# Patient Record
Sex: FEMALE | Race: WHITE | NOT HISPANIC OR LATINO
[De-identification: names, ages, dates, MRNs, and addresses within clinical notes are randomized per-mention and may not be internally consistent; named-entity substitution may affect disease eponyms.]

---

## 2017-01-05 ENCOUNTER — APPOINTMENT (OUTPATIENT)
Dept: CARDIOLOGY | Facility: CLINIC | Age: 72
End: 2017-01-05

## 2017-01-05 ENCOUNTER — NON-APPOINTMENT (OUTPATIENT)
Age: 72
End: 2017-01-05

## 2017-01-05 VITALS
BODY MASS INDEX: 30.12 KG/M2 | HEART RATE: 76 BPM | OXYGEN SATURATION: 97 % | DIASTOLIC BLOOD PRESSURE: 85 MMHG | HEIGHT: 63 IN | SYSTOLIC BLOOD PRESSURE: 153 MMHG | WEIGHT: 170 LBS

## 2017-01-05 VITALS — SYSTOLIC BLOOD PRESSURE: 142 MMHG | DIASTOLIC BLOOD PRESSURE: 80 MMHG

## 2017-01-11 LAB
ALBUMIN SERPL ELPH-MCNC: 4.5 G/DL
ALP BLD-CCNC: 75 U/L
ALT SERPL-CCNC: 27 U/L
AST SERPL-CCNC: 27 U/L
BILIRUB DIRECT SERPL-MCNC: 0.2 MG/DL
BILIRUB INDIRECT SERPL-MCNC: 0.8 MG/DL
BILIRUB SERPL-MCNC: 1 MG/DL
CHOLEST SERPL-MCNC: 187 MG/DL
CHOLEST/HDLC SERPL: 1.9 RATIO
HDLC SERPL-MCNC: 97 MG/DL
LDLC SERPL CALC-MCNC: 70 MG/DL
PROT SERPL-MCNC: 7.3 G/DL
TRIGL SERPL-MCNC: 99 MG/DL

## 2017-01-18 ENCOUNTER — MESSAGE (OUTPATIENT)
Age: 72
End: 2017-01-18

## 2017-01-20 ENCOUNTER — TRANSCRIPTION ENCOUNTER (OUTPATIENT)
Age: 72
End: 2017-01-20

## 2017-01-20 ENCOUNTER — APPOINTMENT (OUTPATIENT)
Dept: INTERNAL MEDICINE | Facility: CLINIC | Age: 72
End: 2017-01-20

## 2017-01-20 VITALS
SYSTOLIC BLOOD PRESSURE: 125 MMHG | BODY MASS INDEX: 30.12 KG/M2 | HEIGHT: 63 IN | TEMPERATURE: 98.5 F | WEIGHT: 170 LBS | DIASTOLIC BLOOD PRESSURE: 80 MMHG | HEART RATE: 70 BPM

## 2017-01-20 DIAGNOSIS — Z23 ENCOUNTER FOR IMMUNIZATION: ICD-10-CM

## 2017-02-09 ENCOUNTER — APPOINTMENT (OUTPATIENT)
Dept: VASCULAR SURGERY | Facility: CLINIC | Age: 72
End: 2017-02-09

## 2017-02-16 ENCOUNTER — APPOINTMENT (OUTPATIENT)
Dept: VASCULAR SURGERY | Facility: CLINIC | Age: 72
End: 2017-02-16

## 2017-02-16 VITALS
WEIGHT: 175 LBS | HEIGHT: 63 IN | RESPIRATION RATE: 16 BRPM | OXYGEN SATURATION: 98 % | BODY MASS INDEX: 31.01 KG/M2 | SYSTOLIC BLOOD PRESSURE: 126 MMHG | TEMPERATURE: 98.2 F | HEART RATE: 82 BPM | DIASTOLIC BLOOD PRESSURE: 76 MMHG

## 2017-03-29 ENCOUNTER — APPOINTMENT (OUTPATIENT)
Dept: INTERNAL MEDICINE | Facility: CLINIC | Age: 72
End: 2017-03-29

## 2017-03-29 VITALS
WEIGHT: 176 LBS | BODY MASS INDEX: 31.18 KG/M2 | SYSTOLIC BLOOD PRESSURE: 120 MMHG | HEART RATE: 79 BPM | HEIGHT: 63 IN | DIASTOLIC BLOOD PRESSURE: 80 MMHG | TEMPERATURE: 98.2 F

## 2017-03-29 DIAGNOSIS — J06.9 ACUTE UPPER RESPIRATORY INFECTION, UNSPECIFIED: ICD-10-CM

## 2017-03-29 RX ORDER — CEFDINIR 300 MG/1
300 CAPSULE ORAL TWICE DAILY
Qty: 20 | Refills: 0 | Status: DISCONTINUED | COMMUNITY
Start: 2017-01-20 | End: 2017-03-29

## 2017-06-30 ENCOUNTER — RX RENEWAL (OUTPATIENT)
Age: 72
End: 2017-06-30

## 2017-07-05 ENCOUNTER — RX RENEWAL (OUTPATIENT)
Age: 72
End: 2017-07-05

## 2017-07-13 ENCOUNTER — APPOINTMENT (OUTPATIENT)
Dept: CARDIOLOGY | Facility: CLINIC | Age: 72
End: 2017-07-13

## 2017-07-13 ENCOUNTER — NON-APPOINTMENT (OUTPATIENT)
Age: 72
End: 2017-07-13

## 2017-07-13 VITALS
WEIGHT: 173 LBS | OXYGEN SATURATION: 98 % | BODY MASS INDEX: 30.65 KG/M2 | HEART RATE: 74 BPM | DIASTOLIC BLOOD PRESSURE: 82 MMHG | SYSTOLIC BLOOD PRESSURE: 126 MMHG

## 2017-07-13 RX ORDER — AMOXICILLIN AND CLAVULANATE POTASSIUM 875; 125 MG/1; MG/1
875-125 TABLET, COATED ORAL
Qty: 20 | Refills: 0 | Status: DISCONTINUED | COMMUNITY
Start: 2017-03-29 | End: 2017-07-13

## 2017-07-14 ENCOUNTER — APPOINTMENT (OUTPATIENT)
Dept: INTERNAL MEDICINE | Facility: CLINIC | Age: 72
End: 2017-07-14

## 2017-07-14 VITALS
WEIGHT: 171 LBS | HEART RATE: 72 BPM | BODY MASS INDEX: 30.3 KG/M2 | HEIGHT: 63 IN | RESPIRATION RATE: 16 BRPM | DIASTOLIC BLOOD PRESSURE: 70 MMHG | SYSTOLIC BLOOD PRESSURE: 116 MMHG

## 2017-07-14 DIAGNOSIS — S93.412A SPRAIN OF CALCANEOFIBULAR LIGAMENT OF LEFT ANKLE, INITIAL ENCOUNTER: ICD-10-CM

## 2017-07-14 DIAGNOSIS — K59.09 OTHER CONSTIPATION: ICD-10-CM

## 2017-07-14 DIAGNOSIS — Z96.651 PRESENCE OF RIGHT ARTIFICIAL KNEE JOINT: ICD-10-CM

## 2017-07-15 LAB
ALBUMIN SERPL ELPH-MCNC: 4.4 G/DL
ALP BLD-CCNC: 67 U/L
ALT SERPL-CCNC: 21 U/L
ANION GAP SERPL CALC-SCNC: 17 MMOL/L
APPEARANCE: CLEAR
AST SERPL-CCNC: 23 U/L
BACTERIA: NEGATIVE
BASOPHILS # BLD AUTO: 0.04 K/UL
BASOPHILS NFR BLD AUTO: 0.8 %
BILIRUB SERPL-MCNC: 0.5 MG/DL
BILIRUBIN URINE: NEGATIVE
BLOOD URINE: ABNORMAL
BUN SERPL-MCNC: 15 MG/DL
CALCIUM SERPL-MCNC: 9 MG/DL
CHLORIDE SERPL-SCNC: 105 MMOL/L
CHOLEST SERPL-MCNC: 194 MG/DL
CHOLEST/HDLC SERPL: 2.3 RATIO
CO2 SERPL-SCNC: 20 MMOL/L
COLOR: YELLOW
CREAT SERPL-MCNC: 0.7 MG/DL
EOSINOPHIL # BLD AUTO: 0.21 K/UL
EOSINOPHIL NFR BLD AUTO: 3.9 %
GLUCOSE QUALITATIVE U: NORMAL MG/DL
GLUCOSE SERPL-MCNC: 103 MG/DL
HCT VFR BLD CALC: 40.7 %
HDLC SERPL-MCNC: 84 MG/DL
HGB BLD-MCNC: 13.1 G/DL
HYALINE CASTS: 5 /LPF
IMM GRANULOCYTES NFR BLD AUTO: 0.2 %
KETONES URINE: NEGATIVE
LDLC SERPL CALC-MCNC: 95 MG/DL
LEUKOCYTE ESTERASE URINE: ABNORMAL
LYMPHOCYTES # BLD AUTO: 1.56 K/UL
LYMPHOCYTES NFR BLD AUTO: 29.3 %
MAN DIFF?: NORMAL
MCHC RBC-ENTMCNC: 30.5 PG
MCHC RBC-ENTMCNC: 32.2 GM/DL
MCV RBC AUTO: 94.9 FL
MICROSCOPIC-UA: NORMAL
MONOCYTES # BLD AUTO: 0.8 K/UL
MONOCYTES NFR BLD AUTO: 15 %
NEUTROPHILS # BLD AUTO: 2.7 K/UL
NEUTROPHILS NFR BLD AUTO: 50.8 %
NITRITE URINE: NEGATIVE
PH URINE: 5
PLATELET # BLD AUTO: 350 K/UL
POTASSIUM SERPL-SCNC: 4.6 MMOL/L
PROT SERPL-MCNC: 7.1 G/DL
PROTEIN URINE: NEGATIVE MG/DL
RBC # BLD: 4.29 M/UL
RBC # FLD: 18.1 %
RED BLOOD CELLS URINE: 1 /HPF
SODIUM SERPL-SCNC: 142 MMOL/L
SPECIFIC GRAVITY URINE: 1.02
SQUAMOUS EPITHELIAL CELLS: 5 /HPF
TRIGL SERPL-MCNC: 73 MG/DL
UROBILINOGEN URINE: NORMAL MG/DL
WBC # FLD AUTO: 5.32 K/UL
WHITE BLOOD CELLS URINE: 11 /HPF

## 2017-09-21 ENCOUNTER — RX RENEWAL (OUTPATIENT)
Age: 72
End: 2017-09-21

## 2017-12-02 ENCOUNTER — INPATIENT (INPATIENT)
Facility: HOSPITAL | Age: 72
LOS: 1 days | Discharge: HOME | DRG: 432 | End: 2017-12-04
Attending: HOSPITALIST | Admitting: HOSPITALIST
Payer: COMMERCIAL

## 2017-12-02 VITALS
OXYGEN SATURATION: 99 % | WEIGHT: 175.05 LBS | HEART RATE: 83 BPM | RESPIRATION RATE: 18 BRPM | SYSTOLIC BLOOD PRESSURE: 144 MMHG | HEIGHT: 62 IN | TEMPERATURE: 98 F | DIASTOLIC BLOOD PRESSURE: 80 MMHG

## 2017-12-02 DIAGNOSIS — Z98.89 OTHER SPECIFIED POSTPROCEDURAL STATES: Chronic | ICD-10-CM

## 2017-12-02 LAB
ALBUMIN SERPL ELPH-MCNC: 3.8 G/DL — SIGNIFICANT CHANGE UP (ref 3.3–5.2)
ALP SERPL-CCNC: 241 U/L — HIGH (ref 40–120)
ALT FLD-CCNC: 770 U/L — HIGH
ANION GAP SERPL CALC-SCNC: 14 MMOL/L — SIGNIFICANT CHANGE UP (ref 5–17)
ANISOCYTOSIS BLD QL: SLIGHT — SIGNIFICANT CHANGE UP
APPEARANCE UR: CLEAR — SIGNIFICANT CHANGE UP
AST SERPL-CCNC: 773 U/L — HIGH
BASOPHILS # BLD AUTO: 0 K/UL — SIGNIFICANT CHANGE UP (ref 0–0.2)
BASOPHILS NFR BLD AUTO: 0.3 % — SIGNIFICANT CHANGE UP (ref 0–2)
BILIRUB SERPL-MCNC: 3.4 MG/DL — HIGH (ref 0.4–2)
BILIRUB UR-MCNC: NEGATIVE — SIGNIFICANT CHANGE UP
BUN SERPL-MCNC: 12 MG/DL — SIGNIFICANT CHANGE UP (ref 8–20)
CALCIUM SERPL-MCNC: 9.1 MG/DL — SIGNIFICANT CHANGE UP (ref 8.6–10.2)
CHLORIDE SERPL-SCNC: 102 MMOL/L — SIGNIFICANT CHANGE UP (ref 98–107)
CO2 SERPL-SCNC: 24 MMOL/L — SIGNIFICANT CHANGE UP (ref 22–29)
COLOR SPEC: YELLOW — SIGNIFICANT CHANGE UP
CREAT SERPL-MCNC: 0.45 MG/DL — LOW (ref 0.5–1.3)
DIFF PNL FLD: ABNORMAL
EOSINOPHIL # BLD AUTO: 0.2 K/UL — SIGNIFICANT CHANGE UP (ref 0–0.5)
EOSINOPHIL NFR BLD AUTO: 3.3 % — SIGNIFICANT CHANGE UP (ref 0–6)
EPI CELLS # UR: SIGNIFICANT CHANGE UP
GLUCOSE SERPL-MCNC: 105 MG/DL — SIGNIFICANT CHANGE UP (ref 70–115)
GLUCOSE UR QL: 100 MG/DL
HCT VFR BLD CALC: 39.8 % — SIGNIFICANT CHANGE UP (ref 37–47)
HGB BLD-MCNC: 13.4 G/DL — SIGNIFICANT CHANGE UP (ref 12–16)
INR BLD: 1.05 RATIO — SIGNIFICANT CHANGE UP (ref 0.88–1.16)
KETONES UR-MCNC: NEGATIVE — SIGNIFICANT CHANGE UP
LEUKOCYTE ESTERASE UR-ACNC: ABNORMAL
LIDOCAIN IGE QN: 50 U/L — SIGNIFICANT CHANGE UP (ref 22–51)
LYMPHOCYTES # BLD AUTO: 0.4 K/UL — LOW (ref 1–4.8)
LYMPHOCYTES # BLD AUTO: 5.7 % — LOW (ref 20–55)
MACROCYTES BLD QL: SLIGHT — SIGNIFICANT CHANGE UP
MCHC RBC-ENTMCNC: 33 PG — HIGH (ref 27–31)
MCHC RBC-ENTMCNC: 33.7 G/DL — SIGNIFICANT CHANGE UP (ref 32–36)
MCV RBC AUTO: 98 FL — SIGNIFICANT CHANGE UP (ref 81–99)
MONOCYTES # BLD AUTO: 0.7 K/UL — SIGNIFICANT CHANGE UP (ref 0–0.8)
MONOCYTES NFR BLD AUTO: 9.5 % — SIGNIFICANT CHANGE UP (ref 3–10)
NEUTROPHILS # BLD AUTO: 6 K/UL — SIGNIFICANT CHANGE UP (ref 1.8–8)
NEUTROPHILS NFR BLD AUTO: 80.9 % — HIGH (ref 37–73)
NITRITE UR-MCNC: NEGATIVE — SIGNIFICANT CHANGE UP
NT-PROBNP SERPL-SCNC: 266 PG/ML — SIGNIFICANT CHANGE UP (ref 0–300)
PH UR: 7 — SIGNIFICANT CHANGE UP (ref 5–8)
PLAT MORPH BLD: NORMAL — SIGNIFICANT CHANGE UP
PLATELET # BLD AUTO: 264 K/UL — SIGNIFICANT CHANGE UP (ref 150–400)
POTASSIUM SERPL-MCNC: 3.9 MMOL/L — SIGNIFICANT CHANGE UP (ref 3.5–5.3)
POTASSIUM SERPL-SCNC: 3.9 MMOL/L — SIGNIFICANT CHANGE UP (ref 3.5–5.3)
PROT SERPL-MCNC: 7 G/DL — SIGNIFICANT CHANGE UP (ref 6.6–8.7)
PROT UR-MCNC: 30 MG/DL
PROTHROM AB SERPL-ACNC: 11.6 SEC — SIGNIFICANT CHANGE UP (ref 9.8–12.7)
RBC # BLD: 4.06 M/UL — LOW (ref 4.4–5.2)
RBC # FLD: 15.3 % — SIGNIFICANT CHANGE UP (ref 11–15.6)
RBC BLD AUTO: ABNORMAL
SODIUM SERPL-SCNC: 140 MMOL/L — SIGNIFICANT CHANGE UP (ref 135–145)
SP GR SPEC: 1 — LOW (ref 1.01–1.02)
TROPONIN T SERPL-MCNC: <0.01 NG/ML — SIGNIFICANT CHANGE UP (ref 0–0.06)
UROBILINOGEN FLD QL: NEGATIVE MG/DL — SIGNIFICANT CHANGE UP
WBC # BLD: 7.4 K/UL — SIGNIFICANT CHANGE UP (ref 4.8–10.8)
WBC # FLD AUTO: 7.4 K/UL — SIGNIFICANT CHANGE UP (ref 4.8–10.8)
WBC UR QL: SIGNIFICANT CHANGE UP

## 2017-12-02 PROCEDURE — 74177 CT ABD & PELVIS W/CONTRAST: CPT | Mod: 26

## 2017-12-02 PROCEDURE — 76705 ECHO EXAM OF ABDOMEN: CPT | Mod: 26

## 2017-12-02 PROCEDURE — 99285 EMERGENCY DEPT VISIT HI MDM: CPT

## 2017-12-02 PROCEDURE — 71010: CPT | Mod: 26

## 2017-12-02 PROCEDURE — 93010 ELECTROCARDIOGRAM REPORT: CPT

## 2017-12-02 RX ORDER — MORPHINE SULFATE 50 MG/1
4 CAPSULE, EXTENDED RELEASE ORAL ONCE
Qty: 0 | Refills: 0 | Status: DISCONTINUED | OUTPATIENT
Start: 2017-12-02 | End: 2017-12-02

## 2017-12-02 RX ORDER — SODIUM CHLORIDE 9 MG/ML
3 INJECTION INTRAMUSCULAR; INTRAVENOUS; SUBCUTANEOUS ONCE
Qty: 0 | Refills: 0 | Status: COMPLETED | OUTPATIENT
Start: 2017-12-02 | End: 2017-12-02

## 2017-12-02 RX ADMIN — MORPHINE SULFATE 4 MILLIGRAM(S): 50 CAPSULE, EXTENDED RELEASE ORAL at 17:37

## 2017-12-02 RX ADMIN — MORPHINE SULFATE 4 MILLIGRAM(S): 50 CAPSULE, EXTENDED RELEASE ORAL at 17:50

## 2017-12-02 RX ADMIN — SODIUM CHLORIDE 3 MILLILITER(S): 9 INJECTION INTRAMUSCULAR; INTRAVENOUS; SUBCUTANEOUS at 15:19

## 2017-12-02 NOTE — ED PROVIDER NOTE - CARE PLAN
Principal Discharge DX:	Chest pain  Secondary Diagnosis:	Aches Principal Discharge DX:	Acute pancreatitis, unspecified complication status, unspecified pancreatitis type  Secondary Diagnosis:	Aches  Secondary Diagnosis:	Transaminitis

## 2017-12-02 NOTE — ED ADULT NURSE REASSESSMENT NOTE - NS ED NURSE REASSESS COMMENT FT1
report rec'd at this time patient presently at [facility] and waiting on her return

## 2017-12-02 NOTE — ED ADULT NURSE REASSESSMENT NOTE - NS ED NURSE REASSESS COMMENT FT1
patient returned from sono patient resting omforably  v/s taken and chart awating on results of sono

## 2017-12-02 NOTE — ED PROVIDER NOTE - PMH
Atrial fibrillation    ADARSH (obstructive sleep apnea)  does not use CPAP  Osteoarthritis  ,A- fib in Jan 2014 and converted back to normal sinus by self and had a cardiac ablation done in april 2014.

## 2017-12-02 NOTE — ED PROVIDER NOTE - OBJECTIVE STATEMENT
73 y/o female with a h/o afib s/p ablation and osteoporosis and she drinks a bottle of wine every night her daughter says and she was well until about 2 weeks ago she developed chest pain and then over the past 24 hours she has felt total body pain except her legs

## 2017-12-02 NOTE — ED PROVIDER NOTE - PSH
S/P ablation of atrial fibrillation    S/P right knee arthroscopy  (2014),right  foot surgery (1999), B/L cataract surgey (2008),

## 2017-12-03 DIAGNOSIS — F32.9 MAJOR DEPRESSIVE DISORDER, SINGLE EPISODE, UNSPECIFIED: ICD-10-CM

## 2017-12-03 DIAGNOSIS — K85.90 ACUTE PANCREATITIS WITHOUT NECROSIS OR INFECTION, UNSPECIFIED: ICD-10-CM

## 2017-12-03 DIAGNOSIS — R10.84 GENERALIZED ABDOMINAL PAIN: ICD-10-CM

## 2017-12-03 DIAGNOSIS — H26.40 UNSPECIFIED SECONDARY CATARACT: Chronic | ICD-10-CM

## 2017-12-03 DIAGNOSIS — Z29.9 ENCOUNTER FOR PROPHYLACTIC MEASURES, UNSPECIFIED: ICD-10-CM

## 2017-12-03 DIAGNOSIS — K75.9 INFLAMMATORY LIVER DISEASE, UNSPECIFIED: ICD-10-CM

## 2017-12-03 DIAGNOSIS — I48.91 UNSPECIFIED ATRIAL FIBRILLATION: ICD-10-CM

## 2017-12-03 DIAGNOSIS — R74.0 NONSPECIFIC ELEVATION OF LEVELS OF TRANSAMINASE AND LACTIC ACID DEHYDROGENASE [LDH]: ICD-10-CM

## 2017-12-03 LAB
AFP-TM SERPL-MCNC: 7.3 NG/ML — SIGNIFICANT CHANGE UP
ALBUMIN SERPL ELPH-MCNC: 3.6 G/DL — SIGNIFICANT CHANGE UP (ref 3.3–5.2)
ALP SERPL-CCNC: 249 U/L — HIGH (ref 40–120)
ALT FLD-CCNC: 706 U/L — HIGH
AMYLASE P1 CFR SERPL: 29 U/L — LOW (ref 36–128)
ANION GAP SERPL CALC-SCNC: 12 MMOL/L — SIGNIFICANT CHANGE UP (ref 5–17)
AST SERPL-CCNC: 474 U/L — HIGH
BILIRUB DIRECT SERPL-MCNC: 0.9 MG/DL — HIGH (ref 0–0.3)
BILIRUB INDIRECT FLD-MCNC: 1.3 MG/DL — HIGH (ref 0.2–1)
BILIRUB SERPL-MCNC: 2.2 MG/DL — HIGH (ref 0.4–2)
BILIRUB SERPL-MCNC: 2.2 MG/DL — HIGH (ref 0.4–2)
BUN SERPL-MCNC: 7 MG/DL — LOW (ref 8–20)
CALCIUM SERPL-MCNC: 8.9 MG/DL — SIGNIFICANT CHANGE UP (ref 8.6–10.2)
CHLORIDE SERPL-SCNC: 103 MMOL/L — SIGNIFICANT CHANGE UP (ref 98–107)
CHOLEST SERPL-MCNC: 153 MG/DL — SIGNIFICANT CHANGE UP (ref 110–199)
CO2 SERPL-SCNC: 25 MMOL/L — SIGNIFICANT CHANGE UP (ref 22–29)
CREAT SERPL-MCNC: 0.42 MG/DL — LOW (ref 0.5–1.3)
GGT SERPL-CCNC: 312 U/L — HIGH (ref 5–36)
GLUCOSE SERPL-MCNC: 121 MG/DL — HIGH (ref 70–115)
HAV IGG+IGM SER QL: SIGNIFICANT CHANGE UP
HAV IGM SER-ACNC: SIGNIFICANT CHANGE UP
HBV CORE IGM SER-ACNC: SIGNIFICANT CHANGE UP
HBV SURFACE AG SER-ACNC: SIGNIFICANT CHANGE UP
HCV AB S/CO SERPL IA: 0.06 S/CO — SIGNIFICANT CHANGE UP
HCV AB SERPL-IMP: SIGNIFICANT CHANGE UP
HDLC SERPL-MCNC: 105 MG/DL — SIGNIFICANT CHANGE UP
HETEROPH AB TITR SER AGGL: NEGATIVE — SIGNIFICANT CHANGE UP
LIDOCAIN IGE QN: 29 U/L — SIGNIFICANT CHANGE UP (ref 22–51)
LIPID PNL WITH DIRECT LDL SERPL: 39 MG/DL — SIGNIFICANT CHANGE UP
POTASSIUM SERPL-MCNC: 3.5 MMOL/L — SIGNIFICANT CHANGE UP (ref 3.5–5.3)
POTASSIUM SERPL-SCNC: 3.5 MMOL/L — SIGNIFICANT CHANGE UP (ref 3.5–5.3)
PROT SERPL-MCNC: 6.4 G/DL — LOW (ref 6.6–8.7)
SODIUM SERPL-SCNC: 140 MMOL/L — SIGNIFICANT CHANGE UP (ref 135–145)
TOTAL CHOLESTEROL/HDL RATIO MEASUREMENT: 1 RATIO — LOW (ref 3.3–7.1)
TRIGL SERPL-MCNC: 46 MG/DL — SIGNIFICANT CHANGE UP (ref 10–200)

## 2017-12-03 PROCEDURE — 12345: CPT | Mod: NC

## 2017-12-03 RX ORDER — SODIUM CHLORIDE 9 MG/ML
3 INJECTION INTRAMUSCULAR; INTRAVENOUS; SUBCUTANEOUS ONCE
Qty: 0 | Refills: 0 | Status: COMPLETED | OUTPATIENT
Start: 2017-12-03 | End: 2017-12-03

## 2017-12-03 RX ORDER — ZOLPIDEM TARTRATE 10 MG/1
1 TABLET ORAL
Qty: 0 | Refills: 0 | COMMUNITY

## 2017-12-03 RX ORDER — ASPIRIN/CALCIUM CARB/MAGNESIUM 324 MG
325 TABLET ORAL DAILY
Qty: 0 | Refills: 0 | Status: DISCONTINUED | OUTPATIENT
Start: 2017-12-03 | End: 2017-12-04

## 2017-12-03 RX ORDER — ONDANSETRON 8 MG/1
4 TABLET, FILM COATED ORAL EVERY 6 HOURS
Qty: 0 | Refills: 0 | Status: DISCONTINUED | OUTPATIENT
Start: 2017-12-03 | End: 2017-12-04

## 2017-12-03 RX ORDER — ENOXAPARIN SODIUM 100 MG/ML
40 INJECTION SUBCUTANEOUS EVERY 24 HOURS
Qty: 0 | Refills: 0 | Status: DISCONTINUED | OUTPATIENT
Start: 2017-12-03 | End: 2017-12-04

## 2017-12-03 RX ORDER — MULTIVIT-MIN/FERROUS GLUCONATE 9 MG/15 ML
1 LIQUID (ML) ORAL
Qty: 0 | Refills: 0 | COMMUNITY

## 2017-12-03 RX ORDER — SODIUM CHLORIDE 9 MG/ML
1000 INJECTION, SOLUTION INTRAVENOUS
Qty: 0 | Refills: 0 | Status: DISCONTINUED | OUTPATIENT
Start: 2017-12-03 | End: 2017-12-04

## 2017-12-03 RX ORDER — SERTRALINE 25 MG/1
50 TABLET, FILM COATED ORAL DAILY
Qty: 0 | Refills: 0 | Status: DISCONTINUED | OUTPATIENT
Start: 2017-12-03 | End: 2017-12-04

## 2017-12-03 RX ORDER — MORPHINE SULFATE 50 MG/1
2 CAPSULE, EXTENDED RELEASE ORAL EVERY 6 HOURS
Qty: 0 | Refills: 0 | Status: DISCONTINUED | OUTPATIENT
Start: 2017-12-03 | End: 2017-12-04

## 2017-12-03 RX ADMIN — Medication 325 MILLIGRAM(S): at 15:11

## 2017-12-03 RX ADMIN — SERTRALINE 50 MILLIGRAM(S): 25 TABLET, FILM COATED ORAL at 15:10

## 2017-12-03 RX ADMIN — SODIUM CHLORIDE 3 MILLILITER(S): 9 INJECTION INTRAMUSCULAR; INTRAVENOUS; SUBCUTANEOUS at 03:25

## 2017-12-03 RX ADMIN — SODIUM CHLORIDE 100 MILLILITER(S): 9 INJECTION, SOLUTION INTRAVENOUS at 03:23

## 2017-12-03 NOTE — H&P ADULT - NSHPLABSRESULTS_GEN_ALL_CORE
13.4   7.4   )-----------( 264      ( 02 Dec 2017 15:10 )             39.8   12-02    140  |  102  |  12.0  ----------------------------<  105  3.9   |  24.0  |  0.45<L>    Ca    9.1      02 Dec 2017 15:10    TPro  7.0  /  Alb  3.8  /  TBili  3.4<H>  /  DBili  x   /  AST  773<H>  /  ALT  770<H>  /  AlkPhos  241<H>  12-02    Lipase, Serum (12.02.17 @ 15:10)    Lipase, Serum: 50 U/L    US Abdomen Limited (12.02.17 @ 19:56) >  IMPRESSION:     No sonographic evidence of acute cholecystitis. No biliary ductal   dilatation.    CT Abdomen and Pelvis w/ Oral Cont and w/ IV Cont (12.02.17 @ 21:42) >  IMPRESSION:    Question mild stranding of the peripancreatic fat about the pancreatic   head which raises question of pancreatitis. Correlate with amylase/lipase.    Pancolonic diverticulosis without evidence of diverticulitis.

## 2017-12-03 NOTE — H&P ADULT - NSHPPHYSICALEXAM_GEN_ALL_CORE
ICU Vital Signs Last 24 Hrs  T(C): 37.4 (02 Dec 2017 20:00), Max: 37.4 (02 Dec 2017 20:00)  T(F): 99.4 (02 Dec 2017 20:00), Max: 99.4 (02 Dec 2017 20:00)  HR: 74 (02 Dec 2017 20:00) (74 - 83)  BP: 128/70 (02 Dec 2017 20:00) (128/70 - 146/82)  RR: 18 (02 Dec 2017 20:00) (17 - 18)  SpO2: 97% (02 Dec 2017 20:00) (97% - 99%)    GENERAL: NAD, well-groomed, well-developed  HEAD:  Atraumatic, Normocephalic  EYES: EOMI, PERRLA, conjunctiva and sclera clear, lens present bl due to cataract surgery   ENMT: No tonsillar erythema, exudates, or enlargement; Moist mucous membranes, Good dentition, No lesions  NECK: Supple, No JVD, Normal thyroid, no carotid bruit   LUNG: Clear to auscultation bilaterally; No rales, rhonchi, wheezing, or rubs  HEART: Regular rate and rhythm; No murmurs, rubs, or gallops  ABDOMEN: Soft, slight tender to deep palpation over the hypogastric region, no guarding, no rebound tenderness , Nondistended; Bowel sounds present  EXTREMITIES:  2+ Peripheral Pulses, No clubbing, cyanosis, or edema  LYMPH: No lymphadenopathy noted supraclavicular or cervical   NERVOUS SYSTEM:  Alert & Oriented X3, Good concentration; Motor Strength 5/5 B/L upper and lower extremities; CN II- XII grossly intact   SKIN: No rashes or lesions

## 2017-12-03 NOTE — CHART NOTE - NSCHARTNOTEFT_GEN_A_CORE
Post midnight admission brief note. Patient was seen and examined by overnight hospitalist this am. HPI reviewed. Labs, vitals noted. I have personally seen and examined this patient today     VS noted   Labs reviewed   Physical Exam:   Gen: Elderly female in nad  HEENT: eomi, perrla   CVS: S1S2nl, no m/r/g RRR   Lungs: clear   Abd: soft,NT,ND BS +  Ext: no c/c/e     Pt is a 73 yo F with PMH of paroxymal  Afib treated with ablation in 2014, ADARSH, osteoarthritis, HLD and anxiety. Pt came to the ED with CC of lower abdomen pain ,her pain started on Friday night , intensity was 10/10 , sharp pain , with no alleviating or exacerbating factors , no radiation , no previous hx of abdominal pain , pain was constant at that time but now she denies any pain. Pt denies fever, chills, back pain , diarrhea, recent travel, sick contacts, constipation, pain or burning sensation with urination, chest pain , sob, cough. Recent blood work in the ED shows Abnormal LFT. Pt states that she has been  taken Rosuvastatin 20mg daily for 2 yrs but over the past 2 weeks she did not have her medication and she took her  atorvastatin 80 mg twice, she also drink 2-3 glasses of wine daily for many yrs.    Patient admitted for pancreatitis with elevated transaminases. Seen by GI. Advance diet to full liquid. Pending MRCP   LIkely due to etoh/statin (took her husbands 80mg dose of lipitor for 2 days b/c her rx was not in), dehydration and fatty food intake   Will follow

## 2017-12-03 NOTE — CONSULT NOTE ADULT - SUBJECTIVE AND OBJECTIVE BOX
Patient is a 72y old  Female who presents with a chief complaint of Abdominal pain (03 Dec 2017 02:00)      HPI:  Pt is a 71 yo F with PMH of paroxymal  Afib treated with ablation in 2014, ADARSH, osteoarthritis, HLD and anxiety. Patient with lower abdominal  cramping, constant  pain, sharp starting 12/1. 10/10 pain . No pain now. Pain radiated to the chest, , back, arms, and back. No constipation, no diarrhea, no rectal bleeding, no fevers, + nausea, no sick contacts, No travel hx, no family hx of liver disease, no tatoos or blood transfusions.  Drinks 2-3 glases of wine a day.  Pt is normally on rosuvastatin 20 mg for years. She ran out 2 weeks ago . She took her husbands Lipitor 80 mg on 11/28 and 11/30.  had diarrhea after Ct contrast. She did not note jaundice at home    REVIEW OF SYSTEMS:  Constitutional: No fever, weight loss or fatigue  ENMT:  No difficulty hearing, tinnitus, vertigo; No sinus or throat pain  Respiratory: No cough, wheezing, chills or hemoptysis  Cardiovascular: No chest pain, palpitations, dizziness or leg swelling  Gastrointestinal: + abdominal pain. + nausea, no  vomiting or hematemesis;+ diarrhea ( after ct contrast ) or constipation. No melena or hematochezia.  Skin: No itching, burning, rashes or lesions    Musculoskeletal: No joint pain or swelling; No muscle, back or extremity pain    PAST MEDICAL & SURGICAL HISTORY:  Depression  Atrial fibrillation  ADARSH (obstructive sleep apnea): does not use CPAP  Osteoarthritis: ,A- fib in Jan 2014 and converted back to normal sinus by self and had a cardiac ablation done in april 2014.  After cataract, bilateral  S/P ablation of atrial fibrillation  S/P right knee arthroscopy: (2014),right  foot surgery (1999), B/L cataract surgey (2008),      FAMILY HISTORY:  Family history of heart attack (Father)      SOCIAL HISTORY:  Smoking Status: [ ] Current, [ ] Former, [ ] Never  Pack Years:  [  ] EtOH 2-3 glasses of wine a day  [  ] IVDA    MEDICATIONS:  MEDICATIONS  (STANDING):  aspirin 325 milliGRAM(s) Oral daily  dextrose 5% + sodium chloride 0.45%. 1000 milliLiter(s) (100 mL/Hr) IV Continuous <Continuous>  enoxaparin Injectable 40 milliGRAM(s) SubCutaneous every 24 hours  sertraline 50 milliGRAM(s) Oral daily    MEDICATIONS  (PRN):  morphine  - Injectable 2 milliGRAM(s) IV Push every 6 hours PRN Moderate Pain (4 - 6)  ondansetron Injectable 4 milliGRAM(s) IV Push every 6 hours PRN Nausea      Allergies    Percocet 10/325 (Vomiting)    Intolerances        Vital Signs Last 24 Hrs  T(C): 36.4 (03 Dec 2017 02:22), Max: 37.4 (02 Dec 2017 20:00)  T(F): 97.5 (03 Dec 2017 02:22), Max: 99.4 (02 Dec 2017 20:00)  HR: 74 (03 Dec 2017 02:22) (74 - 83)  BP: 163/84 (03 Dec 2017 02:22) (128/70 - 163/84)  BP(mean): --  RR: 18 (03 Dec 2017 02:22) (17 - 18)  SpO2: 99% (03 Dec 2017 02:22) (97% - 99%)        PHYSICAL EXAM:    General: Well developed; well nourished; in no acute distress  HEENT: MMM, conjunctiva and mild icterus  H- RRR  L- CTA  Gastrointestinal: Soft, non-tender non-distended; Normal bowel sounds; No rebound or guarding  Extremities: Normal range of motion, No clubbing, cyanosis or edema  Neurological: Alert and oriented x3  Skin: Warm and dry. No obvious rash- no juandice noted      LABS:                        13.4   7.4   )-----------( 264      ( 02 Dec 2017 15:10 )             39.8     03 Dec 2017 06:14    140    |  103    |  7.0    ----------------------------<  121    3.5     |  25.0   |  0.42     Ca    8.9        03 Dec 2017 06:14    TPro  6.4    /  Alb  3.6    /  TBili  2.2    /  DBili  0.9    /  AST  474    /  ALT  706    /  AlkPhos  249    / Amylase 29     /Lipase 29     03 Dec 2017 06:14              RADIOLOGY & ADDITIONAL STUDIES:     < from: CT Abdomen and Pelvis w/ Oral Cont and w/ IV Cont (12.02.17 @ 21:42) >    Question mild stranding of the peripancreatic fat about the pancreatic   head which raises question of pancreatitis. Correlate with amylase/lipase.    Pancolonic diverticulosis without evidence of diverticulitis.      < end of copied text >  < from: US Abdomen Limited (12.02.17 @ 19:56) >  MPRESSION:     No sonographic evidence of acute cholecystitis. No biliary ductal   dilatation.      < end of copied text >

## 2017-12-03 NOTE — H&P ADULT - PROBLEM SELECTOR PLAN 2
-Pt with hx of paroxymal Afib tx with ablation in 2014  -currently sinus rhythm on ECG  -continue tx with ASA dialy

## 2017-12-03 NOTE — H&P ADULT - PROBLEM SELECTOR PLAN 1
-Abnormal LFT most likely  2/2 to alcohol intake, statin tx or infection.    -Admit to medicine service under Dr. Lopez  -AST= 770   AEE=259  Total bili= 3.4  US: No sonographic evidence of acute cholecystitis. No biliary ductal dilatation.  CT Abdomen and Pelvis: mild stranding of the peripancreatic fat. No clinical findings of pancreatitis on exam, nl lipase level   -activity: ambulate as tolerate  -Vitals sings : per routine      -Diet: NPO for now   -hydration D5 + 1/2  cc/hr  -morphine PRN for pain  -repeat LFT in the AM  -Lipase , amylase  , hepatitis panel, GGT and acetaminophen level in the AM  -GI consul  -Avoid hepatotoxic medication -Abnormal LFT most likely  2/2 to alcohol intake, statin tx or infection.    -Admit to medicine service under Dr. Lopez  -AST= 770   EDH=754  Total bili= 3.4  US: No sonographic evidence of acute cholecystitis. No biliary ductal dilatation.  CT Abdomen and Pelvis: mild stranding of the peripancreatic fat. No clinical findings of pancreatitis on exam, nl lipase level   -activity: ambulate as tolerate  -Vitals sings : per routine      -Diet: NPO for now   -hydration D5 + 1/2  cc/hr  -morphine PRN for pain  -repeat LFT in the AM  -Lipase , amylase  , hepatitis panel, GGT and acetaminophen level in the AM  -GI consult  -Avoid hepatotoxic medication -Abnormal LFT most likely  2/2 to alcohol intake, statin tx or infection.    -Admit to medicine service under Dr. Lopez  -AST= 770   RWU=760  Total bili= 3.4  US: No sonographic evidence of acute cholecystitis. No biliary ductal dilatation.  CT Abdomen and Pelvis: mild stranding of the peripancreatic fat. No clinical findings of pancreatitis on exam, nl lipase level   -activity: ambulate as tolerate  -Vitals sings : per routine      -Diet: NPO for now   -hydration D5 + 1/2  cc/hr  -morphine PRN for pain  -Zofran PRN for n/v   -repeat LFT in the AM  -Lipase , amylase  ,lipid panel,  hepatitis panel, GGT and acetaminophen level in the AM  -GI consult  -Avoid hepatotoxic medication -Abnormal LFT most likely  2/2 to alcohol intake, statin tx or infection.    -Admit to medicine service under Dr. Lopez  -AST= 770   XXN=[id number]  Total bili= 3.4  US: No sonographic evidence of acute cholecystitis. No biliary ductal dilatation.  CT Abdomen and Pelvis: mild stranding of the peripancreatic fat. No clinical findings of pancreatitis on exam, nl lipase level   -activity: ambulate as tolerate  -Vitals sings : per routine      -Diet: NPO for now   -hydration D5 + 1/2  cc/hr  -morphine PRN for pain  -Zofran PRN for n/v   -repeat LFT in the AM  -Lipase , amylase  ,lipid panel,  hepatitis panel, bilirubin total and indirect,  GGT and acetaminophen level in the AM  -GI consult  -Avoid hepatotoxic medication -Abnormal LFT most likely  2/2 to alcohol intake, statin tx or infection.    -Admit to medicine service under Dr. Lopez  -AST= 770   GKC=413  Total bili= 3.4  US: No sonographic evidence of acute cholecystitis. No biliary ductal dilatation.  CT Abdomen and Pelvis: mild stranding of the peripancreatic fat. No clinical findings of pancreatitis on exam, nl lipase level   -activity: ambulate as tolerate  -Vitals sings : per routine      -Diet: NPO for now   -hydration D5 + 1/2  cc/hr  -morphine PRN for pain  -Zofran PRN for n/v   -repeat LFT in the AM  -Lipase , amylase  ,lipid panel, hepatitis panel, bilirubin total and indirect, GGT in the AM  -GI consult  -Avoid hepatotoxic medication -Abnormal LFT most likely  2/2 to alcohol intake, statin tx or infection.    -Admit to medicine service under Dr. Lopez  -AST= 770   QNV=708  Total bili= 3.4  US: No sonographic evidence of acute cholecystitis. No biliary ductal dilatation.  CT Abdomen and Pelvis: mild stranding of the peripancreatic fat. No clinical findings of pancreatitis on exam, nl lipase level   -activity: ambulate as tolerate  -Vitals sings : per routine      -Diet: NPO for now   -hydration D5 + 1/2  cc/hr  -morphine PRN for pain  -Zofran PRN for n/v   -repeat LFT in the AM  -Lipase , amylase  ,lipid panel, hepatitis A-B-C, bilirubin total and indirect, GGT in the AM  -GI consult  -Avoid hepatotoxic medication

## 2017-12-03 NOTE — H&P ADULT - PSH
After cataract, bilateral    S/P ablation of atrial fibrillation    S/P right knee arthroscopy  (2014),right  foot surgery (1999), B/L cataract surgey (2008),

## 2017-12-03 NOTE — H&P ADULT - PMH
Atrial fibrillation    Depression    ADARSH (obstructive sleep apnea)  does not use CPAP  Osteoarthritis  ,A- fib in Jan 2014 and converted back to normal sinus by self and had a cardiac ablation done in april 2014.

## 2017-12-03 NOTE — H&P ADULT - NSHPSOCIALHISTORY_GEN_ALL_CORE
pt states that she has been drinking 2-3 glasses of wine at night for many yrs, no smoking , no drugs

## 2017-12-03 NOTE — H&P ADULT - HISTORY OF PRESENT ILLNESS
Pt is a 71 yo F with PMH of paroxymal  Afib treated with ablation in 2014, ADARSH, osteoarthritis, HLD and anxiety. Pt came to the ED with CC of lower abdomen pain ,her pain started on Friday night , intensity was 10/10 , sharp pain , with no alleviating or exacerbating factors , no radiation , no previous hx of abdominal pain , pt was constant at that time but now she denies any pain. Pt denies fever, chills, back pain , diarrhea, recent travel, sick contacts, constipation, pain or burning sensation with urination, chest pain , sob, cough. Recent blood work in the ED shows Abnormal LFT. Pt states that she has been  taken Rosuvastatin 20mg daily for 2 yrs but over the past 2 weeks she did not have her medication and she took her  atorvastatin 80 mg twice, she also drink 2-3 glasses of wine daily for many yrs. Pt is a 71 yo F with PMH of paroxymal  Afib treated with ablation in 2014, ADARSH, osteoarthritis, HLD and anxiety. Pt came to the ED with CC of lower abdomen pain ,her pain started on Friday night , intensity was 10/10 , sharp pain , with no alleviating or exacerbating factors , no radiation , no previous hx of abdominal pain , pain was constant at that time but now she denies any pain. Pt denies fever, chills, back pain , diarrhea, recent travel, sick contacts, constipation, pain or burning sensation with urination, chest pain , sob, cough. Recent blood work in the ED shows Abnormal LFT. Pt states that she has been  taken Rosuvastatin 20mg daily for 2 yrs but over the past 2 weeks she did not have her medication and she took her  atorvastatin 80 mg twice, she also drink 2-3 glasses of wine daily for many yrs.

## 2017-12-03 NOTE — CONSULT NOTE ADULT - PROBLEM SELECTOR RECOMMENDATION 9
Hold cholesterol meds.   - will order liver serologies for chronic liver disease as well and CMV, EBV  - possible ETOH liver disease  -Will get MRCP - maybe passed small stone causing a mild pancreatitis. Pain now resolved.

## 2017-12-03 NOTE — H&P ADULT - ASSESSMENT
Pt is a 73 yo F with PMH of paroxymal  Afib treated with ablation in 2014, ADARSH, osteoarthritis, HLD and anxiety came to the ED w/ cc of lower abdomen pain , found to have abnormal LFT most likely  2/2 to alcohol intake, statin tx or infection.

## 2017-12-04 ENCOUNTER — TRANSCRIPTION ENCOUNTER (OUTPATIENT)
Age: 72
End: 2017-12-04

## 2017-12-04 VITALS
DIASTOLIC BLOOD PRESSURE: 79 MMHG | SYSTOLIC BLOOD PRESSURE: 144 MMHG | TEMPERATURE: 98 F | OXYGEN SATURATION: 98 % | RESPIRATION RATE: 18 BRPM | HEART RATE: 59 BPM

## 2017-12-04 LAB
ALBUMIN SERPL ELPH-MCNC: 3.4 G/DL — SIGNIFICANT CHANGE UP (ref 3.3–5.2)
ALP SERPL-CCNC: 231 U/L — HIGH (ref 40–120)
ALT FLD-CCNC: 457 U/L — HIGH
ANA TITR SER: NEGATIVE — SIGNIFICANT CHANGE UP
ANION GAP SERPL CALC-SCNC: 15 MMOL/L — SIGNIFICANT CHANGE UP (ref 5–17)
AST SERPL-CCNC: 229 U/L — HIGH
BILIRUB SERPL-MCNC: 0.8 MG/DL — SIGNIFICANT CHANGE UP (ref 0.4–2)
BUN SERPL-MCNC: 8 MG/DL — SIGNIFICANT CHANGE UP (ref 8–20)
CALCIUM SERPL-MCNC: 8.9 MG/DL — SIGNIFICANT CHANGE UP (ref 8.6–10.2)
CERULOPLASMIN SERPL-MCNC: 38 MG/DL — SIGNIFICANT CHANGE UP (ref 20–60)
CHLORIDE SERPL-SCNC: 104 MMOL/L — SIGNIFICANT CHANGE UP (ref 98–107)
CMV DNA CSF QL NAA+PROBE: SIGNIFICANT CHANGE UP
CO2 SERPL-SCNC: 23 MMOL/L — SIGNIFICANT CHANGE UP (ref 22–29)
CREAT SERPL-MCNC: 0.44 MG/DL — LOW (ref 0.5–1.3)
EBV EA AB SER IA-ACNC: <5 U/ML — SIGNIFICANT CHANGE UP
EBV EA AB TITR SER IF: POSITIVE
EBV EA IGG SER-ACNC: NEGATIVE — SIGNIFICANT CHANGE UP
EBV NA IGG SER IA-ACNC: 484 U/ML — HIGH
EBV PATRN SPEC IB-IMP: SIGNIFICANT CHANGE UP
EBV VCA IGG AVIDITY SER QL IA: POSITIVE
EBV VCA IGM SER IA-ACNC: 369 U/ML — HIGH
EBV VCA IGM SER IA-ACNC: <10 U/ML — SIGNIFICANT CHANGE UP
EBV VCA IGM TITR FLD: NEGATIVE — SIGNIFICANT CHANGE UP
FERRITIN SERPL-MCNC: 77.7 NG/ML — SIGNIFICANT CHANGE UP (ref 11–306)
GLUCOSE SERPL-MCNC: 104 MG/DL — SIGNIFICANT CHANGE UP (ref 70–115)
HAV IGM SER-ACNC: SIGNIFICANT CHANGE UP
HBV CORE AB SER-ACNC: SIGNIFICANT CHANGE UP
HBV CORE IGM SER-ACNC: SIGNIFICANT CHANGE UP
HBV E AB SER-ACNC: NEGATIVE — SIGNIFICANT CHANGE UP
HBV E AG SER-ACNC: NEGATIVE — SIGNIFICANT CHANGE UP
HBV SURFACE AB SER-ACNC: SIGNIFICANT CHANGE UP
HBV SURFACE AG SER-ACNC: SIGNIFICANT CHANGE UP
HCT VFR BLD CALC: 40.9 % — SIGNIFICANT CHANGE UP (ref 37–47)
HCV AB S/CO SERPL IA: 0.06 S/CO — SIGNIFICANT CHANGE UP
HCV AB SERPL-IMP: SIGNIFICANT CHANGE UP
HGB BLD-MCNC: 13.5 G/DL — SIGNIFICANT CHANGE UP (ref 12–16)
IRON SATN MFR SERPL: 32 UG/DL — LOW (ref 37–145)
IRON SATN MFR SERPL: 9 % — LOW (ref 14–50)
MAGNESIUM SERPL-MCNC: 2 MG/DL — SIGNIFICANT CHANGE UP (ref 1.6–2.6)
MCHC RBC-ENTMCNC: 32.5 PG — HIGH (ref 27–31)
MCHC RBC-ENTMCNC: 33 G/DL — SIGNIFICANT CHANGE UP (ref 32–36)
MCV RBC AUTO: 98.3 FL — SIGNIFICANT CHANGE UP (ref 81–99)
PHOSPHATE SERPL-MCNC: 3.5 MG/DL — SIGNIFICANT CHANGE UP (ref 2.4–4.7)
PLATELET # BLD AUTO: 258 K/UL — SIGNIFICANT CHANGE UP (ref 150–400)
POTASSIUM SERPL-MCNC: 3.9 MMOL/L — SIGNIFICANT CHANGE UP (ref 3.5–5.3)
POTASSIUM SERPL-SCNC: 3.9 MMOL/L — SIGNIFICANT CHANGE UP (ref 3.5–5.3)
PROT SERPL-MCNC: 6.6 G/DL — SIGNIFICANT CHANGE UP (ref 6.6–8.7)
RBC # BLD: 4.16 M/UL — LOW (ref 4.4–5.2)
RBC # FLD: 15.4 % — SIGNIFICANT CHANGE UP (ref 11–15.6)
SODIUM SERPL-SCNC: 142 MMOL/L — SIGNIFICANT CHANGE UP (ref 135–145)
TIBC SERPL-MCNC: 347 UG/DL — SIGNIFICANT CHANGE UP (ref 220–430)
TRANSFERRIN SERPL-MCNC: 243 MG/DL — SIGNIFICANT CHANGE UP (ref 192–382)
WBC # BLD: 4.6 K/UL — LOW (ref 4.8–10.8)
WBC # FLD AUTO: 4.6 K/UL — LOW (ref 4.8–10.8)

## 2017-12-04 PROCEDURE — 99239 HOSP IP/OBS DSCHRG MGMT >30: CPT

## 2017-12-04 PROCEDURE — 74183 MRI ABD W/O CNTR FLWD CNTR: CPT | Mod: 26

## 2017-12-04 RX ORDER — SERTRALINE 25 MG/1
1 TABLET, FILM COATED ORAL
Qty: 0 | Refills: 0 | COMMUNITY
Start: 2017-12-04

## 2017-12-04 RX ORDER — FERROUS SULFATE 325(65) MG
1 TABLET ORAL
Qty: 30 | Refills: 0 | OUTPATIENT
Start: 2017-12-04 | End: 2018-01-03

## 2017-12-04 RX ORDER — ASCORBIC ACID 60 MG
1 TABLET,CHEWABLE ORAL
Qty: 30 | Refills: 0 | OUTPATIENT
Start: 2017-12-04 | End: 2018-01-03

## 2017-12-04 RX ORDER — SERTRALINE 25 MG/1
1 TABLET, FILM COATED ORAL
Qty: 0 | Refills: 0 | COMMUNITY

## 2017-12-04 RX ORDER — ROSUVASTATIN CALCIUM 5 MG/1
1 TABLET ORAL
Qty: 0 | Refills: 0 | COMMUNITY

## 2017-12-04 RX ADMIN — Medication 325 MILLIGRAM(S): at 13:25

## 2017-12-04 RX ADMIN — Medication 1 TABLET(S): at 13:25

## 2017-12-04 RX ADMIN — Medication 1 TABLET(S): at 18:15

## 2017-12-04 NOTE — DISCHARGE NOTE ADULT - PATIENT PORTAL LINK FT
“You can access the Follow[facility] Patient Portal, offered by [facility], by registering with the following website: [email or web address]”

## 2017-12-04 NOTE — DISCHARGE NOTE ADULT - CARE PROVIDERS DIRECT ADDRESSES
[email or web address]

## 2017-12-04 NOTE — DISCHARGE NOTE ADULT - PLAN OF CARE
follow up with Gastroenterology -Your liver enzymes have come down   -Please refrain from drinking alcohol completely   -please see the labs below   -I have spoken to Dr Nunes who is a hepatologist and he would like you to call and make an appointment with him - his number is [phone] Your lipase was normal and you have tolerated your diet, please refrain from alcohol and fatty foods c/w management prior to admission to hospital c/w aspirin at this time and f/up with your cardiologist please go on the american heart association website - go red for women for tips on age appropriate diet and lifestyle changes   Discuss any new changes with your primary care MD

## 2017-12-04 NOTE — DISCHARGE NOTE ADULT - SECONDARY DIAGNOSIS.
Acute pancreatitis, unspecified complication status, unspecified pancreatitis type ADARSH (obstructive sleep apnea) Atrial fibrillation Preventive measure

## 2017-12-04 NOTE — DISCHARGE NOTE ADULT - OTHER SIGNIFICANT FINDINGS
13.5   4.6   )-----------( 258      ( 04 Dec 2017 08:12 )             40.9   12-04    142  |  104  |  8.0  ----------------------------<  104  3.9   |  23.0  |  0.44<L>    Ca    8.9      04 Dec 2017 08:12  Phos  3.5     12-04  Mg     2.0     12-04    TPro  6.6  /  Alb  3.4  /  TBili  0.8  /  DBili  x   /  AST  229<H>  /  ALT  457<H>  /  AlkPhos  231<H>  12-04    < from: MR MRCP w/wo IV Cont (12.04.17 @ 09:09) >  Findings:    Liver is enlarged with caudate lobe hypertrophy and atrophy of the medial   segment left hepatic lobe, morphologic changes consistent with cirrhosis.   Early surface contour nodularity. Several tiny cysts scattered throughout   the liver. No definite suspicious hepatic lesions or abnormal hepatic   enhancement. Subcentimeter kelly hepatis and portacaval lymph nodes   likely reactive in nature.    There is no ascites.    Aorta demonstrates minimal atherosclerotic changes without aneurysmal   dilatation or significant stenosis. Portal veins, superior mesenteric   vein and splenic vein are widely patent. Celiac axis, superior mesenteric   artery and inferior mesenteric artery are widely patent.    Gallbladder is unremarkable. No intrahepatic or extrahepatic blurred   dilatation. Common bile duct is of normal caliber.    Pancreas has normal signal intensity and normal enhancement. There is no   evidence of pancreatic mass. The pancreatic duct is of normal caliber.    Spleen and bilateral adrenal glands are within normal limits. Kidneys   enhance bilaterally and symmetrically without hydronephrosis, renal mass   or perinephric collections. No significant retroperitoneal abnormality.    Degenerative changes of the visualized osseous structures.     IMPRESSION:    Findings within the liver compatible with cirrhosis. No definite   suspicious hepatic masses.     No biliary dilatation. No common bile duct calculus.    < end of copied text >

## 2017-12-04 NOTE — DISCHARGE NOTE ADULT - CARE PROVIDER_API CALL
Uche Jefferson), Internal Medicine  [address]  [place] [address]  Phone: [phone]  Fax: [phone]    Marta Lang (), Gastroenterology  [address]  [address]  [place] [address]  Phone: [phone]  Fax: [phone]    Micah Nunes), Gastroenterology; Internal Medicine  [address] [place] [address]  Phone: [phone]  Fax: [phone]

## 2017-12-04 NOTE — DISCHARGE NOTE ADULT - MEDICATION SUMMARY - MEDICATIONS TO STOP TAKING
I will STOP taking the medications listed below when I get home from the hospital:    rosuvastatin 20 mg oral tablet  -- 1 tab(s) by mouth once a day (at bedtime)

## 2017-12-04 NOTE — DISCHARGE NOTE ADULT - CARE PLAN
Principal Discharge DX:	Hepatitis  Goal:	follow up with Gastroenterology  Instructions for follow-up, activity and diet:	-Your liver enzymes have come down   -Please refrain from drinking alcohol completely   -please see the labs below   -I have spoken to Dr Nunes who is a hepatologist and he would like you to call and make an appointment with him - his number is [phone]  Secondary Diagnosis:	Acute pancreatitis, unspecified complication status, unspecified pancreatitis type  Instructions for follow-up, activity and diet:	Your lipase was normal and you have tolerated your diet, please refrain from alcohol and fatty foods  Secondary Diagnosis:	ADARSH (obstructive sleep apnea)  Instructions for follow-up, activity and diet:	c/w management prior to admission to hospital  Secondary Diagnosis:	Atrial fibrillation  Instructions for follow-up, activity and diet:	c/w aspirin at this time and f/up with your cardiologist  Secondary Diagnosis:	Preventive measure  Instructions for follow-up, activity and diet:	please go on the american heart association website - go red for women for tips on age appropriate diet and lifestyle changes   Discuss any new changes with your primary care MD

## 2017-12-04 NOTE — DISCHARGE NOTE ADULT - MEDICATION SUMMARY - MEDICATIONS TO TAKE
I will START or STAY ON the medications listed below when I get home from the hospital:    aspirin 325 mg oral tablet  -- 1 tab(s) by mouth once a day  -- Indication: For Atrial fibrillation    sertraline 50 mg oral tablet  -- 1 tab(s) by mouth once a day  -- Indication: For Depression    Ambien 5 mg oral tablet  -- 1 tab(s) by mouth once a day (at bedtime) as needed  -- Indication: For insomnia    Centrum oral tablet  -- 1 tab(s) by mouth once a day  -- Indication: For nutritional

## 2017-12-04 NOTE — PROGRESS NOTE ADULT - PROBLEM SELECTOR PLAN 1
liver serologies ordered.   - MRCP pending   - check LFT today  - if diarrhea continues . then will order stool studies

## 2017-12-04 NOTE — CHART NOTE - NSCHARTNOTEFT_GEN_A_CORE
Patient seen and examined at bedside. No acute distress and no acute overnight events. MRCP results discussed. Clinically improved, labs improved     VS noted   Labs reviewed   Physical Exam:   Gen: Elderly female in nad  HEENT: eomi, perrla   CVS: S1S2nl, no m/r/g RRR   Lungs: clear   Abd: soft,NT,ND BS +  Ext: no c/c/e     Pt is a 73 yo F with PMH of paroxymal  Afib treated with ablation in 2014, ADARSH, osteoarthritis, HLD and anxiety. Pt came to the ED with CC of lower abdomen pain ,her pain started on Friday night , intensity was 10/10 , sharp pain , with no alleviating or exacerbating factors , no radiation , no previous hx of abdominal pain , pain was constant at that time but now she denies any pain. Pt denies fever, chills, back pain , diarrhea, recent travel, sick contacts, constipation, pain or burning sensation with urination, chest pain , sob, cough. Recent blood work in the ED shows Abnormal LFT. Pt states that she has been  taken Rosuvastatin 20mg daily for 2 yrs but over the past 2 weeks she did not have her medication and she took her  atorvastatin 80 mg twice, she also drink 2-3 glasses of wine daily for many yrs.    Patient to d/c home. Please see discharge papers for details.

## 2017-12-04 NOTE — DISCHARGE NOTE ADULT - ADDITIONAL INSTRUCTIONS
f/up with PCP Dr Jefferson in 2-3 days after d/c   f/up with Dr Rickey CORTEZ in 1-2 weeks   F/up with Dr Nava Hepatology as soon as you can

## 2017-12-04 NOTE — PROGRESS NOTE ADULT - SUBJECTIVE AND OBJECTIVE BOX
Patient is a 72y old  Female who presents with a chief complaint of Abdominal pain (03 Dec 2017 02:00)      HPI:  Pt is a 73 yo F with PMH of paroxymal  Afib treated with ablation in 2014, ADARSH, osteoarthritis, HLD and anxiety. Pt came to the ED with CC of lower abdomen pain . Found to have increased LFT. CT with mild edema around pancreas. This am no pain, + loose stool since Ct. NO fevers    REVIEW OF SYSTEMS:  Constitutional: No fever, weight loss or fatigue  ENMT:  No difficulty hearing, tinnitus, vertigo; No sinus or throat pain  Respiratory: No cough, wheezing, chills or hemoptysis  Cardiovascular: No chest pain, palpitations, dizziness or leg swelling  Gastrointestinal: No abdominal or epigastric pain. No nausea, vomiting or hematemesis;+ diarrhea. No melena or hematochezia.  Skin: No itching, burning, rashes or lesions   Musculoskeletal: No joint pain or swelling; No muscle, back or extremity pain    PAST MEDICAL & SURGICAL HISTORY:  Depression  Atrial fibrillation  ADARSH (obstructive sleep apnea): does not use CPAP  Osteoarthritis: ,A- fib in Jan 2014 and converted back to normal sinus by self and had a cardiac ablation done in april 2014.  After cataract, bilateral  S/P ablation of atrial fibrillation  S/P right knee arthroscopy: (2014),right  foot surgery (1999), B/L cataract surgey (2008),      FAMILY HISTORY:  Family history of heart attack (Father)      SOCIAL HISTORY:  Smoking Status: [ ] Current, [ ] Former, [ ] Never  Pack Years:  [  ] EtOH-2-3 glasses of wine a day  [  ] IVDA    MEDICATIONS:  MEDICATIONS  (STANDING):  aspirin 325 milliGRAM(s) Oral daily  dextrose 5% + sodium chloride 0.45%. 1000 milliLiter(s) (100 mL/Hr) IV Continuous <Continuous>  enoxaparin Injectable 40 milliGRAM(s) SubCutaneous every 24 hours  sertraline 50 milliGRAM(s) Oral daily    MEDICATIONS  (PRN):  morphine  - Injectable 2 milliGRAM(s) IV Push every 6 hours PRN Moderate Pain (4 - 6)  ondansetron Injectable 4 milliGRAM(s) IV Push every 6 hours PRN Nausea      Allergies    Percocet 10/325 (Vomiting)    Intolerances        Vital Signs Last 24 Hrs  T(C): 36.9 (04 Dec 2017 01:31), Max: 36.9 (04 Dec 2017 01:31)  T(F): 98.4 (04 Dec 2017 01:31), Max: 98.4 (04 Dec 2017 01:31)  HR: 62 (04 Dec 2017 01:31) (62 - 64)  BP: 122/68 (04 Dec 2017 01:31) (122/68 - 122/68)  BP(mean): --  RR: 18 (04 Dec 2017 01:31) (18 - 18)  SpO2: --    12-03 @ 07:01  -  12-04 @ 07:00  --------------------------------------------------------  IN: 1180 mL / OUT: 0 mL / NET: 1180 mL          PHYSICAL EXAM:    General: Well developed; well nourished; in no acute distress  HEENT: MMM, conjunctiva and sclera clear  H- RRR  l -CTA  Gastrointestinal: Soft, non-tender non-distended; Normal bowel sounds; No rebound or guarding  Extremities: Normal range of motion, No clubbing, cyanosis or edema  Neurological: Alert and oriented x3  Skin: Warm and dry. No obvious rash      LABS:                        13.4   7.4   )-----------( 264      ( 02 Dec 2017 15:10 )             39.8     03 Dec 2017 06:14    140    |  103    |  7.0    ----------------------------<  121    3.5     |  25.0   |  0.42     Ca    8.9        03 Dec 2017 06:14    TPro  6.4    /  Alb  3.6    /  TBili  2.2    /  DBili  0.9    /  AST  474    /  ALT  706    /  AlkPhos  249    / Amylase 29     /Lipase 29     03 Dec 2017 06:14        Alpha Fetoprotein - Tumor Marker: 7.3 ng/mL (12-03-17 @ 23:19)  Hepatitis A Total, IgM + IgG: Nonreact (12-03-17 @ 23:19)  Hepatitis B Core IgM Antibody: Nonreact (12-03-17 @ 15:26)  Hepatitis C Virus Interpretation: Nonreact (12-03-17 @ 15:26)  Hepatitis B Surface Antigen: Nonreact (12-03-17 @ 15:26)  Hepatitis C Virus S/CO Ratio: 0.06 S/CO (12-03-17 @ 15:26)        RADIOLOGY & ADDITIONAL STUDIES:     < from: CT Abdomen and Pelvis w/ Oral Cont and w/ IV Cont (12.02.17 @ 21:42) >  IMPRESSION:    Question mild stranding of the peripancreatic fat about the pancreatic   head which raises question of pancreatitis. Correlate with amylase/lipase.    Pancolonic diverticulosis without evidence of diverticulitis.        < end of copied text >

## 2017-12-04 NOTE — DISCHARGE NOTE ADULT - HOSPITAL COURSE
Pt is a 71 yo F with PMH of paroxymal  Afib treated with ablation in 2014, ADARSH, osteoarthritis, HLD and anxiety. Pt came to the ED with CC of lower abdomen pain ,her pain started on Friday night , intensity was 10/10 , sharp pain , with no alleviating or exacerbating factors , no radiation , no previous hx of abdominal pain , pain was constant at that time but now she denies any pain. Pt denies fever, chills, back pain , diarrhea, recent travel, sick contacts, constipation, pain or burning sensation with urination, chest pain , sob, cough. Recent blood work in the ED shows Abnormal LFT. Pt states that she has been  taken Rosuvastatin 20mg daily for 2 yrs but over the past 2 weeks she did not have her medication and she took her  atorvastatin 80 mg twice, she also drink 2-3 glasses of wine daily for many yrs.    Patient had bowel rest, advanced diet and then tolerated it within 24 hours. Abdominal pain was gone on day of admission. She had a few episodes of diarrhea which also stopped on its own. No fevers in house. Patient was seen by GI, transaminases trended down and she had an MRCP indicative of cirrhosis in medial lobe and hypertrophy in caudate lobe. EBV + for chronic infection. Likely etoh contributed to liver findings. We had an extensive discussion regarding findings and f/up. Dr Jefferson was given a summary to     Plan d/w patient, daughter and nephew who works here with us (Neil Adame)     Total time coordinating this discharge was 40 minutes.

## 2017-12-06 LAB
MITOCHONDRIA AB SER-ACNC: SIGNIFICANT CHANGE UP
SMOOTH MUSCLE AB SER-ACNC: SIGNIFICANT CHANGE UP

## 2017-12-15 ENCOUNTER — APPOINTMENT (OUTPATIENT)
Dept: NEUROLOGY | Facility: CLINIC | Age: 72
End: 2017-12-15
Payer: MEDICARE

## 2017-12-15 VITALS
BODY MASS INDEX: 30.12 KG/M2 | SYSTOLIC BLOOD PRESSURE: 140 MMHG | WEIGHT: 170 LBS | HEIGHT: 63 IN | DIASTOLIC BLOOD PRESSURE: 82 MMHG

## 2017-12-15 PROCEDURE — 99213 OFFICE O/P EST LOW 20 MIN: CPT

## 2017-12-18 ENCOUNTER — APPOINTMENT (OUTPATIENT)
Dept: GASTROENTEROLOGY | Facility: CLINIC | Age: 72
End: 2017-12-18
Payer: MEDICARE

## 2017-12-18 VITALS
SYSTOLIC BLOOD PRESSURE: 163 MMHG | RESPIRATION RATE: 16 BRPM | WEIGHT: 174 LBS | HEART RATE: 67 BPM | DIASTOLIC BLOOD PRESSURE: 91 MMHG | BODY MASS INDEX: 30.83 KG/M2 | HEIGHT: 63 IN

## 2017-12-18 DIAGNOSIS — K59.01 SLOW TRANSIT CONSTIPATION: ICD-10-CM

## 2017-12-18 PROCEDURE — 99214 OFFICE O/P EST MOD 30 MIN: CPT

## 2017-12-19 LAB
CRP SERPL-MCNC: 0.2 MG/DL
ERYTHROCYTE [SEDIMENTATION RATE] IN BLOOD BY WESTERGREN METHOD: 31 MM/HR

## 2017-12-19 PROCEDURE — 81001 URINALYSIS AUTO W/SCOPE: CPT

## 2017-12-19 PROCEDURE — 86664 EPSTEIN-BARR NUCLEAR ANTIGEN: CPT

## 2017-12-19 PROCEDURE — 86038 ANTINUCLEAR ANTIBODIES: CPT

## 2017-12-19 PROCEDURE — 36415 COLL VENOUS BLD VENIPUNCTURE: CPT

## 2017-12-19 PROCEDURE — 84484 ASSAY OF TROPONIN QUANT: CPT

## 2017-12-19 PROCEDURE — 86663 EPSTEIN-BARR ANTIBODY: CPT

## 2017-12-19 PROCEDURE — 83690 ASSAY OF LIPASE: CPT

## 2017-12-19 PROCEDURE — 80061 LIPID PANEL: CPT

## 2017-12-19 PROCEDURE — 86707 HEPATITIS BE ANTIBODY: CPT

## 2017-12-19 PROCEDURE — 80074 ACUTE HEPATITIS PANEL: CPT

## 2017-12-19 PROCEDURE — 86665 EPSTEIN-BARR CAPSID VCA: CPT

## 2017-12-19 PROCEDURE — 86709 HEPATITIS A IGM ANTIBODY: CPT

## 2017-12-19 PROCEDURE — 82150 ASSAY OF AMYLASE: CPT

## 2017-12-19 PROCEDURE — 85027 COMPLETE CBC AUTOMATED: CPT

## 2017-12-19 PROCEDURE — 86255 FLUORESCENT ANTIBODY SCREEN: CPT

## 2017-12-19 PROCEDURE — 83880 ASSAY OF NATRIURETIC PEPTIDE: CPT

## 2017-12-19 PROCEDURE — 82248 BILIRUBIN DIRECT: CPT

## 2017-12-19 PROCEDURE — 82105 ALPHA-FETOPROTEIN SERUM: CPT

## 2017-12-19 PROCEDURE — 84100 ASSAY OF PHOSPHORUS: CPT

## 2017-12-19 PROCEDURE — 82550 ASSAY OF CK (CPK): CPT

## 2017-12-19 PROCEDURE — 87340 HEPATITIS B SURFACE AG IA: CPT

## 2017-12-19 PROCEDURE — 96374 THER/PROPH/DIAG INJ IV PUSH: CPT | Mod: XU

## 2017-12-19 PROCEDURE — 87350 HEPATITIS BE AG IA: CPT

## 2017-12-19 PROCEDURE — 86705 HEP B CORE ANTIBODY IGM: CPT

## 2017-12-19 PROCEDURE — 85610 PROTHROMBIN TIME: CPT

## 2017-12-19 PROCEDURE — 86381 MITOCHONDRIAL ANTIBODY EACH: CPT

## 2017-12-19 PROCEDURE — 76705 ECHO EXAM OF ABDOMEN: CPT

## 2017-12-19 PROCEDURE — 82390 ASSAY OF CERULOPLASMIN: CPT

## 2017-12-19 PROCEDURE — 86803 HEPATITIS C AB TEST: CPT

## 2017-12-19 PROCEDURE — 82728 ASSAY OF FERRITIN: CPT

## 2017-12-19 PROCEDURE — 83550 IRON BINDING TEST: CPT

## 2017-12-19 PROCEDURE — 74177 CT ABD & PELVIS W/CONTRAST: CPT

## 2017-12-19 PROCEDURE — 86704 HEP B CORE ANTIBODY TOTAL: CPT

## 2017-12-19 PROCEDURE — 84466 ASSAY OF TRANSFERRIN: CPT

## 2017-12-19 PROCEDURE — 86706 HEP B SURFACE ANTIBODY: CPT

## 2017-12-19 PROCEDURE — 80053 COMPREHEN METABOLIC PANEL: CPT

## 2017-12-19 PROCEDURE — 99285 EMERGENCY DEPT VISIT HI MDM: CPT | Mod: 25

## 2017-12-19 PROCEDURE — 86308 HETEROPHILE ANTIBODY SCREEN: CPT

## 2017-12-19 PROCEDURE — 71045 X-RAY EXAM CHEST 1 VIEW: CPT

## 2017-12-19 PROCEDURE — 86708 HEPATITIS A ANTIBODY: CPT

## 2017-12-19 PROCEDURE — 93005 ELECTROCARDIOGRAM TRACING: CPT

## 2017-12-19 PROCEDURE — 74183 MRI ABD W/O CNTR FLWD CNTR: CPT

## 2017-12-19 PROCEDURE — 83735 ASSAY OF MAGNESIUM: CPT

## 2017-12-19 PROCEDURE — 82977 ASSAY OF GGT: CPT

## 2017-12-20 LAB
ALBUMIN SERPL ELPH-MCNC: 3.9 G/DL
ALP BLD-CCNC: 92 U/L
ALT SERPL-CCNC: 23 U/L
AMYLASE/CREAT SERPL: 51 U/L
ANION GAP SERPL CALC-SCNC: 11 MMOL/L
AST SERPL-CCNC: 25 U/L
BILIRUB SERPL-MCNC: 0.5 MG/DL
BUN SERPL-MCNC: 16 MG/DL
CALCIUM SERPL-MCNC: 9.7 MG/DL
CHLORIDE SERPL-SCNC: 107 MMOL/L
CO2 SERPL-SCNC: 27 MMOL/L
CREAT SERPL-MCNC: 0.68 MG/DL
GLUCOSE SERPL-MCNC: 94 MG/DL
LPL SERPL-CCNC: 54 U/L
POTASSIUM SERPL-SCNC: 4.6 MMOL/L
PROT SERPL-MCNC: 7 G/DL
SODIUM SERPL-SCNC: 145 MMOL/L

## 2017-12-22 ENCOUNTER — APPOINTMENT (OUTPATIENT)
Dept: INTERNAL MEDICINE | Facility: CLINIC | Age: 72
End: 2017-12-22
Payer: MEDICARE

## 2017-12-22 VITALS
OXYGEN SATURATION: 98 % | SYSTOLIC BLOOD PRESSURE: 130 MMHG | HEIGHT: 63 IN | WEIGHT: 168 LBS | HEART RATE: 68 BPM | BODY MASS INDEX: 29.77 KG/M2 | DIASTOLIC BLOOD PRESSURE: 80 MMHG

## 2017-12-22 DIAGNOSIS — Z87.09 PERSONAL HISTORY OF OTHER DISEASES OF THE RESPIRATORY SYSTEM: ICD-10-CM

## 2017-12-22 DIAGNOSIS — K76.89 OTHER SPECIFIED DISEASES OF LIVER: ICD-10-CM

## 2017-12-22 PROCEDURE — 90686 IIV4 VACC NO PRSV 0.5 ML IM: CPT

## 2017-12-22 PROCEDURE — 99214 OFFICE O/P EST MOD 30 MIN: CPT | Mod: 25

## 2017-12-22 PROCEDURE — G0008: CPT

## 2017-12-27 ENCOUNTER — RESULT REVIEW (OUTPATIENT)
Age: 72
End: 2017-12-27

## 2017-12-27 ENCOUNTER — APPOINTMENT (OUTPATIENT)
Age: 72
End: 2017-12-27
Payer: MEDICARE

## 2017-12-27 VITALS
TEMPERATURE: 97.9 F | DIASTOLIC BLOOD PRESSURE: 80 MMHG | HEART RATE: 72 BPM | BODY MASS INDEX: 30.48 KG/M2 | WEIGHT: 172 LBS | HEIGHT: 63 IN | RESPIRATION RATE: 14 BRPM | SYSTOLIC BLOOD PRESSURE: 130 MMHG

## 2017-12-27 LAB
APPEARANCE: ABNORMAL
BACTERIA: NEGATIVE
BASOPHILS # BLD AUTO: 0.04 K/UL
BASOPHILS NFR BLD AUTO: 0.8 %
BILIRUBIN URINE: NEGATIVE
BLOOD URINE: NEGATIVE
COLOR: YELLOW
EOSINOPHIL # BLD AUTO: 0.17 K/UL
EOSINOPHIL NFR BLD AUTO: 3.4
GLUCOSE QUALITATIVE U: NEGATIVE MG/DL
HCT VFR BLD CALC: 44.5 %
HGB BLD-MCNC: 14.2 G/DL
HYALINE CASTS: 3 /LPF
IMM GRANULOCYTES NFR BLD AUTO: 0 %
INR PPP: 0.89 RATIO
KETONES URINE: NEGATIVE
LEUKOCYTE ESTERASE URINE: ABNORMAL
LYMPHOCYTES # BLD AUTO: 1.47 K/UL
LYMPHOCYTES NFR BLD AUTO: 29.1 %
MAN DIFF?: NORMAL
MCHC RBC-ENTMCNC: 31.9 GM/DL
MCHC RBC-ENTMCNC: 32.1 PG
MCV RBC AUTO: 100.5 FL
MICROSCOPIC-UA: NORMAL
MONOCYTES # BLD AUTO: 0.64 K/UL
MONOCYTES NFR BLD AUTO: 12.7 %
NEUTROPHILS # BLD AUTO: 2.73 K/UL
NEUTROPHILS NFR BLD AUTO: 54 %
NITRITE URINE: NEGATIVE
PH URINE: 6
PLATELET # BLD AUTO: 365 K/UL
PROTEIN URINE: NEGATIVE MG/DL
PT BLD: 10 SEC
RBC # BLD: 4.43 M/UL
RBC # FLD: 15.4 %
RED BLOOD CELLS URINE: 0 /HPF
SPECIFIC GRAVITY URINE: 1.02
SQUAMOUS EPITHELIAL CELLS: 2 /HPF
URINE COMMENTS: NORMAL
UROBILINOGEN URINE: NEGATIVE MG/DL
WBC # FLD AUTO: 5.05 K/UL
WHITE BLOOD CELLS URINE: 2 /HPF

## 2017-12-27 PROCEDURE — 99214 OFFICE O/P EST MOD 30 MIN: CPT

## 2017-12-27 PROCEDURE — 99204 OFFICE O/P NEW MOD 45 MIN: CPT

## 2017-12-28 ENCOUNTER — APPOINTMENT (OUTPATIENT)
Dept: CARDIOLOGY | Facility: CLINIC | Age: 72
End: 2017-12-28
Payer: MEDICARE

## 2017-12-28 ENCOUNTER — NON-APPOINTMENT (OUTPATIENT)
Age: 72
End: 2017-12-28

## 2017-12-28 VITALS
SYSTOLIC BLOOD PRESSURE: 130 MMHG | HEIGHT: 63 IN | BODY MASS INDEX: 29.95 KG/M2 | HEART RATE: 68 BPM | OXYGEN SATURATION: 96 % | DIASTOLIC BLOOD PRESSURE: 72 MMHG | WEIGHT: 169 LBS

## 2017-12-28 LAB
AFP-TM SERPL-MCNC: 7.9 NG/ML
ALBUMIN SERPL ELPH-MCNC: 4.2 G/DL
ALP BLD-CCNC: 78 U/L
ALT SERPL-CCNC: 25 U/L
ANA SER IF-ACNC: NEGATIVE
ANION GAP SERPL CALC-SCNC: 14 MMOL/L
AST SERPL-CCNC: 27 U/L
BILIRUB SERPL-MCNC: 0.4 MG/DL
BUN SERPL-MCNC: 22 MG/DL
CALCIUM SERPL-MCNC: 10.2 MG/DL
CERULOPLASMIN SERPL-MCNC: 36 MG/DL
CHLORIDE SERPL-SCNC: 102 MMOL/L
CO2 SERPL-SCNC: 23 MMOL/L
CREAT SERPL-MCNC: 0.77 MG/DL
DEPRECATED KAPPA LC FREE/LAMBDA SER: 1.06 RATIO
FERRITIN SERPL-MCNC: 29 NG/ML
GLUCOSE SERPL-MCNC: 86 MG/DL
HAV IGG+IGM SER QL: NONREACTIVE
HBV CORE IGG+IGM SER QL: NONREACTIVE
HBV SURFACE AB SER QL: NONREACTIVE
HBV SURFACE AG SER QL: NONREACTIVE
HCV AB SER QL: NONREACTIVE
HCV S/CO RATIO: 0.06 S/CO
IGA SER QL IEP: 253 MG/DL
IGG SER QL IEP: 1060 MG/DL
IGM SER QL IEP: 136 MG/DL
IRON SATN MFR SERPL: 15 %
IRON SERPL-MCNC: 52 UG/DL
KAPPA LC CSF-MCNC: 1.58 MG/DL
KAPPA LC SERPL-MCNC: 1.67 MG/DL
POTASSIUM SERPL-SCNC: 4.6 MMOL/L
PROT SERPL-MCNC: 7.6 G/DL
SODIUM SERPL-SCNC: 139 MMOL/L
TIBC SERPL-MCNC: 350 UG/DL
UIBC SERPL-MCNC: 298 UG/DL

## 2017-12-28 PROCEDURE — 93000 ELECTROCARDIOGRAM COMPLETE: CPT

## 2017-12-28 PROCEDURE — 99214 OFFICE O/P EST MOD 30 MIN: CPT

## 2017-12-29 ENCOUNTER — APPOINTMENT (OUTPATIENT)
Dept: CARDIOLOGY | Facility: CLINIC | Age: 72
End: 2017-12-29
Payer: MEDICARE

## 2017-12-29 LAB
CARDIOLIPIN AB SER IA-ACNC: NEGATIVE
CHOLEST SERPL-MCNC: 352 MG/DL
CHOLEST/HDLC SERPL: 4.4 RATIO
HDLC SERPL-MCNC: 80 MG/DL
LDLC SERPL CALC-MCNC: 251 MG/DL
LKM AB SER QL IF: 0.7 UNITS
MITOCHONDRIA AB SER IF-ACNC: NORMAL
SMOOTH MUSCLE AB SER QL IF: NORMAL
TRIGL SERPL-MCNC: 105 MG/DL
TTG IGA SER IA-ACNC: <5 UNITS
TTG IGA SER-ACNC: NEGATIVE

## 2017-12-29 PROCEDURE — 93015 CV STRESS TEST SUPVJ I&R: CPT

## 2018-01-04 ENCOUNTER — FORM ENCOUNTER (OUTPATIENT)
Age: 73
End: 2018-01-04

## 2018-01-05 ENCOUNTER — APPOINTMENT (OUTPATIENT)
Dept: CARDIOLOGY | Facility: CLINIC | Age: 73
End: 2018-01-05
Payer: MEDICARE

## 2018-01-05 ENCOUNTER — APPOINTMENT (OUTPATIENT)
Dept: CT IMAGING | Facility: CLINIC | Age: 73
End: 2018-01-05
Payer: MEDICARE

## 2018-01-05 ENCOUNTER — OUTPATIENT (OUTPATIENT)
Dept: OUTPATIENT SERVICES | Facility: HOSPITAL | Age: 73
LOS: 1 days | End: 2018-01-05

## 2018-01-05 VITALS
HEART RATE: 70 BPM | DIASTOLIC BLOOD PRESSURE: 80 MMHG | OXYGEN SATURATION: 97 % | SYSTOLIC BLOOD PRESSURE: 140 MMHG | BODY MASS INDEX: 29.76 KG/M2 | WEIGHT: 168 LBS

## 2018-01-05 DIAGNOSIS — M94.0 CHONDROCOSTAL JUNCTION SYNDROME [TIETZE]: ICD-10-CM

## 2018-01-05 DIAGNOSIS — H26.40 UNSPECIFIED SECONDARY CATARACT: Chronic | ICD-10-CM

## 2018-01-05 DIAGNOSIS — Z98.89 OTHER SPECIFIED POSTPROCEDURAL STATES: Chronic | ICD-10-CM

## 2018-01-05 DIAGNOSIS — Z00.8 ENCOUNTER FOR OTHER GENERAL EXAMINATION: ICD-10-CM

## 2018-01-05 LAB — SOLUBLE LIVER IGG SER IA-ACNC: NORMAL

## 2018-01-05 PROCEDURE — 93000 ELECTROCARDIOGRAM COMPLETE: CPT

## 2018-01-05 PROCEDURE — 99214 OFFICE O/P EST MOD 30 MIN: CPT

## 2018-01-05 PROCEDURE — 71275 CT ANGIOGRAPHY CHEST: CPT | Mod: 26

## 2018-01-05 RX ORDER — DIAZEPAM 10 MG/1
10 TABLET ORAL
Qty: 120 | Refills: 0 | Status: DISCONTINUED | COMMUNITY
Start: 2017-10-09

## 2018-01-05 RX ORDER — AMOXICILLIN 500 MG/1
500 CAPSULE ORAL
Qty: 21 | Refills: 0 | Status: DISCONTINUED | COMMUNITY
Start: 2017-09-12

## 2018-01-07 ENCOUNTER — NON-APPOINTMENT (OUTPATIENT)
Age: 73
End: 2018-01-07

## 2018-01-10 ENCOUNTER — APPOINTMENT (OUTPATIENT)
Dept: CARDIOLOGY | Facility: CLINIC | Age: 73
End: 2018-01-10
Payer: MEDICARE

## 2018-01-10 PROCEDURE — 93306 TTE W/DOPPLER COMPLETE: CPT

## 2018-01-23 ENCOUNTER — APPOINTMENT (OUTPATIENT)
Dept: CARDIOLOGY | Facility: CLINIC | Age: 73
End: 2018-01-23
Payer: MEDICARE

## 2018-01-23 ENCOUNTER — APPOINTMENT (OUTPATIENT)
Age: 73
End: 2018-01-23

## 2018-01-23 VITALS — HEART RATE: 74 BPM | DIASTOLIC BLOOD PRESSURE: 77 MMHG | SYSTOLIC BLOOD PRESSURE: 116 MMHG

## 2018-01-23 PROCEDURE — 99214 OFFICE O/P EST MOD 30 MIN: CPT

## 2018-01-26 ENCOUNTER — APPOINTMENT (OUTPATIENT)
Dept: GASTROENTEROLOGY | Facility: GI CENTER | Age: 73
End: 2018-01-26
Payer: MEDICARE

## 2018-01-26 ENCOUNTER — OUTPATIENT (OUTPATIENT)
Dept: OUTPATIENT SERVICES | Facility: HOSPITAL | Age: 73
LOS: 1 days | End: 2018-01-26
Payer: MEDICARE

## 2018-01-26 ENCOUNTER — RESULT REVIEW (OUTPATIENT)
Age: 73
End: 2018-01-26

## 2018-01-26 DIAGNOSIS — H26.40 UNSPECIFIED SECONDARY CATARACT: Chronic | ICD-10-CM

## 2018-01-26 DIAGNOSIS — K21.9 GASTRO-ESOPHAGEAL REFLUX DISEASE WITHOUT ESOPHAGITIS: ICD-10-CM

## 2018-01-26 DIAGNOSIS — Z98.89 OTHER SPECIFIED POSTPROCEDURAL STATES: Chronic | ICD-10-CM

## 2018-01-26 PROCEDURE — 43239 EGD BIOPSY SINGLE/MULTIPLE: CPT

## 2018-01-26 PROCEDURE — 88305 TISSUE EXAM BY PATHOLOGIST: CPT | Mod: 26

## 2018-01-26 PROCEDURE — 88305 TISSUE EXAM BY PATHOLOGIST: CPT

## 2018-01-26 PROCEDURE — 88342 IMHCHEM/IMCYTCHM 1ST ANTB: CPT | Mod: 26

## 2018-01-26 PROCEDURE — 88342 IMHCHEM/IMCYTCHM 1ST ANTB: CPT

## 2018-01-31 LAB — SURGICAL PATHOLOGY FINAL REPORT - CH: SIGNIFICANT CHANGE UP

## 2018-02-09 ENCOUNTER — OUTPATIENT (OUTPATIENT)
Dept: OUTPATIENT SERVICES | Facility: HOSPITAL | Age: 73
LOS: 1 days | End: 2018-02-09
Payer: MEDICARE

## 2018-02-09 ENCOUNTER — APPOINTMENT (OUTPATIENT)
Dept: GASTROENTEROLOGY | Facility: GI CENTER | Age: 73
End: 2018-02-09
Payer: MEDICARE

## 2018-02-09 DIAGNOSIS — Z98.89 OTHER SPECIFIED POSTPROCEDURAL STATES: Chronic | ICD-10-CM

## 2018-02-09 DIAGNOSIS — H26.40 UNSPECIFIED SECONDARY CATARACT: Chronic | ICD-10-CM

## 2018-02-09 DIAGNOSIS — K51.90 ULCERATIVE COLITIS, UNSPECIFIED, WITHOUT COMPLICATIONS: ICD-10-CM

## 2018-02-09 PROCEDURE — 45378 DIAGNOSTIC COLONOSCOPY: CPT

## 2018-03-05 ENCOUNTER — OTHER (OUTPATIENT)
Age: 73
End: 2018-03-05

## 2018-03-29 ENCOUNTER — APPOINTMENT (OUTPATIENT)
Age: 73
End: 2018-03-29
Payer: MEDICARE

## 2018-03-29 VITALS
HEART RATE: 76 BPM | BODY MASS INDEX: 30.83 KG/M2 | DIASTOLIC BLOOD PRESSURE: 83 MMHG | RESPIRATION RATE: 17 BRPM | TEMPERATURE: 98.7 F | SYSTOLIC BLOOD PRESSURE: 141 MMHG | WEIGHT: 174 LBS | HEIGHT: 63 IN

## 2018-03-29 PROCEDURE — 99214 OFFICE O/P EST MOD 30 MIN: CPT | Mod: 25

## 2018-03-29 PROCEDURE — 91200 LIVER ELASTOGRAPHY: CPT

## 2018-03-29 PROCEDURE — ZZZZZ: CPT

## 2018-04-20 ENCOUNTER — APPOINTMENT (OUTPATIENT)
Dept: INTERNAL MEDICINE | Facility: CLINIC | Age: 73
End: 2018-04-20
Payer: MEDICARE

## 2018-04-20 ENCOUNTER — LABORATORY RESULT (OUTPATIENT)
Age: 73
End: 2018-04-20

## 2018-04-20 VITALS
TEMPERATURE: 98 F | WEIGHT: 172 LBS | BODY MASS INDEX: 30.48 KG/M2 | DIASTOLIC BLOOD PRESSURE: 85 MMHG | HEIGHT: 63 IN | SYSTOLIC BLOOD PRESSURE: 135 MMHG | OXYGEN SATURATION: 97 % | HEART RATE: 76 BPM

## 2018-04-20 PROCEDURE — 99214 OFFICE O/P EST MOD 30 MIN: CPT | Mod: 25

## 2018-04-20 PROCEDURE — 36415 COLL VENOUS BLD VENIPUNCTURE: CPT

## 2018-04-20 RX ORDER — ALIROCUMAB 75 MG/ML
75 INJECTION, SOLUTION SUBCUTANEOUS
Qty: 2 | Refills: 5 | Status: DISCONTINUED | COMMUNITY
Start: 2018-03-05 | End: 2018-04-20

## 2018-04-20 RX ORDER — POLYETHYLENE GLYCOL 3350, SODIUM SULFATE, SODIUM CHLORIDE, POTASSIUM CHLORIDE, ASCORBIC ACID, SODIUM ASCORBATE 7.5-2.691G
100 KIT ORAL
Qty: 1 | Refills: 0 | Status: DISCONTINUED | COMMUNITY
Start: 2018-01-26 | End: 2018-04-20

## 2018-04-20 RX ORDER — METHYLPREDNISOLONE 4 MG/1
4 TABLET ORAL
Qty: 1 | Refills: 0 | Status: DISCONTINUED | COMMUNITY
Start: 2018-01-05 | End: 2018-04-20

## 2018-04-20 RX ORDER — POLYETHYLENE GLYCOL 3350 17 G/17G
17 POWDER, FOR SOLUTION ORAL DAILY
Qty: 1530 | Refills: 1 | Status: DISCONTINUED | COMMUNITY
Start: 2017-12-18 | End: 2018-04-20

## 2018-04-20 RX ORDER — LINACLOTIDE 290 UG/1
290 CAPSULE, GELATIN COATED ORAL
Qty: 90 | Refills: 1 | Status: DISCONTINUED | COMMUNITY
Start: 2018-01-26 | End: 2018-04-20

## 2018-04-20 RX ORDER — OMEPRAZOLE 20 MG/1
20 CAPSULE, DELAYED RELEASE ORAL DAILY
Qty: 90 | Refills: 0 | Status: DISCONTINUED | COMMUNITY
Start: 2017-12-18 | End: 2018-04-20

## 2018-04-23 LAB
ALBUMIN SERPL ELPH-MCNC: 4.1 G/DL
ALP BLD-CCNC: 76 U/L
ALT SERPL-CCNC: 20 U/L
ANION GAP SERPL CALC-SCNC: 14 MMOL/L
AST SERPL-CCNC: 24 U/L
BASOPHILS # BLD AUTO: 0 K/UL
BASOPHILS NFR BLD AUTO: 0 %
BILIRUB SERPL-MCNC: 0.7 MG/DL
BUN SERPL-MCNC: 16 MG/DL
CALCIUM SERPL-MCNC: 9.8 MG/DL
CHLORIDE SERPL-SCNC: 101 MMOL/L
CHOLEST SERPL-MCNC: 200 MG/DL
CHOLEST/HDLC SERPL: 2.7 RATIO
CO2 SERPL-SCNC: 27 MMOL/L
CREAT SERPL-MCNC: 0.76 MG/DL
EOSINOPHIL # BLD AUTO: 0.35 K/UL
EOSINOPHIL NFR BLD AUTO: 4.3 %
GLUCOSE SERPL-MCNC: 97 MG/DL
HCT VFR BLD CALC: 42.5 %
HDLC SERPL-MCNC: 75 MG/DL
HGB BLD-MCNC: 13.3 G/DL
LDLC SERPL CALC-MCNC: 100 MG/DL
LYMPHOCYTES # BLD AUTO: 1.28 K/UL
LYMPHOCYTES NFR BLD AUTO: 15.7 %
MAN DIFF?: NORMAL
MCHC RBC-ENTMCNC: 31 PG
MCHC RBC-ENTMCNC: 31.3 GM/DL
MCV RBC AUTO: 99.1 FL
MONOCYTES # BLD AUTO: 1.76 K/UL
MONOCYTES NFR BLD AUTO: 21.7 %
NEUTROPHILS # BLD AUTO: 4.17 K/UL
NEUTROPHILS NFR BLD AUTO: 50.4 %
PLATELET # BLD AUTO: 319 K/UL
POTASSIUM SERPL-SCNC: 4.4 MMOL/L
PROT SERPL-MCNC: 7.1 G/DL
RBC # BLD: 4.29 M/UL
RBC # FLD: 15.8 %
SODIUM SERPL-SCNC: 142 MMOL/L
TRIGL SERPL-MCNC: 127 MG/DL
WBC # FLD AUTO: 8.13 K/UL

## 2018-06-14 ENCOUNTER — NON-APPOINTMENT (OUTPATIENT)
Age: 73
End: 2018-06-14

## 2018-06-14 ENCOUNTER — APPOINTMENT (OUTPATIENT)
Dept: CARDIOLOGY | Facility: CLINIC | Age: 73
End: 2018-06-14
Payer: MEDICARE

## 2018-06-14 VITALS
OXYGEN SATURATION: 98 % | BODY MASS INDEX: 31.01 KG/M2 | DIASTOLIC BLOOD PRESSURE: 86 MMHG | HEART RATE: 72 BPM | HEIGHT: 63 IN | SYSTOLIC BLOOD PRESSURE: 124 MMHG | WEIGHT: 175 LBS

## 2018-06-14 PROCEDURE — 99214 OFFICE O/P EST MOD 30 MIN: CPT

## 2018-06-14 PROCEDURE — 93000 ELECTROCARDIOGRAM COMPLETE: CPT

## 2018-06-14 RX ORDER — AMOXICILLIN AND CLAVULANATE POTASSIUM 875; 125 MG/1; MG/1
875-125 TABLET, COATED ORAL
Qty: 20 | Refills: 0 | Status: DISCONTINUED | COMMUNITY
Start: 2018-04-20 | End: 2018-06-14

## 2018-06-21 ENCOUNTER — APPOINTMENT (OUTPATIENT)
Dept: HEPATOLOGY | Facility: CLINIC | Age: 73
End: 2018-06-21

## 2018-06-25 ENCOUNTER — APPOINTMENT (OUTPATIENT)
Dept: CARDIOLOGY | Facility: CLINIC | Age: 73
End: 2018-06-25
Payer: MEDICARE

## 2018-06-25 PROCEDURE — 93880 EXTRACRANIAL BILAT STUDY: CPT

## 2018-06-29 ENCOUNTER — RESULT REVIEW (OUTPATIENT)
Age: 73
End: 2018-06-29

## 2018-07-11 ENCOUNTER — RX RENEWAL (OUTPATIENT)
Age: 73
End: 2018-07-11

## 2018-07-30 ENCOUNTER — TRANSCRIPTION ENCOUNTER (OUTPATIENT)
Age: 73
End: 2018-07-30

## 2018-08-01 PROBLEM — F32.9 MAJOR DEPRESSIVE DISORDER, SINGLE EPISODE, UNSPECIFIED: Chronic | Status: ACTIVE | Noted: 2017-12-03

## 2018-08-02 ENCOUNTER — APPOINTMENT (OUTPATIENT)
Dept: INTERNAL MEDICINE | Facility: CLINIC | Age: 73
End: 2018-08-02
Payer: MEDICARE

## 2018-08-02 VITALS
BODY MASS INDEX: 30.48 KG/M2 | SYSTOLIC BLOOD PRESSURE: 124 MMHG | HEIGHT: 63 IN | WEIGHT: 172 LBS | HEART RATE: 76 BPM | RESPIRATION RATE: 16 BRPM | DIASTOLIC BLOOD PRESSURE: 80 MMHG

## 2018-08-02 DIAGNOSIS — K21.9 GASTRO-ESOPHAGEAL REFLUX DISEASE W/OUT ESOPHAGITIS: ICD-10-CM

## 2018-08-02 LAB — CYTOLOGY CVX/VAG DOC THIN PREP: NORMAL

## 2018-08-02 PROCEDURE — 36415 COLL VENOUS BLD VENIPUNCTURE: CPT

## 2018-08-02 PROCEDURE — G0439: CPT

## 2018-08-02 NOTE — PHYSICAL EXAM
[General Appearance - Alert] : alert [General Appearance - In No Acute Distress] : in no acute distress [Sclera] : the sclera and conjunctiva were normal [PERRL With Normal Accommodation] : pupils were equal in size, round, and reactive to light [Extraocular Movements] : extraocular movements were intact [Outer Ear] : the ears and nose were normal in appearance [Oropharynx] : the oropharynx was normal [Neck Appearance] : the appearance of the neck was normal [Neck Cervical Mass (___cm)] : no neck mass was observed [Jugular Venous Distention Increased] : there was no jugular-venous distention [Thyroid Diffuse Enlargement] : the thyroid was not enlarged [Thyroid Nodule] : there were no palpable thyroid nodules [Auscultation Breath Sounds / Voice Sounds] : lungs were clear to auscultation bilaterally [Heart Rate And Rhythm] : heart rate was normal and rhythm regular [Heart Sounds] : normal S1 and S2 [Heart Sounds Gallop] : no gallops [Murmurs] : no murmurs [Heart Sounds Pericardial Friction Rub] : no pericardial rub [Arterial Pulses Carotid] : carotid pulses were normal with no bruits [Abdominal Aorta] : the abdominal aorta was normal [Arterial Pulses Femoral] : femoral pulses were normal without bruits [Full Pulse] : the pedal pulses are present [Edema] : there was no peripheral edema [Veins - Varicosity Changes] : there were no varicosital changes [Bowel Sounds] : normal bowel sounds [Abdomen Soft] : soft [Abdomen Tenderness] : non-tender [Abdomen Mass (___ Cm)] : no abdominal mass palpated [Cervical Lymph Nodes Enlarged Posterior Bilaterally] : posterior cervical [Cervical Lymph Nodes Enlarged Anterior Bilaterally] : anterior cervical [Supraclavicular Lymph Nodes Enlarged Bilaterally] : supraclavicular [Axillary Lymph Nodes Enlarged Bilaterally] : axillary [Femoral Lymph Nodes Enlarged Bilaterally] : femoral [Inguinal Lymph Nodes Enlarged Bilaterally] : inguinal [No Spinal Tenderness] : no spinal tenderness [Abnormal Walk] : normal gait [Nail Clubbing] : no clubbing  or cyanosis of the fingernails [Musculoskeletal - Swelling] : no joint swelling seen [Motor Tone] : muscle strength and tone were normal [Skin Color & Pigmentation] : normal skin color and pigmentation [Skin Turgor] : normal skin turgor [] : no rash [Cranial Nerves] : cranial nerves 2-12 were intact [No Focal Deficits] : no focal deficits [Oriented To Time, Place, And Person] : oriented to person, place, and time [Impaired Insight] : insight and judgment were intact [Affect] : the affect was normal [FreeTextEntry1] : Obese

## 2018-08-02 NOTE — HEALTH RISK ASSESSMENT
[Good] : ~his/her~ current health as good [Fair] :  ~his/her~ mood as fair [Any fall with injury in past year] : Patient reported fall with injury in the past year [0] : 2) Feeling down, depressed, or hopeless: Not at all (0) [None] : None [With Significant Other] : lives with significant other [# of Members in Household ___] :  household currently consist of [unfilled] member(s) [Employed] : employed [High School] : high school [] :  [# Of Children ___] : has [unfilled] children [Sexually Active] : sexually active [Feels Safe at Home] : Feels safe at home [Fully functional (bathing, dressing, toileting, transferring, walking, feeding)] : Fully functional (bathing, dressing, toileting, transferring, walking, feeding) [Fully functional (using the telephone, shopping, preparing meals, housekeeping, doing laundry, using] : Fully functional and needs no help or supervision to perform IADLs (using the telephone, shopping, preparing meals, housekeeping, doing laundry, using transportation, managing medications and managing finances) [Reports changes in hearing] : Reports changes in hearing [Smoke Detector] : smoke detector [Carbon Monoxide Detector] : carbon monoxide detector [Seat Belt] :  uses seat belt [Sunscreen] : uses sunscreen [Discussed at today's visit] : Advance Directives Discussed at today's visit [Designated Healthcare Proxy] : Designated healthcare proxy [Name: ___] : Health Care Proxy's Name: [unfilled]  [] : No [de-identified] : occ [[id number]] : 0 [Change in mental status noted] : No change in mental status noted [Reports changes in vision] : Reports no changes in vision [Reports changes in dental health] : Reports no changes in dental health [Guns at Home] : no guns at home [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

## 2018-08-02 NOTE — COUNSELING
[Healthy eating counseling provided] : healthy eating [Low Fat Diet] : Low fat diet [Decrease Portions] : Decrease food portions [Walking] : Walking [None] : None [Needs reinforcement, provided] : Patient needs reinforcement on understanding lifestyle changes and  the steps needed to achieve self management goals and reinforcement was provided [de-identified] : The patient again was told she should totally abstain from alcohol

## 2018-08-02 NOTE — ASSESSMENT
[FreeTextEntry1] : 73-year-old female with good general health with continued need for lifestyle changes with diet exercise and weight loss.\par A. fib with status post ablation about 2 years ago and clinically remains in sinus rhythm.\par \par Patient status post acute hepatitis secondary to drug-induced liver injury likely secondary to Lipitor with return to normal liver functions.\par \par Workup showed the patient to have liver cirrhosis likely secondary to chronic alcohol intake which was daily. Unfortunately the patient is not totally abstaining from alcohol and again strongly told that she needs to.\par \par Carotid stenosis stable and on Crestor\par \par Patient with chronic anxiety and depression on Zoloft to 75 mg daily\par \par Followup with cardiology and hepatology as scheduled\par \par Bone density January 2018\par Mammography January 2018\par Colonoscopy February 2018\par Endoscopy January 2018\par GYN yearly\par \par Vaccines up to date other than shingles vaccine.\par \par As recommended by hepatology recommend hepatitis A and B. vaccines therefore ordered to be given at zero, one in 6 months\par \par Followup in 3 months\par \par

## 2018-08-02 NOTE — HISTORY OF PRESENT ILLNESS
[FreeTextEntry1] : 73-year-old female with history of atrial fibrillation for which she had ablation about 3 years ago with cardiology followup and remains in sinus rhythm. For this she continues on an aspirin daily.\par \par The patient's significant issue this past year is December 2017 she was admitted and found to have acute hepatitis felt secondary to drug-induced liver injury secondary to Lipitor.\par Evaluation was done including MRIs which showed cirrhosis which was felt to be due to patient's chronic alcohol intake.\par Patient's liver functions have gone back to normal.\par She is following with hepatology.\par The patient does admit she still drinks alcohol occasionally.\par \par Patient with moderate right carotid stenosis which is monitored by vascular and cardiology.\par \par Patient with hyperlipidemia on Crestor.\par \par She did have a right total knee replacement October 2014 which went fairly well but still has occasional discomfort with a known Baker's cyst.\par \par Otherwise she is feeling relatively well without chest pain, palpitations or shortness of breath.\par Her weight is about the same without any regular exercise regimen.\par \par The patient has anxiety and depression and is on Zoloft 50 mg  1-1/2 tabs daily.

## 2018-08-03 ENCOUNTER — RESULT REVIEW (OUTPATIENT)
Age: 73
End: 2018-08-03

## 2018-08-03 ENCOUNTER — RX RENEWAL (OUTPATIENT)
Age: 73
End: 2018-08-03

## 2018-08-03 LAB
25(OH)D3 SERPL-MCNC: 41.5 NG/ML
AFP-TM SERPL-MCNC: 8.4 NG/ML
ALBUMIN SERPL ELPH-MCNC: 4.1 G/DL
ALP BLD-CCNC: 72 U/L
ALT SERPL-CCNC: 17 U/L
ANION GAP SERPL CALC-SCNC: 14 MMOL/L
APPEARANCE: CLEAR
AST SERPL-CCNC: 21 U/L
BACTERIA: NEGATIVE
BASOPHILS # BLD AUTO: 0.02 K/UL
BASOPHILS NFR BLD AUTO: 0.3 %
BILIRUB SERPL-MCNC: 1 MG/DL
BILIRUBIN URINE: NEGATIVE
BLOOD URINE: ABNORMAL
BUN SERPL-MCNC: 12 MG/DL
CALCIUM SERPL-MCNC: 9.7 MG/DL
CHLORIDE SERPL-SCNC: 102 MMOL/L
CHOLEST SERPL-MCNC: 184 MG/DL
CHOLEST/HDLC SERPL: 2.7 RATIO
CO2 SERPL-SCNC: 26 MMOL/L
COLOR: YELLOW
CREAT SERPL-MCNC: 0.72 MG/DL
EOSINOPHIL # BLD AUTO: 0.18 K/UL
EOSINOPHIL NFR BLD AUTO: 3 %
GLUCOSE QUALITATIVE U: NEGATIVE MG/DL
GLUCOSE SERPL-MCNC: 104 MG/DL
HBA1C MFR BLD HPLC: 5.8 %
HCT VFR BLD CALC: 45 %
HDLC SERPL-MCNC: 68 MG/DL
HGB BLD-MCNC: 14.4 G/DL
HYALINE CASTS: 0 /LPF
IMM GRANULOCYTES NFR BLD AUTO: 0.2 %
KETONES URINE: NEGATIVE
LDLC SERPL CALC-MCNC: 92 MG/DL
LEUKOCYTE ESTERASE URINE: ABNORMAL
LYMPHOCYTES # BLD AUTO: 1.28 K/UL
LYMPHOCYTES NFR BLD AUTO: 21.2 %
MAN DIFF?: NORMAL
MCHC RBC-ENTMCNC: 32 GM/DL
MCHC RBC-ENTMCNC: 32.2 PG
MCV RBC AUTO: 100.7 FL
MICROSCOPIC-UA: NORMAL
MONOCYTES # BLD AUTO: 0.8 K/UL
MONOCYTES NFR BLD AUTO: 13.2 %
NEUTROPHILS # BLD AUTO: 3.76 K/UL
NEUTROPHILS NFR BLD AUTO: 62.1 %
NITRITE URINE: NEGATIVE
PH URINE: 6
PLATELET # BLD AUTO: 328 K/UL
POTASSIUM SERPL-SCNC: 4.3 MMOL/L
PROT SERPL-MCNC: 6.8 G/DL
PROTEIN URINE: NEGATIVE MG/DL
RBC # BLD: 4.47 M/UL
RBC # FLD: 16.1 %
RED BLOOD CELLS URINE: 5 /HPF
SODIUM SERPL-SCNC: 142 MMOL/L
SPECIFIC GRAVITY URINE: 1.01
SQUAMOUS EPITHELIAL CELLS: 15 /HPF
TRIGL SERPL-MCNC: 120 MG/DL
UROBILINOGEN URINE: NEGATIVE MG/DL
WBC # FLD AUTO: 6.05 K/UL
WHITE BLOOD CELLS URINE: 85 /HPF

## 2018-08-03 RX ORDER — HEPATITIS A AND HEPATITIS B (RECOMBINANT) VACCINE 720; 20 [IU]/ML; UG/ML
INJECTION, SUSPENSION INTRAMUSCULAR
Qty: 1 | Refills: 2 | Status: DISCONTINUED | COMMUNITY
Start: 2018-08-02 | End: 2018-08-03

## 2018-08-07 ENCOUNTER — RESULT CHARGE (OUTPATIENT)
Age: 73
End: 2018-08-07

## 2018-08-08 ENCOUNTER — APPOINTMENT (OUTPATIENT)
Dept: INTERNAL MEDICINE | Facility: CLINIC | Age: 73
End: 2018-08-08
Payer: MEDICARE

## 2018-08-08 VITALS — HEART RATE: 70 BPM | DIASTOLIC BLOOD PRESSURE: 80 MMHG | SYSTOLIC BLOOD PRESSURE: 125 MMHG

## 2018-08-08 DIAGNOSIS — Z87.09 PERSONAL HISTORY OF OTHER DISEASES OF THE RESPIRATORY SYSTEM: ICD-10-CM

## 2018-08-08 PROCEDURE — G0010: CPT | Mod: NC

## 2018-08-08 PROCEDURE — 69209 REMOVE IMPACTED EAR WAX UNI: CPT | Mod: 50

## 2018-08-08 PROCEDURE — 90746 HEPB VACCINE 3 DOSE ADULT IM: CPT

## 2018-08-08 PROCEDURE — 99214 OFFICE O/P EST MOD 30 MIN: CPT | Mod: 25

## 2018-08-08 PROCEDURE — 90632 HEPA VACCINE ADULT IM: CPT | Mod: NC,GY

## 2018-08-08 PROCEDURE — 90472 IMMUNIZATION ADMIN EACH ADD: CPT

## 2018-08-08 NOTE — HISTORY OF PRESENT ILLNESS
[FreeTextEntry8] : Patient presents for\par 1. Hepatitis A and hepatitis B vaccine,Needs in the setting of cirrhosis\par 2. Cerumen removal

## 2018-08-08 NOTE — PHYSICAL EXAM
[No Acute Distress] : no acute distress [No Respiratory Distress] : no respiratory distress  [Clear to Auscultation] : lungs were clear to auscultation bilaterally [Normal Rate] : normal rate  [Regular Rhythm] : with a regular rhythm [Normal Affect] : the affect was normal [Normal Mood] : the mood was normal [de-identified] : +Cerumen impaction bilateral status post irrigation with excellent results, patient tolerated well

## 2018-08-08 NOTE — ASSESSMENT
[FreeTextEntry1] : Hepatitis A and hepatitis B vaccine given. Status post ear irrigation with excellent results. Patient will return to the office as scheduled for regular followup/hepatitis B #2 in one month/hepatitis B #3 and hepatitis A #2 in February of 2019

## 2018-08-23 ENCOUNTER — APPOINTMENT (OUTPATIENT)
Dept: ORTHOPEDIC SURGERY | Facility: CLINIC | Age: 73
End: 2018-08-23
Payer: MEDICARE

## 2018-08-23 ENCOUNTER — APPOINTMENT (OUTPATIENT)
Dept: HEPATOLOGY | Facility: CLINIC | Age: 73
End: 2018-08-23
Payer: MEDICARE

## 2018-08-23 VITALS
OXYGEN SATURATION: 97 % | HEIGHT: 60 IN | HEART RATE: 72 BPM | SYSTOLIC BLOOD PRESSURE: 102 MMHG | BODY MASS INDEX: 35.53 KG/M2 | WEIGHT: 181 LBS | TEMPERATURE: 97.8 F | DIASTOLIC BLOOD PRESSURE: 60 MMHG | RESPIRATION RATE: 15 BRPM

## 2018-08-23 VITALS
HEART RATE: 81 BPM | HEIGHT: 60 IN | SYSTOLIC BLOOD PRESSURE: 151 MMHG | BODY MASS INDEX: 35.53 KG/M2 | WEIGHT: 181 LBS | DIASTOLIC BLOOD PRESSURE: 79 MMHG

## 2018-08-23 DIAGNOSIS — Z87.39 PERSONAL HISTORY OF OTHER DISEASES OF THE MUSCULOSKELETAL SYSTEM AND CONNECTIVE TISSUE: ICD-10-CM

## 2018-08-23 DIAGNOSIS — M47.812 SPONDYLOSIS W/OUT MYELOPATHY OR RADICULOPATHY, CERVICAL REGION: ICD-10-CM

## 2018-08-23 DIAGNOSIS — Z86.79 PERSONAL HISTORY OF OTHER DISEASES OF THE CIRCULATORY SYSTEM: ICD-10-CM

## 2018-08-23 PROCEDURE — 99214 OFFICE O/P EST MOD 30 MIN: CPT | Mod: 25

## 2018-08-23 PROCEDURE — 99214 OFFICE O/P EST MOD 30 MIN: CPT

## 2018-08-23 PROCEDURE — 91200 LIVER ELASTOGRAPHY: CPT

## 2018-08-23 PROCEDURE — ZZZZZ: CPT

## 2018-08-23 RX ORDER — LUTEIN 6 MG
TABLET ORAL
Refills: 0 | Status: ACTIVE | COMMUNITY

## 2018-08-23 RX ORDER — SENNOSIDES 8.6 MG
TABLET ORAL
Refills: 0 | Status: ACTIVE | COMMUNITY

## 2018-08-31 PROBLEM — Z87.39 HISTORY OF ARTHRITIS: Status: RESOLVED | Noted: 2018-08-23 | Resolved: 2018-08-31

## 2018-09-05 ENCOUNTER — APPOINTMENT (OUTPATIENT)
Dept: ORTHOPEDIC SURGERY | Facility: CLINIC | Age: 73
End: 2018-09-05
Payer: MEDICARE

## 2018-09-05 VITALS
SYSTOLIC BLOOD PRESSURE: 164 MMHG | DIASTOLIC BLOOD PRESSURE: 90 MMHG | HEART RATE: 72 BPM | HEIGHT: 60 IN | BODY MASS INDEX: 35.53 KG/M2 | WEIGHT: 181 LBS

## 2018-09-05 DIAGNOSIS — M47.812 SPONDYLOSIS W/OUT MYELOPATHY OR RADICULOPATHY, CERVICAL REGION: ICD-10-CM

## 2018-09-05 PROCEDURE — 99214 OFFICE O/P EST MOD 30 MIN: CPT

## 2018-09-12 ENCOUNTER — RX RENEWAL (OUTPATIENT)
Age: 73
End: 2018-09-12

## 2018-09-20 ENCOUNTER — APPOINTMENT (OUTPATIENT)
Dept: INTERNAL MEDICINE | Facility: CLINIC | Age: 73
End: 2018-09-20

## 2018-09-20 ENCOUNTER — OUTPATIENT (OUTPATIENT)
Dept: OUTPATIENT SERVICES | Facility: HOSPITAL | Age: 73
LOS: 1 days | End: 2018-09-20
Payer: MEDICARE

## 2018-09-20 DIAGNOSIS — Z98.89 OTHER SPECIFIED POSTPROCEDURAL STATES: Chronic | ICD-10-CM

## 2018-09-20 DIAGNOSIS — Z51.89 ENCOUNTER FOR OTHER SPECIFIED AFTERCARE: ICD-10-CM

## 2018-09-20 DIAGNOSIS — M54.2 CERVICALGIA: ICD-10-CM

## 2018-09-20 DIAGNOSIS — H26.40 UNSPECIFIED SECONDARY CATARACT: Chronic | ICD-10-CM

## 2018-10-23 PROCEDURE — 97162 PT EVAL MOD COMPLEX 30 MIN: CPT

## 2018-10-23 PROCEDURE — G8983: CPT | Mod: CI

## 2018-10-23 PROCEDURE — 97140 MANUAL THERAPY 1/> REGIONS: CPT

## 2018-10-23 PROCEDURE — G8981: CPT | Mod: CL

## 2018-10-23 PROCEDURE — 97110 THERAPEUTIC EXERCISES: CPT

## 2018-10-23 PROCEDURE — 97010 HOT OR COLD PACKS THERAPY: CPT

## 2018-10-23 PROCEDURE — G8982: CPT | Mod: CK

## 2018-10-24 ENCOUNTER — RX RENEWAL (OUTPATIENT)
Age: 73
End: 2018-10-24

## 2018-11-09 ENCOUNTER — APPOINTMENT (OUTPATIENT)
Dept: INTERNAL MEDICINE | Facility: CLINIC | Age: 73
End: 2018-11-09
Payer: MEDICARE

## 2018-11-09 VITALS
HEIGHT: 60 IN | DIASTOLIC BLOOD PRESSURE: 80 MMHG | WEIGHT: 177 LBS | SYSTOLIC BLOOD PRESSURE: 130 MMHG | BODY MASS INDEX: 34.75 KG/M2 | HEART RATE: 77 BPM | OXYGEN SATURATION: 98 %

## 2018-11-09 DIAGNOSIS — H61.23 IMPACTED CERUMEN, BILATERAL: ICD-10-CM

## 2018-11-09 PROCEDURE — 99214 OFFICE O/P EST MOD 30 MIN: CPT | Mod: 25

## 2018-11-09 PROCEDURE — 36415 COLL VENOUS BLD VENIPUNCTURE: CPT

## 2018-11-09 PROCEDURE — 90746 HEPB VACCINE 3 DOSE ADULT IM: CPT | Mod: NC

## 2018-11-13 ENCOUNTER — RESULT REVIEW (OUTPATIENT)
Age: 73
End: 2018-11-13

## 2018-11-13 LAB
ALBUMIN SERPL ELPH-MCNC: 4.6 G/DL
ALP BLD-CCNC: 73 U/L
ALT SERPL-CCNC: 23 U/L
ANION GAP SERPL CALC-SCNC: 17 MMOL/L
APPEARANCE: CLEAR
AST SERPL-CCNC: 24 U/L
BACTERIA: NEGATIVE
BASOPHILS # BLD AUTO: 0.05 K/UL
BASOPHILS NFR BLD AUTO: 1 %
BILIRUB SERPL-MCNC: 1 MG/DL
BILIRUBIN URINE: NEGATIVE
BLOOD URINE: NEGATIVE
BUN SERPL-MCNC: 18 MG/DL
CALCIUM SERPL-MCNC: 10 MG/DL
CHLORIDE SERPL-SCNC: 101 MMOL/L
CHOLEST SERPL-MCNC: 212 MG/DL
CHOLEST/HDLC SERPL: 3 RATIO
CO2 SERPL-SCNC: 22 MMOL/L
COLOR: YELLOW
CREAT SERPL-MCNC: 0.74 MG/DL
EOSINOPHIL # BLD AUTO: 0.17 K/UL
EOSINOPHIL NFR BLD AUTO: 3.4 %
GLUCOSE QUALITATIVE U: NEGATIVE MG/DL
GLUCOSE SERPL-MCNC: 98 MG/DL
HBA1C MFR BLD HPLC: 5.4 %
HCT VFR BLD CALC: 46 %
HDLC SERPL-MCNC: 70 MG/DL
HGB BLD-MCNC: 15.6 G/DL
HYALINE CASTS: 2 /LPF
IMM GRANULOCYTES NFR BLD AUTO: 0.2 %
KETONES URINE: NEGATIVE
LDLC SERPL CALC-MCNC: 120 MG/DL
LEUKOCYTE ESTERASE URINE: NEGATIVE
LYMPHOCYTES # BLD AUTO: 1.63 K/UL
LYMPHOCYTES NFR BLD AUTO: 32.7 %
MAN DIFF?: NORMAL
MCHC RBC-ENTMCNC: 33.9 GM/DL
MCHC RBC-ENTMCNC: 34.4 PG
MCV RBC AUTO: 101.3 FL
MICROSCOPIC-UA: NORMAL
MONOCYTES # BLD AUTO: 0.78 K/UL
MONOCYTES NFR BLD AUTO: 15.7 %
NEUTROPHILS # BLD AUTO: 2.34 K/UL
NEUTROPHILS NFR BLD AUTO: 47 %
NITRITE URINE: NEGATIVE
PH URINE: 6.5
PLATELET # BLD AUTO: 317 K/UL
POTASSIUM SERPL-SCNC: 4.4 MMOL/L
PROT SERPL-MCNC: 7.4 G/DL
PROTEIN URINE: NEGATIVE MG/DL
RBC # BLD: 4.54 M/UL
RBC # FLD: 13.8 %
RED BLOOD CELLS URINE: 3 /HPF
SODIUM SERPL-SCNC: 140 MMOL/L
SPECIFIC GRAVITY URINE: 1.02
SQUAMOUS EPITHELIAL CELLS: 0 /HPF
TRIGL SERPL-MCNC: 111 MG/DL
UROBILINOGEN URINE: NEGATIVE MG/DL
WBC # FLD AUTO: 4.98 K/UL
WHITE BLOOD CELLS URINE: 2 /HPF

## 2018-11-13 NOTE — ASSESSMENT
[FreeTextEntry1] : Labs sent out and further recommendations will be made based on lab results. Patient advised to continue present medications with diet/exercise and specialists followup.Hepatitis B vaccine #2 given. Patient  will return to the office in 3months with HEP B #3 and Hep A #2\par \par Discussed shingles vaccine\par Bone density-UTD=1/18\par [id number]-ZHW-4/18-Rx given for followup\par Colonoscopy 2/18\par Endoscopy 1/18\par Specialists include\par 1. GI-Dr. Lang\par 2. Cardiology-Dr. Brooke\par 3. GYN-Dr. Zuñiga\par 4. Ophthalmology-Dr. Eric anderson\par 5. Orthopedic p.r.n.-Dr. Read/Dr. Thompson\par 6. Neurology-Dr. Ashraf\par 7. Vascular-Dr. David\par 8. Hepatologist- \par Carotid sonogram via vascular 6/2018\par Hepatitis C screening in the past was negative, declines HIV screening

## 2018-11-13 NOTE — HISTORY OF PRESENT ILLNESS
[None] : No weight lost attempts [Needs reinforcement, provided] : Patient needs reinforcement on understanding of disease, goals and obesity follow-up plan; reinforcement was provided [FreeTextEntry1] : On anxiety/obesity/PAF [de-identified] : Patient presents for followup on anxiety/obesity/PAF. Patient is currently fasting for today's labs. Patient is currently on Zoloft for anxiety, on aspirin for PAF and has not made any lifestyle changes as of yet to improve her obesity,Continues on Crestor for hyperlipidemia.\par -Previous UA showed RBCs, to be repeated\par

## 2018-11-16 ENCOUNTER — APPOINTMENT (OUTPATIENT)
Dept: ORTHOPEDIC SURGERY | Facility: CLINIC | Age: 73
End: 2018-11-16
Payer: MEDICARE

## 2018-11-16 VITALS
SYSTOLIC BLOOD PRESSURE: 143 MMHG | WEIGHT: 170 LBS | HEIGHT: 61 IN | HEART RATE: 86 BPM | BODY MASS INDEX: 32.1 KG/M2 | DIASTOLIC BLOOD PRESSURE: 88 MMHG

## 2018-11-16 DIAGNOSIS — M17.12 UNILATERAL PRIMARY OSTEOARTHRITIS, LEFT KNEE: ICD-10-CM

## 2018-11-16 PROCEDURE — 20610 DRAIN/INJ JOINT/BURSA W/O US: CPT | Mod: LT

## 2018-11-16 PROCEDURE — 99214 OFFICE O/P EST MOD 30 MIN: CPT | Mod: 25

## 2018-11-16 PROCEDURE — 73562 X-RAY EXAM OF KNEE 3: CPT | Mod: LT

## 2018-11-30 ENCOUNTER — APPOINTMENT (OUTPATIENT)
Dept: NEUROLOGY | Facility: CLINIC | Age: 73
End: 2018-11-30
Payer: MEDICARE

## 2018-11-30 VITALS
BODY MASS INDEX: 32.1 KG/M2 | WEIGHT: 170 LBS | DIASTOLIC BLOOD PRESSURE: 82 MMHG | SYSTOLIC BLOOD PRESSURE: 124 MMHG | HEIGHT: 61 IN

## 2018-11-30 PROCEDURE — 99213 OFFICE O/P EST LOW 20 MIN: CPT

## 2018-12-07 ENCOUNTER — APPOINTMENT (OUTPATIENT)
Dept: HEPATOLOGY | Facility: CLINIC | Age: 73
End: 2018-12-07

## 2018-12-07 ENCOUNTER — APPOINTMENT (OUTPATIENT)
Dept: HEPATOLOGY | Facility: CLINIC | Age: 73
End: 2018-12-07
Payer: MEDICARE

## 2018-12-07 VITALS
WEIGHT: 179 LBS | BODY MASS INDEX: 33.79 KG/M2 | HEIGHT: 61 IN | RESPIRATION RATE: 19 BRPM | HEART RATE: 76 BPM | SYSTOLIC BLOOD PRESSURE: 145 MMHG | DIASTOLIC BLOOD PRESSURE: 87 MMHG | TEMPERATURE: 98.6 F

## 2018-12-07 PROCEDURE — 99214 OFFICE O/P EST MOD 30 MIN: CPT

## 2018-12-10 ENCOUNTER — APPOINTMENT (OUTPATIENT)
Dept: CARDIOLOGY | Facility: CLINIC | Age: 73
End: 2018-12-10

## 2018-12-17 LAB
AFP-TM SERPL-MCNC: 8 NG/ML
ALBUMIN SERPL ELPH-MCNC: 4.2 G/DL
ALP BLD-CCNC: 67 U/L
ALT SERPL-CCNC: 19 U/L
ANION GAP SERPL CALC-SCNC: 10 MMOL/L
AST SERPL-CCNC: 21 U/L
BASOPHILS # BLD AUTO: 0.04 K/UL
BASOPHILS NFR BLD AUTO: 0.7 %
BILIRUB SERPL-MCNC: 0.6 MG/DL
BUN SERPL-MCNC: 13 MG/DL
CALCIUM SERPL-MCNC: 9.2 MG/DL
CHLORIDE SERPL-SCNC: 107 MMOL/L
CO2 SERPL-SCNC: 26 MMOL/L
CREAT SERPL-MCNC: 0.68 MG/DL
EOSINOPHIL # BLD AUTO: 0.16 K/UL
EOSINOPHIL NFR BLD AUTO: 3 %
GLUCOSE SERPL-MCNC: 130 MG/DL
HCT VFR BLD CALC: 42.9 %
HGB BLD-MCNC: 13.8 G/DL
IMM GRANULOCYTES NFR BLD AUTO: 0.2 %
INR PPP: 0.96 RATIO
LYMPHOCYTES # BLD AUTO: 1.22 K/UL
LYMPHOCYTES NFR BLD AUTO: 22.6 %
MAN DIFF?: NORMAL
MCHC RBC-ENTMCNC: 32.2 GM/DL
MCHC RBC-ENTMCNC: 32.5 PG
MCV RBC AUTO: 101.2 FL
MONOCYTES # BLD AUTO: 0.45 K/UL
MONOCYTES NFR BLD AUTO: 8.3 %
NEUTROPHILS # BLD AUTO: 3.52 K/UL
NEUTROPHILS NFR BLD AUTO: 65.2 %
PLATELET # BLD AUTO: 317 K/UL
POTASSIUM SERPL-SCNC: 4 MMOL/L
PROT SERPL-MCNC: 6.2 G/DL
PT BLD: 10.7 SEC
RBC # BLD: 4.24 M/UL
RBC # FLD: 14.2 %
SODIUM SERPL-SCNC: 143 MMOL/L
WBC # FLD AUTO: 5.4 K/UL

## 2019-01-08 ENCOUNTER — APPOINTMENT (OUTPATIENT)
Dept: CARDIOLOGY | Facility: CLINIC | Age: 74
End: 2019-01-08
Payer: MEDICARE

## 2019-01-08 VITALS
SYSTOLIC BLOOD PRESSURE: 133 MMHG | OXYGEN SATURATION: 95 % | BODY MASS INDEX: 34.36 KG/M2 | HEART RATE: 71 BPM | HEIGHT: 60 IN | WEIGHT: 175 LBS | DIASTOLIC BLOOD PRESSURE: 33 MMHG

## 2019-01-08 VITALS — SYSTOLIC BLOOD PRESSURE: 130 MMHG | DIASTOLIC BLOOD PRESSURE: 82 MMHG

## 2019-01-08 PROCEDURE — 99214 OFFICE O/P EST MOD 30 MIN: CPT

## 2019-01-08 PROCEDURE — 93000 ELECTROCARDIOGRAM COMPLETE: CPT

## 2019-01-08 NOTE — REASON FOR VISIT
[Follow-Up - Clinic] : a clinic follow-up of [Atrial Fibrillation] : atrial fibrillation [Hyperlipidemia] : hyperlipidemia

## 2019-01-13 ENCOUNTER — NON-APPOINTMENT (OUTPATIENT)
Age: 74
End: 2019-01-13

## 2019-01-13 NOTE — PHYSICAL EXAM
[Normal Appearance] : normal appearance [Normal Conjunctiva] : the conjunctiva exhibited no abnormalities [Normal Oral Mucosa] : normal oral mucosa [No Oral Pallor] : no oral pallor [Normal Oropharynx] : normal oropharynx [Normal Jugular Venous A Waves Present] : normal jugular venous A waves present [Normal Jugular Venous V Waves Present] : normal jugular venous V waves present [Respiration, Rhythm And Depth] : normal respiratory rhythm and effort [Auscultation Breath Sounds / Voice Sounds] : lungs were clear to auscultation bilaterally [Heart Rate And Rhythm] : heart rate and rhythm were normal [Heart Sounds] : normal S1 and S2 [Arterial Pulses Normal] : the arterial pulses were normal [Edema] : no peripheral edema present [Bowel Sounds] : normal bowel sounds [Abdomen Soft] : soft [Abdomen Tenderness] : non-tender [Abnormal Walk] : normal gait [Nail Clubbing] : no clubbing of the fingernails [Cyanosis, Localized] : no localized cyanosis [Petechial Hemorrhages (___cm)] : no petechial hemorrhages [Skin Turgor] : normal skin turgor [] : no rash [No Venous Stasis] : no venous stasis [Oriented To Time, Place, And Person] : oriented to person, place, and time [Impaired Insight] : insight and judgment were intact [Affect] : the affect was normal [FreeTextEntry1] : 3/6 tiburcio TATE

## 2019-01-13 NOTE — HISTORY OF PRESENT ILLNESS
[FreeTextEntry1] : She gets palpitations every day but short in duration, no more cp. SOB is about the same as before, gained weight.\par Compliant with statin therapy. \par No lightheadedness, taking asa daily\par \par 1/8/2019\par Pt is here for routine f/u. Reports of feeling fine. Still having palpitations almost daily for few minutes. Has baseline fatigue because of poor sleeping habits. Occasional PAGE with over exertion or when bending. Denies chest pain, shortness of breath at rest, fatigue, syncope or near syncope, orthopnea and PND. Compliant with medications. No regular exercise but active at home and work. Planning to resume soon. No activity intolerance. No leg edema.

## 2019-01-13 NOTE — REVIEW OF SYSTEMS
[Eyeglasses] : currently wearing eyeglasses [Dyspnea on exertion] : dyspnea during exertion [Palpitations] : palpitations [Joint Pain] : joint pain [Muscle Cramps] : muscle cramps [Fever] : no fever [Headache] : no headache [Chills] : no chills [Feeling Fatigued] : not feeling fatigued [Blurry Vision] : no blurred vision [Seeing Double (Diplopia)] : no diplopia [Eye Pain] : no eye pain [Earache] : no earache [Discharge From The Ears] : no discharge from the ears [Loss Of Hearing] : no hearing loss [Sore Throat] : no sore throat [Sinus Pressure] : no sinus pressure [Shortness Of Breath] : no shortness of breath [Chest  Pressure] : no chest pressure [Chest Pain] : no chest pain [Lower Ext Edema] : no extremity edema [Leg Claudication] : no intermittent leg claudication [Cough] : no cough [Wheezing] : no wheezing [Coughing Up Blood] : no hemoptysis [Abdominal Pain] : no abdominal pain [Nausea] : no nausea [Vomiting] : no vomiting [Heartburn] : no heartburn [Change in Appetite] : no change in appetite [Change In The Stool] : no change in stool [Dysphagia] : no dysphagia [Dysuria] : no dysuria [Incontinence] : no incontinence [Vaginal Discharge] : no vaginal discharge [Joint Swelling] : no joint swelling [Skin: A Rash] : no rash: [Itching] : no itching [Skin Lesions] : no skin lesions [Dizziness] : no dizziness [Tremor] : no tremor was seen [Numbness (Hypesthesia)] : no numbness [Convulsions] : no convulsions [Tingling (Paresthesia)] : no tingling [Confusion] : no confusion was observed [Memory Lapses Or Loss] : no memory lapses or loss [Anxiety] : no anxiety [Under Stress] : not under stress [Excessive Thirst] : no polydipsia [Easy Bleeding] : no tendency for easy bleeding [Swollen Glands] : no swollen glands [Easy Bruising] : no tendency for easy bruising [Swollen Glands In The Neck] : no swollen glands in the neck

## 2019-01-17 ENCOUNTER — APPOINTMENT (OUTPATIENT)
Dept: CARDIOLOGY | Facility: CLINIC | Age: 74
End: 2019-01-17

## 2019-01-24 ENCOUNTER — APPOINTMENT (OUTPATIENT)
Dept: CARDIOLOGY | Facility: CLINIC | Age: 74
End: 2019-01-24
Payer: MEDICARE

## 2019-01-24 PROCEDURE — 93306 TTE W/DOPPLER COMPLETE: CPT

## 2019-02-20 ENCOUNTER — APPOINTMENT (OUTPATIENT)
Dept: INTERNAL MEDICINE | Facility: CLINIC | Age: 74
End: 2019-02-20
Payer: MEDICARE

## 2019-02-20 ENCOUNTER — LABORATORY RESULT (OUTPATIENT)
Age: 74
End: 2019-02-20

## 2019-02-20 VITALS
WEIGHT: 179 LBS | DIASTOLIC BLOOD PRESSURE: 80 MMHG | SYSTOLIC BLOOD PRESSURE: 124 MMHG | HEIGHT: 60 IN | BODY MASS INDEX: 35.14 KG/M2 | HEART RATE: 75 BPM

## 2019-02-20 PROCEDURE — G0010: CPT

## 2019-02-20 PROCEDURE — 99213 OFFICE O/P EST LOW 20 MIN: CPT | Mod: 25

## 2019-02-20 PROCEDURE — 36415 COLL VENOUS BLD VENIPUNCTURE: CPT

## 2019-02-20 PROCEDURE — 90746 HEPB VACCINE 3 DOSE ADULT IM: CPT

## 2019-02-20 PROCEDURE — 90472 IMMUNIZATION ADMIN EACH ADD: CPT | Mod: GY

## 2019-02-20 PROCEDURE — 90632 HEPA VACCINE ADULT IM: CPT | Mod: GY

## 2019-02-20 NOTE — HISTORY OF PRESENT ILLNESS
[FreeTextEntry1] : On anxiety/obesity/PAF [de-identified] : Patient presents for followup on anxiety/obesity/PAF. Patient is currently fasting for today's labs/no complaints. Patient is currently on Zoloft for anxiety, on aspirin for PAF and has not made any lifestyle changes as of yet to improve her obesity. \par -stopped crestor 2 days ago "bothering me", to try QOD dosing\par -due for Hep A and hep B shots to complete series\par \par

## 2019-02-20 NOTE — ASSESSMENT
[FreeTextEntry1] : Labs sent out and further recommendations will be made based on lab results. Patient advised to continue present medications with diet/exercise and specialists followup.Hepatitis B vaccine #3 given/Hep A #2 given. Patient  will return to the office in 3months with CPE in aug\par \par Discussed shingles vaccine\par Bone density-UTD=1/18\par [id number]-EWY-2/19\par Colonoscopy 2/18\par Endoscopy 1/18\par Specialists include\par 1. GI-Dr. Lang\par 2. Cardiology-Dr. Brooke\par 3. GYN-Dr. Zuñiga\par 4. Ophthalmology-Dr. Eric anderson\par 5. Orthopedic p.r.n.-Dr. Read/Dr. Thompson\par 6. Neurology-Dr. Ashraf\par 7. Vascular-Dr. David\par 8. Hepatologist- \par Carotid sonogram via vascular 6/2018\par Hepatitis C screening in the past was negative, declines HIV screening

## 2019-02-21 LAB
ALBUMIN SERPL ELPH-MCNC: 4.2 G/DL
ALP BLD-CCNC: 74 U/L
ALT SERPL-CCNC: 23 U/L
ANION GAP SERPL CALC-SCNC: 13 MMOL/L
AST SERPL-CCNC: 23 U/L
BASOPHILS # BLD AUTO: 0.08 K/UL
BASOPHILS NFR BLD AUTO: 1.3 %
BILIRUB SERPL-MCNC: 0.6 MG/DL
BUN SERPL-MCNC: 18 MG/DL
CALCIUM SERPL-MCNC: 9.5 MG/DL
CHLORIDE SERPL-SCNC: 106 MMOL/L
CHOLEST SERPL-MCNC: 214 MG/DL
CHOLEST/HDLC SERPL: 3.1 RATIO
CO2 SERPL-SCNC: 24 MMOL/L
CREAT SERPL-MCNC: 0.73 MG/DL
EOSINOPHIL # BLD AUTO: 0.23 K/UL
EOSINOPHIL NFR BLD AUTO: 3.7 %
GLUCOSE SERPL-MCNC: 101 MG/DL
HBA1C MFR BLD HPLC: 5.6 %
HCT VFR BLD CALC: 44.8 %
HDLC SERPL-MCNC: 69 MG/DL
HGB BLD-MCNC: 13.9 G/DL
IMM GRANULOCYTES NFR BLD AUTO: 0.3 %
LDLC SERPL CALC-MCNC: 124 MG/DL
LYMPHOCYTES # BLD AUTO: 1.81 K/UL
LYMPHOCYTES NFR BLD AUTO: 29 %
MAN DIFF?: NORMAL
MCHC RBC-ENTMCNC: 31 GM/DL
MCHC RBC-ENTMCNC: 32.6 PG
MCV RBC AUTO: 105.2 FL
MONOCYTES # BLD AUTO: 0.88 K/UL
MONOCYTES NFR BLD AUTO: 14.1 %
NEUTROPHILS # BLD AUTO: 3.23 K/UL
NEUTROPHILS NFR BLD AUTO: 51.6 %
PLATELET # BLD AUTO: 329 K/UL
POTASSIUM SERPL-SCNC: 4.5 MMOL/L
PROT SERPL-MCNC: 6.9 G/DL
RBC # BLD: 4.26 M/UL
RBC # FLD: 13.2 %
SODIUM SERPL-SCNC: 143 MMOL/L
TRIGL SERPL-MCNC: 107 MG/DL
TSH SERPL-ACNC: 2.64 UIU/ML
WBC # FLD AUTO: 6.25 K/UL

## 2019-02-22 ENCOUNTER — RX RENEWAL (OUTPATIENT)
Age: 74
End: 2019-02-22

## 2019-03-08 ENCOUNTER — APPOINTMENT (OUTPATIENT)
Dept: HEPATOLOGY | Facility: CLINIC | Age: 74
End: 2019-03-08

## 2019-04-19 ENCOUNTER — APPOINTMENT (OUTPATIENT)
Dept: HEPATOLOGY | Facility: CLINIC | Age: 74
End: 2019-04-19
Payer: MEDICARE

## 2019-04-19 VITALS
RESPIRATION RATE: 19 BRPM | TEMPERATURE: 98 F | DIASTOLIC BLOOD PRESSURE: 81 MMHG | SYSTOLIC BLOOD PRESSURE: 122 MMHG | HEIGHT: 60 IN | HEART RATE: 66 BPM

## 2019-04-19 DIAGNOSIS — R93.2 ABNORMAL FINDINGS ON DIAGNOSTIC IMAGING OF LIVER AND BILIARY TRACT: ICD-10-CM

## 2019-04-19 LAB
AFP-TM SERPL-MCNC: 6.8 NG/ML
ALBUMIN SERPL ELPH-MCNC: 4.2 G/DL
ALP BLD-CCNC: 74 U/L
ALT SERPL-CCNC: 22 U/L
ANION GAP SERPL CALC-SCNC: 15 MMOL/L
AST SERPL-CCNC: 29 U/L
BASOPHILS # BLD AUTO: 0.06 K/UL
BASOPHILS NFR BLD AUTO: 0.9 %
BILIRUB SERPL-MCNC: 0.7 MG/DL
BUN SERPL-MCNC: 13 MG/DL
CALCIUM SERPL-MCNC: 9.7 MG/DL
CHLORIDE SERPL-SCNC: 107 MMOL/L
CO2 SERPL-SCNC: 22 MMOL/L
CREAT SERPL-MCNC: 0.64 MG/DL
EOSINOPHIL # BLD AUTO: 0.12 K/UL
EOSINOPHIL NFR BLD AUTO: 1.8 %
GLUCOSE SERPL-MCNC: 104 MG/DL
HBV CORE IGG+IGM SER QL: NONREACTIVE
HBV SURFACE AB SER QL: NONREACTIVE
HBV SURFACE AG SER QL: NONREACTIVE
HCT VFR BLD CALC: 45.4 %
HEPATITIS A IGG ANTIBODY: REACTIVE
HGB BLD-MCNC: 14.2 G/DL
IMM GRANULOCYTES NFR BLD AUTO: 0.1 %
INR PPP: 0.92 RATIO
LYMPHOCYTES # BLD AUTO: 1.37 K/UL
LYMPHOCYTES NFR BLD AUTO: 20.4 %
MAN DIFF?: NORMAL
MCHC RBC-ENTMCNC: 31.3 GM/DL
MCHC RBC-ENTMCNC: 32.5 PG
MCV RBC AUTO: 103.9 FL
MONOCYTES # BLD AUTO: 0.95 K/UL
MONOCYTES NFR BLD AUTO: 14.1 %
NEUTROPHILS # BLD AUTO: 4.22 K/UL
NEUTROPHILS NFR BLD AUTO: 62.7 %
PLATELET # BLD AUTO: 351 K/UL
POTASSIUM SERPL-SCNC: 4.3 MMOL/L
PROT SERPL-MCNC: 6.7 G/DL
PT BLD: 10.6 SEC
RBC # BLD: 4.37 M/UL
RBC # FLD: 13.3 %
SODIUM SERPL-SCNC: 144 MMOL/L
WBC # FLD AUTO: 6.73 K/UL

## 2019-04-19 PROCEDURE — 99214 OFFICE O/P EST MOD 30 MIN: CPT

## 2019-05-06 ENCOUNTER — APPOINTMENT (OUTPATIENT)
Dept: GASTROENTEROLOGY | Facility: CLINIC | Age: 74
End: 2019-05-06
Payer: MEDICARE

## 2019-05-06 VITALS
DIASTOLIC BLOOD PRESSURE: 70 MMHG | HEIGHT: 60 IN | BODY MASS INDEX: 35.93 KG/M2 | HEART RATE: 68 BPM | SYSTOLIC BLOOD PRESSURE: 132 MMHG | WEIGHT: 183 LBS

## 2019-05-06 PROCEDURE — 99214 OFFICE O/P EST MOD 30 MIN: CPT

## 2019-05-06 NOTE — PHYSICAL EXAM
[General Appearance - Alert] : alert [General Appearance - In No Acute Distress] : in no acute distress [General Appearance - Well Nourished] : well nourished [General Appearance - Well Developed] : well developed [Sclera] : the sclera and conjunctiva were normal [Outer Ear] : the ears and nose were normal in appearance [Hearing Threshold Finger Rub Not [place]] : hearing was normal [Both Tympanic Membranes Were Examined] : both tympanic membranes were normal [Neck Appearance] : the appearance of the neck was normal [Neck Cervical Mass (___cm)] : no neck mass was observed [Respiration, Rhythm And Depth] : normal respiratory rhythm and effort [Exaggerated Use Of Accessory Muscles For Inspiration] : no accessory muscle use [Auscultation Breath Sounds / Voice Sounds] : lungs were clear to auscultation bilaterally [Apical Impulse] : the apical impulse was normal [Heart Rate And Rhythm] : heart rate was normal and rhythm regular [Heart Sounds] : normal S1 and S2 [Bowel Sounds] : normal bowel sounds [Abdomen Soft] : soft [Abdomen Tenderness] : non-tender [Skin Color & Pigmentation] : normal skin color and pigmentation [Skin Turgor] : normal skin turgor [] : no rash [Impaired Insight] : insight and judgment were intact [Oriented To Time, Place, And Person] : oriented to person, place, and time [Affect] : the affect was normal

## 2019-05-06 NOTE — HISTORY OF PRESENT ILLNESS
[de-identified] : The patient is here with new-onset diarrhea. He has been vomiting diarrhea everyday for the past 2 months. Diarrhea is moderate to severe. Stool was described as watery and occurs 5-8 times a day. She has severe urgency prior to bowel movement. Mild cramping before bowel movement. No rectal bleeding. Diarrhea awakened in the middle of the night. She has had no incontinence. No fevers. No weight loss. She has no new medications. No recent antibiotics. No travel history. Diarrhea has slightly improved in the last 2 days. She has no nausea or vomiting. She does tell me that her  is severely ill and has been in and out of the hospital 5 times in the past 3 months. She spent a lot of time in hospitals. Normally the patient is constipated and has required MiraLax and meningitis in the past. Her last colonoscopy in 2018 showed left-sided diverticulosis and hemorrhoids.\par     The patient has known history of cirrhosis. She was drinking about 8 ounces of alcohol a day for years. Now she has one to 2 glasses of wine a week. She was admitted about 2 years ago with drug-induced liver disease and Lipitor. Currently her liver function tests and monitor are normal. I reviewed the notes from hepatology. No fibrosis scan is  F0/F1. Her AFP was normal, liver function tests normal, PT normal in April. Her CBC was normal with the exception of MCV which is 103. She had an MRI in December which showed some hepatic cysts but no hepatoma.

## 2019-05-06 NOTE — ASSESSMENT
[FreeTextEntry1] : A/P\par diarrhea\par stool studies ordered\par cbc, cmp , esr, crp, celiac panel, tsh\par F/U in 2 months\par low fiber diet\par peptobismol prn

## 2019-05-08 LAB
ALBUMIN SERPL ELPH-MCNC: 4.1 G/DL
ALP BLD-CCNC: 73 U/L
ALT SERPL-CCNC: 15 U/L
ANION GAP SERPL CALC-SCNC: 15 MMOL/L
AST SERPL-CCNC: 20 U/L
BILIRUB SERPL-MCNC: 0.7 MG/DL
BUN SERPL-MCNC: 13 MG/DL
CALCIUM SERPL-MCNC: 9.6 MG/DL
CHLORIDE SERPL-SCNC: 109 MMOL/L
CO2 SERPL-SCNC: 20 MMOL/L
CREAT SERPL-MCNC: 0.64 MG/DL
CRP SERPL-MCNC: 0.29 MG/DL
ENDOMYSIUM IGA SER QL: NEGATIVE
ENDOMYSIUM IGA TITR SER: NORMAL
ERYTHROCYTE [SEDIMENTATION RATE] IN BLOOD BY WESTERGREN METHOD: 31 MM/HR
GLIADIN IGA SER QL: 5.6 UNITS
GLIADIN IGG SER QL: <5 UNITS
GLIADIN PEPTIDE IGA SER-ACNC: NEGATIVE
GLIADIN PEPTIDE IGG SER-ACNC: NEGATIVE
GLUCOSE SERPL-MCNC: 94 MG/DL
IGA SER QL IEP: 190 MG/DL
POTASSIUM SERPL-SCNC: 4.3 MMOL/L
PROT SERPL-MCNC: 6.6 G/DL
SODIUM SERPL-SCNC: 144 MMOL/L
TSH SERPL-ACNC: 2.04 UIU/ML
TTG IGA SER IA-ACNC: <1.2 U/ML
TTG IGA SER-ACNC: NEGATIVE
TTG IGG SER IA-ACNC: 1.2 U/ML
TTG IGG SER IA-ACNC: NEGATIVE

## 2019-05-09 LAB — G LAMBLIA AG STL QL: NORMAL

## 2019-05-10 LAB — DEPRECATED O AND P PREP STL: NORMAL

## 2019-05-13 LAB — BACTERIA STL CULT: NORMAL

## 2019-05-30 ENCOUNTER — OTHER (OUTPATIENT)
Age: 74
End: 2019-05-30

## 2019-05-31 ENCOUNTER — OTHER (OUTPATIENT)
Age: 74
End: 2019-05-31

## 2019-06-05 ENCOUNTER — RESULT REVIEW (OUTPATIENT)
Age: 74
End: 2019-06-05

## 2019-06-05 LAB
C DIFF TOX GENS STL QL NAA+PROBE: NORMAL
C DIFF TOX GENS STL QL NAA+PROBE: NORMAL
CDIFF BY PCR: NOT DETECTED
CDIFF BY PCR: NOT DETECTED

## 2019-06-21 ENCOUNTER — RX RENEWAL (OUTPATIENT)
Age: 74
End: 2019-06-21

## 2019-07-25 ENCOUNTER — APPOINTMENT (OUTPATIENT)
Dept: CARDIOLOGY | Facility: CLINIC | Age: 74
End: 2019-07-25
Payer: MEDICARE

## 2019-07-25 ENCOUNTER — NON-APPOINTMENT (OUTPATIENT)
Age: 74
End: 2019-07-25

## 2019-07-25 VITALS
BODY MASS INDEX: 35.14 KG/M2 | WEIGHT: 179 LBS | HEART RATE: 70 BPM | DIASTOLIC BLOOD PRESSURE: 70 MMHG | OXYGEN SATURATION: 96 % | HEIGHT: 60 IN | SYSTOLIC BLOOD PRESSURE: 126 MMHG

## 2019-07-25 DIAGNOSIS — I35.1 NONRHEUMATIC AORTIC (VALVE) INSUFFICIENCY: ICD-10-CM

## 2019-07-25 PROCEDURE — 78452 HT MUSCLE IMAGE SPECT MULT: CPT

## 2019-07-25 PROCEDURE — 99214 OFFICE O/P EST MOD 30 MIN: CPT

## 2019-07-25 PROCEDURE — 93000 ELECTROCARDIOGRAM COMPLETE: CPT

## 2019-07-25 PROCEDURE — A9500: CPT

## 2019-07-25 PROCEDURE — 93015 CV STRESS TEST SUPVJ I&R: CPT

## 2019-07-25 RX ORDER — NITROFURANTOIN (MONOHYDRATE/MACROCRYSTALS) 25; 75 MG/1; MG/1
100 CAPSULE ORAL
Qty: 14 | Refills: 0 | Status: DISCONTINUED | COMMUNITY
Start: 2018-12-27 | End: 2019-07-25

## 2019-07-25 RX ORDER — CIPROFLOXACIN HYDROCHLORIDE 500 MG/1
500 TABLET, FILM COATED ORAL
Qty: 14 | Refills: 0 | Status: DISCONTINUED | COMMUNITY
Start: 2018-12-31 | End: 2019-07-25

## 2019-07-25 NOTE — ASSESSMENT
[FreeTextEntry1] : 1. No EKG changes or symptoms\par 2. Nuclear stress test ordered.\par 3. low salt diet\par 4. PAF, on asa, She is aware of symptoms, will go to ED if she has palpitations. We spoke about increased risk of stroke off AC\par FU in 6 months\par patient was advised to call 911 and go to the nearest emergency room with new symptoms of cp or sob\par

## 2019-07-25 NOTE — HISTORY OF PRESENT ILLNESS
[FreeTextEntry1] : 74 yo female seen with palpitations, PAF, AI\par \par She walks about 2 miles per day, limitation is knee pain, she has no cp with activity. Breathing is unchanged. no syncope or lightheadedness. no new neurologic symptoms \par  taking asa daily

## 2019-07-25 NOTE — PHYSICAL EXAM
[Normal Appearance] : normal appearance [Normal Conjunctiva] : the conjunctiva exhibited no abnormalities [Normal Oral Mucosa] : normal oral mucosa [No Oral Pallor] : no oral pallor [Normal Oropharynx] : normal oropharynx [Normal Jugular Venous A Waves Present] : normal jugular venous A waves present [Normal Jugular Venous V Waves Present] : normal jugular venous V waves present [Respiration, Rhythm And Depth] : normal respiratory rhythm and effort [Auscultation Breath Sounds / Voice Sounds] : lungs were clear to auscultation bilaterally [Chest Palpation] : palpation of the chest revealed no abnormalities [Heart Rate And Rhythm] : heart rate and rhythm were normal [Heart Sounds] : normal S1 and S2 [Arterial Pulses Normal] : the arterial pulses were normal [Edema] : no peripheral edema present [FreeTextEntry1] : 3/6 TORY DEXTERB [Bowel Sounds] : normal bowel sounds [Abdomen Soft] : soft [Abdomen Tenderness] : non-tender [Abnormal Walk] : normal gait [Nail Clubbing] : no clubbing of the fingernails [Cyanosis, Localized] : no localized cyanosis [Petechial Hemorrhages (___cm)] : no petechial hemorrhages [Skin Turgor] : normal skin turgor [] : no rash [No Venous Stasis] : no venous stasis [Oriented To Time, Place, And Person] : oriented to person, place, and time [Impaired Insight] : insight and judgment were intact [Affect] : the affect was normal

## 2019-08-29 NOTE — ED ADULT NURSE NOTE - NEURO ASSESSMENT
Spoke with Neeta Rodriguez in pathology at [address] [place] [address]. She states that they have until 8/28/19 to complete the autopsy. Jareth Cuff states a full autopsy was done, there were no restictions. She is not sure why genetic testing was not done.   She i - - -

## 2019-09-17 ENCOUNTER — APPOINTMENT (OUTPATIENT)
Dept: INTERNAL MEDICINE | Facility: CLINIC | Age: 74
End: 2019-09-17
Payer: MEDICARE

## 2019-09-17 ENCOUNTER — LABORATORY RESULT (OUTPATIENT)
Age: 74
End: 2019-09-17

## 2019-09-17 VITALS
DIASTOLIC BLOOD PRESSURE: 80 MMHG | SYSTOLIC BLOOD PRESSURE: 120 MMHG | BODY MASS INDEX: 35.14 KG/M2 | WEIGHT: 179 LBS | HEIGHT: 60 IN | HEART RATE: 70 BPM | OXYGEN SATURATION: 96 %

## 2019-09-17 PROCEDURE — 36415 COLL VENOUS BLD VENIPUNCTURE: CPT

## 2019-09-17 PROCEDURE — 99213 OFFICE O/P EST LOW 20 MIN: CPT | Mod: 25

## 2019-09-17 PROCEDURE — 90732 PPSV23 VACC 2 YRS+ SUBQ/IM: CPT

## 2019-09-17 PROCEDURE — G0009: CPT

## 2019-09-18 ENCOUNTER — RESULT REVIEW (OUTPATIENT)
Age: 74
End: 2019-09-18

## 2019-09-18 LAB
ALBUMIN SERPL ELPH-MCNC: 4.3 G/DL
ALP BLD-CCNC: 67 U/L
ALT SERPL-CCNC: 17 U/L
ANION GAP SERPL CALC-SCNC: 14 MMOL/L
AST SERPL-CCNC: 19 U/L
BASOPHILS # BLD AUTO: 0.07 K/UL
BASOPHILS NFR BLD AUTO: 1.1 %
BILIRUB SERPL-MCNC: 0.5 MG/DL
BUN SERPL-MCNC: 17 MG/DL
CALCIUM SERPL-MCNC: 9.7 MG/DL
CHLORIDE SERPL-SCNC: 104 MMOL/L
CHOLEST SERPL-MCNC: 215 MG/DL
CHOLEST/HDLC SERPL: 3.2 RATIO
CO2 SERPL-SCNC: 23 MMOL/L
CREAT SERPL-MCNC: 0.8 MG/DL
EOSINOPHIL # BLD AUTO: 0.33 K/UL
EOSINOPHIL NFR BLD AUTO: 5.2 %
ESTIMATED AVERAGE GLUCOSE: 117 MG/DL
FOLATE SERPL-MCNC: 15.2 NG/ML
GLUCOSE SERPL-MCNC: 103 MG/DL
HBA1C MFR BLD HPLC: 5.7 %
HCT VFR BLD CALC: 41.4 %
HDLC SERPL-MCNC: 68 MG/DL
HGB BLD-MCNC: 13.1 G/DL
IMM GRANULOCYTES NFR BLD AUTO: 0.3 %
LDLC SERPL CALC-MCNC: 121 MG/DL
LYMPHOCYTES # BLD AUTO: 1.99 K/UL
LYMPHOCYTES NFR BLD AUTO: 31.1 %
MAN DIFF?: NORMAL
MCHC RBC-ENTMCNC: 31.6 GM/DL
MCHC RBC-ENTMCNC: 33.8 PG
MCV RBC AUTO: 106.7 FL
MONOCYTES # BLD AUTO: 0.62 K/UL
MONOCYTES NFR BLD AUTO: 9.7 %
NEUTROPHILS # BLD AUTO: 3.36 K/UL
NEUTROPHILS NFR BLD AUTO: 52.6 %
PLATELET # BLD AUTO: 378 K/UL
POTASSIUM SERPL-SCNC: 4.4 MMOL/L
PROT SERPL-MCNC: 6.7 G/DL
RBC # BLD: 3.88 M/UL
RBC # FLD: 13.9 %
SODIUM SERPL-SCNC: 141 MMOL/L
TRIGL SERPL-MCNC: 128 MG/DL
TSH SERPL-ACNC: 3.32 UIU/ML
VIT B12 SERPL-MCNC: 483 PG/ML
WBC # FLD AUTO: 6.39 K/UL

## 2019-09-18 NOTE — HISTORY OF PRESENT ILLNESS
[FreeTextEntry1] : On anxiety/obesity/PAF [de-identified] : Patient presents for followup on anxiety/obesity/PAF. Patient is currently fasting for today's labs/no complaints. Patient is currently on Zoloft for anxiety, on aspirin for PAF and has not made any lifestyle changes as of yet to improve her obesity. \par \par

## 2019-09-18 NOTE — ADDENDUM
[FreeTextEntry1] : Labs show\par -CBC with macrocytosis, B12 and folate are normal, check CBC next\par

## 2019-09-18 NOTE — HEALTH RISK ASSESSMENT
[Fully functional (bathing, dressing, toileting, transferring, walking, feeding)] : Fully functional (bathing, dressing, toileting, transferring, walking, feeding) [Fully functional (using the telephone, shopping, preparing meals, housekeeping, doing laundry, using] : Fully functional and needs no help or supervision to perform IADLs (using the telephone, shopping, preparing meals, housekeeping, doing laundry, using transportation, managing medications and managing finances) [Yes] : Yes [4 or more  times a week (4 pts)] : 4 or more  times a week (4 points) [1 or 2 (0 pts)] : 1 or 2 (0 points) [Never (0 pts)] : Never (0 points) [No falls in past year] : Patient reported no falls in the past year [No] : In the past 12 months have you used drugs other than those required for medical reasons? No [0] : 2) Feeling down, depressed, or hopeless: Not at all (0) [Audit-CScore] : 4 points

## 2019-09-18 NOTE — ASSESSMENT
[FreeTextEntry1] : Labs sent out and further recommendations will be made based on lab results.Pneumococcal vaccine given without reaction, already received flu vaccine. Patient advised to continue present medications with diet/exercise and specialists followup.RTO in 3-4months for CP\par \par Discussed shingles vaccine\par Bone density-UTD=1/18\par Mvqqyfxjh-CVX-6/19\par Colonoscopy 2/18\par Endoscopy 1/18\par Specialists include\par 1. GI-Dr. Lang\par 2. Cardiology-Dr. Brooke\par 3. GYN-Dr. Zuñiga\par 4. Ophthalmology-Dr. Eric anderson\par 5. Orthopedic p.r.n.-Dr. Read/Dr. Thompson\par 6. Neurology-Dr. Ashraf\par 7. Vascular-Dr. David=overdue "to do"\par 8. Hepatologist- \par Carotid sonogram via vascular 6/2018-to sched vascular followup\par Hepatitis C screening in the past was negative, declines HIV screening\par CT lung=N/A

## 2019-09-20 ENCOUNTER — APPOINTMENT (OUTPATIENT)
Dept: HEPATOLOGY | Facility: CLINIC | Age: 74
End: 2019-09-20
Payer: MEDICARE

## 2019-09-20 VITALS
TEMPERATURE: 97.9 F | RESPIRATION RATE: 19 BRPM | SYSTOLIC BLOOD PRESSURE: 143 MMHG | BODY MASS INDEX: 35.14 KG/M2 | HEART RATE: 71 BPM | WEIGHT: 179 LBS | HEIGHT: 60 IN | DIASTOLIC BLOOD PRESSURE: 79 MMHG

## 2019-09-20 PROCEDURE — 99214 OFFICE O/P EST MOD 30 MIN: CPT

## 2019-09-23 ENCOUNTER — RX RENEWAL (OUTPATIENT)
Age: 74
End: 2019-09-23

## 2019-10-14 ENCOUNTER — APPOINTMENT (OUTPATIENT)
Dept: VASCULAR SURGERY | Facility: CLINIC | Age: 74
End: 2019-10-14
Payer: MEDICARE

## 2019-10-14 VITALS
HEART RATE: 86 BPM | SYSTOLIC BLOOD PRESSURE: 112 MMHG | BODY MASS INDEX: 35.73 KG/M2 | OXYGEN SATURATION: 97 % | HEIGHT: 60 IN | TEMPERATURE: 98.2 F | DIASTOLIC BLOOD PRESSURE: 75 MMHG | WEIGHT: 182 LBS

## 2019-10-14 PROCEDURE — 99214 OFFICE O/P EST MOD 30 MIN: CPT

## 2019-10-14 PROCEDURE — 93880 EXTRACRANIAL BILAT STUDY: CPT

## 2019-11-01 ENCOUNTER — APPOINTMENT (OUTPATIENT)
Dept: HEPATOLOGY | Facility: CLINIC | Age: 74
End: 2019-11-01

## 2020-01-27 ENCOUNTER — TRANSCRIPTION ENCOUNTER (OUTPATIENT)
Age: 75
End: 2020-01-27

## 2020-02-13 ENCOUNTER — APPOINTMENT (OUTPATIENT)
Dept: INTERNAL MEDICINE | Facility: CLINIC | Age: 75
End: 2020-02-13

## 2020-02-19 ENCOUNTER — APPOINTMENT (OUTPATIENT)
Dept: DERMATOLOGY | Facility: CLINIC | Age: 75
End: 2020-02-19

## 2020-02-26 ENCOUNTER — APPOINTMENT (OUTPATIENT)
Dept: DERMATOLOGY | Facility: CLINIC | Age: 75
End: 2020-02-26

## 2020-03-02 ENCOUNTER — APPOINTMENT (OUTPATIENT)
Dept: GASTROENTEROLOGY | Facility: CLINIC | Age: 75
End: 2020-03-02
Payer: MEDICARE

## 2020-03-02 VITALS
HEART RATE: 73 BPM | BODY MASS INDEX: 36.32 KG/M2 | WEIGHT: 185 LBS | OXYGEN SATURATION: 97 % | HEIGHT: 60 IN | SYSTOLIC BLOOD PRESSURE: 132 MMHG | DIASTOLIC BLOOD PRESSURE: 82 MMHG

## 2020-03-02 PROCEDURE — 99214 OFFICE O/P EST MOD 30 MIN: CPT

## 2020-03-02 NOTE — PHYSICAL EXAM
[General Appearance - Alert] : alert [General Appearance - Well Developed] : well developed [General Appearance - In No Acute Distress] : in no acute distress [General Appearance - Well Nourished] : well nourished [Outer Ear] : the ears and nose were normal in appearance [Sclera] : the sclera and conjunctiva were normal [Neck Appearance] : the appearance of the neck was normal [] : the neck was supple [Neck Cervical Mass (___cm)] : no neck mass was observed [Auscultation Breath Sounds / Voice Sounds] : lungs were clear to auscultation bilaterally [Exaggerated Use Of Accessory Muscles For Inspiration] : no accessory muscle use [Respiration, Rhythm And Depth] : normal respiratory rhythm and effort [Apical Impulse] : the apical impulse was normal [Heart Rate And Rhythm] : heart rate was normal and rhythm regular [Heart Sounds] : normal S1 and S2 [Bowel Sounds] : normal bowel sounds [Abdomen Soft] : soft [Abdomen Tenderness] : non-tender [Femoral Lymph Nodes Enlarged Bilaterally] : femoral [Skin Color & Pigmentation] : normal skin color and pigmentation [Oriented To Time, Place, And Person] : oriented to person, place, and time [Impaired Insight] : insight and judgment were intact [Affect] : the affect was normal

## 2020-03-04 LAB
ALBUMIN SERPL ELPH-MCNC: 4.1 G/DL
ALP BLD-CCNC: 75 U/L
ALT SERPL-CCNC: 16 U/L
ANION GAP SERPL CALC-SCNC: 15 MMOL/L
AST SERPL-CCNC: 18 U/L
BILIRUB SERPL-MCNC: 0.5 MG/DL
BUN SERPL-MCNC: 11 MG/DL
CALCIUM SERPL-MCNC: 9.4 MG/DL
CHLORIDE SERPL-SCNC: 103 MMOL/L
CO2 SERPL-SCNC: 25 MMOL/L
CREAT SERPL-MCNC: 0.66 MG/DL
CRP SERPL-MCNC: 0.36 MG/DL
ERYTHROCYTE [SEDIMENTATION RATE] IN BLOOD BY WESTERGREN METHOD: 32 MM/HR
GLUCOSE SERPL-MCNC: 86 MG/DL
POTASSIUM SERPL-SCNC: 4.5 MMOL/L
PROT SERPL-MCNC: 6.7 G/DL
SODIUM SERPL-SCNC: 142 MMOL/L

## 2020-03-04 NOTE — HISTORY OF PRESENT ILLNESS
[de-identified] :  Patient with hx of cardiac ablation, afib, chronic ETOH, DILI. \par      Patient seen in may 2019 with diarrhea.  Moderate to severe, urgency cramping, woke her up at night. Stool studies were negative.   TSH And celiac panel normal.  COlonoscopy in 2018 showed diverticulosis and internal hemorrhoids. DId not follow up.  DIarrhea improved. The diarrhea began again 4-6 weeks ago. Went to Dr Berrios ( GI ) last month.  Stools are watery, severe, wakes her at night, no rectal bleeding. Mild cramping. + weight gain- bloating. NO fevers, no nausea, no vomiting. Stool studies negative for cultures, Cdiff, . Calprotectin was normal, Elastase was decreased. Started Creon last week with minimal improvement of diarrhea.  KUB  showed non specific gas pattern ( I reviewed the reoport from [facility]). IN Sept tsh normal, last june celiac was normal. \par     Pt follows Dr Jain from hepatology hx of ETOH abuse in past, DILI.

## 2020-03-04 NOTE — ASSESSMENT
[FreeTextEntry1] : A/P\par - recheck cbc, cmp esr, crp\par  Patient with diarrhea. Stool studies negative. May have some degree of pancreatic insuffiency with low elastase. On creon. \par - Will repeat colonoscopy- will try to go to TI - r/o IBD and take random biopsies to r/o microscopic colitis\par - low fiber diet\par - pt will call us when she get the date for cardiac clearance appointment and then give colon date ( miralax prep) when we know cardiac clearance date ( do not have to have the cardiac note before booking)\par - hx of ETOH abuse ( decreased now)\par - will gt u/s to R/O ascites- though clinically does not appear to have ascites\par - 3/4 addendum-  pt was cleared by Dr Bryant. See tasks.  Will schedule colon with miralax prep

## 2020-03-05 ENCOUNTER — RX RENEWAL (OUTPATIENT)
Age: 75
End: 2020-03-05

## 2020-03-06 ENCOUNTER — APPOINTMENT (OUTPATIENT)
Dept: GASTROENTEROLOGY | Facility: GI CENTER | Age: 75
End: 2020-03-06
Payer: MEDICARE

## 2020-03-06 ENCOUNTER — RESULT REVIEW (OUTPATIENT)
Age: 75
End: 2020-03-06

## 2020-03-06 ENCOUNTER — OUTPATIENT (OUTPATIENT)
Dept: OUTPATIENT SERVICES | Facility: HOSPITAL | Age: 75
LOS: 1 days | End: 2020-03-06
Payer: MEDICARE

## 2020-03-06 DIAGNOSIS — Z98.89 OTHER SPECIFIED POSTPROCEDURAL STATES: Chronic | ICD-10-CM

## 2020-03-06 DIAGNOSIS — K59.1 FUNCTIONAL DIARRHEA: ICD-10-CM

## 2020-03-06 DIAGNOSIS — H26.40 UNSPECIFIED SECONDARY CATARACT: Chronic | ICD-10-CM

## 2020-03-06 PROCEDURE — 88305 TISSUE EXAM BY PATHOLOGIST: CPT | Mod: 26

## 2020-03-06 PROCEDURE — 88305 TISSUE EXAM BY PATHOLOGIST: CPT

## 2020-03-06 PROCEDURE — 45380 COLONOSCOPY AND BIOPSY: CPT

## 2020-03-06 NOTE — PROCEDURE
[With Biopsy] : with biopsy [Chronic Diarrhea] : chronic diarrhea [Procedure Explained] : The procedure was explained [Allergies Reviewed] : allergies reviewed. [Risks] : Risks [Benefits] : benefits [Alternatives] : alternatives [Bleeding] : bleeding risk [Infection] : risk of infection [Consent Obtained] : written consent was obtained prior to the procedure and is detailed in the patient's record [Patient] : the patient [Bowel Prep Kit] : the patient took the appropriate bowel preparation kit as directed [Approved Diet Followed] : the patient avoided solid foods and adhered to the approved diet list for 24 hours prior to the procedure [Automated Blood Pressure Cuff] : automated blood pressure cuff [Cardiac Monitor] : cardiac monitor [Pulse Oximeter] : pulse oximeter [3] : 3 [Prep Qualtiy: ___] : Prep Quality:  [unfilled] [Withdrawal Time: ___] : Withdrawal Time:  [unfilled] [Performed By: ___] : Performed by:  YEIMI [Left Lateral Decubitus] : The patient was positioned in the left lateral decubitus position [Cecum (Landmarks/Transillum)] : and guided to the cecum which was identified by the anatomic landmarks of the appendiceal orifice and ileocecal valve and by transillumination in the right lower quadrant [Insufflated] : insufflated [Single Pass Needed] : after a single pass [Retroflex View] : a retroflex view of the rectum was performed [Normal] : Normal [Diverticulosis] : diverticulosis [Hemorrhoids] : hemorrhoids [Sent to Pathology] : was sent to pathology for analysis [Tolerated Well] : the patient tolerated the procedure well [Vital Signs Stable] : the vital signs were stable [No Complications] : There were no complications [Abnormal Rectum] : a normal rectum [External Hemorrhoids] : no external hemorrhoids [Patient Rotated Into Alternating Positions] : the patient was not rotated [de-identified] : diarrhea [de-identified] : Unable to get into TI.  [de-identified] : Random biopsies of ascending, transverse, descending, sigmoid and rectum [de-identified] :  There was thick liquid stool in sigmoid. Resolved with suction

## 2020-03-06 NOTE — REASON FOR VISIT
[Follow-Up: _____] : a [unfilled] follow-up visit [FreeTextEntry1] : diarrhea [Colonoscopy] : a colonoscopy [FreeTextEntry2] : diarrhea

## 2020-03-06 NOTE — ASSESSMENT
[FreeTextEntry1] : A/P\par  Diarrhea- diverticulosis, hemorrhoids\par - call in one week for random biopsy results\par F/U in one month\par if Bx negative, then start lomotil , creon and consider capsule endo vs CTE\par No need for routine screening colonoscopy after age 75

## 2020-03-06 NOTE — PROCEDURE
[With Biopsy] : with biopsy [Chronic Diarrhea] : chronic diarrhea [Procedure Explained] : The procedure was explained [Allergies Reviewed] : allergies reviewed. [Risks] : Risks [Benefits] : benefits [Alternatives] : alternatives [Bleeding] : bleeding risk [Infection] : risk of infection [Consent Obtained] : written consent was obtained prior to the procedure and is detailed in the patient's record [Patient] : the patient [Bowel Prep Kit] : the patient took the appropriate bowel preparation kit as directed [Approved Diet Followed] : the patient avoided solid foods and adhered to the approved diet list for 24 hours prior to the procedure [Automated Blood Pressure Cuff] : automated blood pressure cuff [Cardiac Monitor] : cardiac monitor [Pulse Oximeter] : pulse oximeter [3] : 3 [Prep Qualtiy: ___] : Prep Quality:  [unfilled] [Withdrawal Time: ___] : Withdrawal Time:  [unfilled] [Performed By: ___] : Performed by:  YEIMI [Left Lateral Decubitus] : The patient was positioned in the left lateral decubitus position [Cecum (Landmarks/Transillum)] : and guided to the cecum which was identified by the anatomic landmarks of the appendiceal orifice and ileocecal valve and by transillumination in the right lower quadrant [Insufflated] : insufflated [Single Pass Needed] : after a single pass [Retroflex View] : a retroflex view of the rectum was performed [Normal] : Normal [Diverticulosis] : diverticulosis [Hemorrhoids] : hemorrhoids [Sent to Pathology] : was sent to pathology for analysis [Tolerated Well] : the patient tolerated the procedure well [Vital Signs Stable] : the vital signs were stable [No Complications] : There were no complications [Abnormal Rectum] : a normal rectum [External Hemorrhoids] : no external hemorrhoids [Patient Rotated Into Alternating Positions] : the patient was not rotated [de-identified] : diarrhea [de-identified] : Unable to get into TI.  [de-identified] : Random biopsies of ascending, transverse, descending, sigmoid and rectum [de-identified] :  There was thick liquid stool in sigmoid. Resolved with suction

## 2020-03-08 ENCOUNTER — FORM ENCOUNTER (OUTPATIENT)
Age: 75
End: 2020-03-08

## 2020-03-09 ENCOUNTER — APPOINTMENT (OUTPATIENT)
Dept: ULTRASOUND IMAGING | Facility: CLINIC | Age: 75
End: 2020-03-09
Payer: MEDICARE

## 2020-03-09 ENCOUNTER — OUTPATIENT (OUTPATIENT)
Dept: OUTPATIENT SERVICES | Facility: HOSPITAL | Age: 75
LOS: 1 days | End: 2020-03-09
Payer: MEDICARE

## 2020-03-09 DIAGNOSIS — Z98.89 OTHER SPECIFIED POSTPROCEDURAL STATES: Chronic | ICD-10-CM

## 2020-03-09 DIAGNOSIS — H26.40 UNSPECIFIED SECONDARY CATARACT: Chronic | ICD-10-CM

## 2020-03-09 DIAGNOSIS — Z00.00 ENCOUNTER FOR GENERAL ADULT MEDICAL EXAMINATION WITHOUT ABNORMAL FINDINGS: ICD-10-CM

## 2020-03-09 PROCEDURE — 76700 US EXAM ABDOM COMPLETE: CPT

## 2020-03-09 PROCEDURE — 76700 US EXAM ABDOM COMPLETE: CPT | Mod: 26

## 2020-03-10 ENCOUNTER — APPOINTMENT (OUTPATIENT)
Dept: INTERNAL MEDICINE | Facility: CLINIC | Age: 75
End: 2020-03-10
Payer: MEDICARE

## 2020-03-10 VITALS
BODY MASS INDEX: 36.71 KG/M2 | HEART RATE: 64 BPM | RESPIRATION RATE: 16 BRPM | HEIGHT: 60 IN | WEIGHT: 187 LBS | SYSTOLIC BLOOD PRESSURE: 120 MMHG | DIASTOLIC BLOOD PRESSURE: 80 MMHG

## 2020-03-10 PROCEDURE — G0439: CPT

## 2020-03-10 PROCEDURE — 36415 COLL VENOUS BLD VENIPUNCTURE: CPT

## 2020-03-10 PROCEDURE — G0442 ANNUAL ALCOHOL SCREEN 15 MIN: CPT | Mod: 59

## 2020-03-10 NOTE — HEALTH RISK ASSESSMENT
[Good] : ~his/her~ current health as good [Fair] :  ~his/her~ mood as fair [0] : 2) Feeling down, depressed, or hopeless: Not at all (0) [None] : None [With Significant Other] : lives with significant other [# of Members in Household ___] :  household currently consist of [unfilled] member(s) [Employed] : employed [High School] : high school [] :  [# Of Children ___] : has [unfilled] children [Sexually Active] : sexually active [Feels Safe at Home] : Feels safe at home [Fully functional (bathing, dressing, toileting, transferring, walking, feeding)] : Fully functional (bathing, dressing, toileting, transferring, walking, feeding) [Fully functional (using the telephone, shopping, preparing meals, housekeeping, doing laundry, using] : Fully functional and needs no help or supervision to perform IADLs (using the telephone, shopping, preparing meals, housekeeping, doing laundry, using transportation, managing medications and managing finances) [Reports changes in hearing] : Reports changes in hearing [Smoke Detector] : smoke detector [Carbon Monoxide Detector] : carbon monoxide detector [Seat Belt] :  uses seat belt [Sunscreen] : uses sunscreen [Discussed at today's visit] : Advance Directives Discussed at today's visit [Yes] : Yes [No] : In the past 12 months have you used drugs other than those required for medical reasons? 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

## 2020-03-10 NOTE — COUNSELING
Been sick 8 days   Runny nose and sore throat   Now they started coughing up green congestion   [facility]  RX# [phone] [Healthy eating counseling provided] : healthy eating [Low Fat Diet] : Low fat diet [Decrease Portions] : Decrease food portions [Walking] : Walking [None] : None [Needs reinforcement, provided] : Patient needs reinforcement on understanding lifestyle changes and  the steps needed to achieve self management goals and reinforcement was provided [de-identified] : The patient again was told she should totally abstain from alcohol

## 2020-03-10 NOTE — ASSESSMENT
[FreeTextEntry1] : 74-year-old female with good general health with continued need for lifestyle changes with diet exercise and weight loss.\par A. fib with status post ablation and clinically remains in sinus rhythm.\par \par Patient status post acute hepatitis secondary to drug-induced liver injury likely secondary to Lipitor with return to normal liver functions.\par \par Workup showed the patient to have liver cirrhosis likely secondary to chronic alcohol intake which was daily. The patient is now totally abstaining from alcohol and again strongly to continue\par \par Patient now with persistent diarrhea of questionable etiology therefore followup with gastroenterology as scheduled\par \par Carotid stenosis stable and on Crestor\par \par Patient with chronic anxiety and depression on Zoloft to 75 mg daily\par \par Followup with cardiology and hepatology as scheduled\par \par Bone density January 2018\par Mammography January 2020\par Colonoscopy March 2020\par Endoscopy January 2018\par GYN yearly\par \par Vaccines up to date (including hepatitis A and hepatitis B) other than shingles vaccine.\par \par Venipuncture done today in the office\par \par Followup in 6 months\par \par

## 2020-03-10 NOTE — HISTORY OF PRESENT ILLNESS
[FreeTextEntry1] : 74-year-old female presents for her yearly physical with history of atrial fibrillation for which she had ablation about 5 years ago with cardiology followup and remains in sinus rhythm. For this she continues on an aspirin daily.\par \par The patient's significant issue this past year is December 2017 she was admitted and found to have acute hepatitis felt secondary to drug-induced liver injury secondary to Lipitor.\par Evaluation was done including MRIs which showed cirrhosis which was felt to be due to patient's chronic alcohol intake.\par Patient's liver functions have gone back to normal.\par She is following with hepatologist\par The patient had continued to drink occasional alcohol but states she has not drank alcohol for about 3 months.\par \par Patient with moderate right carotid stenosis which is monitored by vascular and cardiology.\par \par Patient with hyperlipidemia on Crestor.\par \par She did have a right total knee replacement October 2014 which went fairly well but still has occasional discomfort with a known Baker's cyst.\par \par Otherwise she is feeling relatively well without chest pain, palpitations or shortness of breath.\par Her weight is about the same without any regular exercise regimen.\par \par The patient's main issue is that she has had significant diarrhea for about 2 months with etiology thus far unclear.\par She saw one gastroenterologist with testing showing low pancreatic enzyme therefore possibility of pancreatic insufficiency therefore patient is on Creon with questionable benefit.\par She does state her her diarrhea is less but continues.\par She had a colonoscopy last week the results of which are pending.\par \par The patient has anxiety and depression and is on Zoloft 50 mg  1-1/2 tabs daily.

## 2020-03-11 LAB
25(OH)D3 SERPL-MCNC: 29.9 NG/ML
ALBUMIN SERPL ELPH-MCNC: 4.4 G/DL
ALP BLD-CCNC: 75 U/L
ALT SERPL-CCNC: 18 U/L
ANION GAP SERPL CALC-SCNC: 13 MMOL/L
APPEARANCE: CLEAR
AST SERPL-CCNC: 19 U/L
BACTERIA: NEGATIVE
BASOPHILS # BLD AUTO: 0.06 K/UL
BASOPHILS NFR BLD AUTO: 0.8 %
BILIRUB SERPL-MCNC: 0.6 MG/DL
BILIRUBIN URINE: NEGATIVE
BLOOD URINE: NEGATIVE
BUN SERPL-MCNC: 11 MG/DL
CALCIUM SERPL-MCNC: 9.7 MG/DL
CHLORIDE SERPL-SCNC: 104 MMOL/L
CHOLEST SERPL-MCNC: 339 MG/DL
CHOLEST/HDLC SERPL: 6.1 RATIO
CO2 SERPL-SCNC: 23 MMOL/L
COLOR: YELLOW
CREAT SERPL-MCNC: 0.63 MG/DL
EOSINOPHIL # BLD AUTO: 0.15 K/UL
EOSINOPHIL NFR BLD AUTO: 1.9 %
ESTIMATED AVERAGE GLUCOSE: 117 MG/DL
GLUCOSE QUALITATIVE U: NEGATIVE
GLUCOSE SERPL-MCNC: 90 MG/DL
HBA1C MFR BLD HPLC: 5.7 %
HCT VFR BLD CALC: 42.1 %
HDLC SERPL-MCNC: 56 MG/DL
HGB BLD-MCNC: 13.4 G/DL
HYALINE CASTS: 2 /LPF
IMM GRANULOCYTES NFR BLD AUTO: 0.3 %
KETONES URINE: NEGATIVE
LDLC SERPL CALC-MCNC: 248 MG/DL
LEUKOCYTE ESTERASE URINE: ABNORMAL
LYMPHOCYTES # BLD AUTO: 2.34 K/UL
LYMPHOCYTES NFR BLD AUTO: 29.9 %
MAN DIFF?: NORMAL
MCHC RBC-ENTMCNC: 31.8 GM/DL
MCHC RBC-ENTMCNC: 32.4 PG
MCV RBC AUTO: 101.9 FL
MICROSCOPIC-UA: NORMAL
MONOCYTES # BLD AUTO: 0.97 K/UL
MONOCYTES NFR BLD AUTO: 12.4 %
NEUTROPHILS # BLD AUTO: 4.28 K/UL
NEUTROPHILS NFR BLD AUTO: 54.7 %
NITRITE URINE: NEGATIVE
PH URINE: 5
PLATELET # BLD AUTO: 413 K/UL
POTASSIUM SERPL-SCNC: 4.4 MMOL/L
PROT SERPL-MCNC: 7 G/DL
PROTEIN URINE: NEGATIVE
RBC # BLD: 4.13 M/UL
RBC # FLD: 14.6 %
RED BLOOD CELLS URINE: 2 /HPF
SODIUM SERPL-SCNC: 140 MMOL/L
SPECIFIC GRAVITY URINE: 1.02
SQUAMOUS EPITHELIAL CELLS: 2 /HPF
SURGICAL PATHOLOGY STUDY: SIGNIFICANT CHANGE UP
T3FREE SERPL-MCNC: 2.82 PG/ML
T4 FREE SERPL-MCNC: 1.1 NG/DL
TRIGL SERPL-MCNC: 177 MG/DL
TSH SERPL-ACNC: 1.42 UIU/ML
UROBILINOGEN URINE: NORMAL
VIT B12 SERPL-MCNC: 650 PG/ML
WBC # FLD AUTO: 7.82 K/UL
WHITE BLOOD CELLS URINE: 5 /HPF

## 2020-03-11 RX ORDER — MULTIVIT-MIN/FOLIC/VIT K/LYCOP 400-300MCG
25 MCG TABLET ORAL DAILY
Qty: 90 | Refills: 1 | Status: ACTIVE | COMMUNITY
Start: 2020-03-11

## 2020-03-15 ENCOUNTER — FORM ENCOUNTER (OUTPATIENT)
Age: 75
End: 2020-03-15

## 2020-03-16 ENCOUNTER — APPOINTMENT (OUTPATIENT)
Dept: ULTRASOUND IMAGING | Facility: CLINIC | Age: 75
End: 2020-03-16
Payer: MEDICARE

## 2020-03-16 ENCOUNTER — OUTPATIENT (OUTPATIENT)
Dept: OUTPATIENT SERVICES | Facility: HOSPITAL | Age: 75
LOS: 1 days | End: 2020-03-16
Payer: MEDICARE

## 2020-03-16 DIAGNOSIS — Z00.00 ENCOUNTER FOR GENERAL ADULT MEDICAL EXAMINATION WITHOUT ABNORMAL FINDINGS: ICD-10-CM

## 2020-03-16 DIAGNOSIS — Z98.89 OTHER SPECIFIED POSTPROCEDURAL STATES: Chronic | ICD-10-CM

## 2020-03-16 DIAGNOSIS — H26.40 UNSPECIFIED SECONDARY CATARACT: Chronic | ICD-10-CM

## 2020-03-16 DIAGNOSIS — I65.21 OCCLUSION AND STENOSIS OF RIGHT CAROTID ARTERY: ICD-10-CM

## 2020-03-16 PROCEDURE — 93880 EXTRACRANIAL BILAT STUDY: CPT | Mod: 26

## 2020-03-16 PROCEDURE — 93880 EXTRACRANIAL BILAT STUDY: CPT

## 2020-04-06 ENCOUNTER — APPOINTMENT (OUTPATIENT)
Dept: GASTROENTEROLOGY | Facility: CLINIC | Age: 75
End: 2020-04-06

## 2020-04-09 ENCOUNTER — APPOINTMENT (OUTPATIENT)
Dept: CARDIOLOGY | Facility: CLINIC | Age: 75
End: 2020-04-09

## 2020-04-15 LAB
INR PPP: 0.96 RATIO
PT BLD: 10.9 SEC

## 2020-04-16 LAB — AFP-TM SERPL-MCNC: 7.7 NG/ML

## 2020-04-20 ENCOUNTER — RX RENEWAL (OUTPATIENT)
Age: 75
End: 2020-04-20

## 2020-04-24 ENCOUNTER — APPOINTMENT (OUTPATIENT)
Dept: GASTROENTEROLOGY | Facility: CLINIC | Age: 75
End: 2020-04-24
Payer: MEDICARE

## 2020-04-24 PROCEDURE — 99443: CPT | Mod: CR

## 2020-04-24 NOTE — ASSESSMENT
[FreeTextEntry1] : A/P 30  min telemed visit\par Lymphocytic colitis\par - will alfredo down on the entocort to 2 pills a day X 2 weeks, then 1 pill qd. If diarrhea reoccurs, then will restart the enot gwen. may consider starting liadla next visit. F/U in 3 months

## 2020-04-24 NOTE — PHYSICAL EXAM
[General Appearance - Well Nourished] : well nourished [General Appearance - Well Developed] : well developed [Oriented To Time, Place, And Person] : oriented to person, place, and time [Impaired Insight] : insight and judgment were intact [Affect] : the affect was normal

## 2020-04-24 NOTE — HISTORY OF PRESENT ILLNESS
[de-identified] : Patient gave verbal consent for telemed vist  at 1;45 on 4/24. Visit was 30 minutes\par      pateint with hx of ETOH - stopped few months ago. , DILI, afib - ablation. Pt with intermittent  diarrhea X 1 year.  2 months ago, the diarrhea was severe. 8 loose stools a day. Cramping. Stool studies were negative. Low elastase- diarrhea no better with creon. KUB negative. 6/19 celiac panel negative. Colonoscopy in march 2020 showed diverticulosis and hemorrhoids. Bx + for lymphocytic colitis. She is now on entocort 3 mg tid. Now with constipation, sometimes no BM for 5 days . \par      ETOH- DILI. IN march 2020, lft normal, afp normal. Follows with hepatology.

## 2020-04-27 ENCOUNTER — APPOINTMENT (OUTPATIENT)
Dept: GASTROENTEROLOGY | Facility: CLINIC | Age: 75
End: 2020-04-27

## 2020-06-25 ENCOUNTER — APPOINTMENT (OUTPATIENT)
Dept: HEPATOLOGY | Facility: CLINIC | Age: 75
End: 2020-06-25

## 2020-09-01 ENCOUNTER — APPOINTMENT (OUTPATIENT)
Dept: INTERNAL MEDICINE | Facility: CLINIC | Age: 75
End: 2020-09-01
Payer: MEDICARE

## 2020-09-01 VITALS
DIASTOLIC BLOOD PRESSURE: 80 MMHG | TEMPERATURE: 97.6 F | HEART RATE: 75 BPM | WEIGHT: 182 LBS | SYSTOLIC BLOOD PRESSURE: 124 MMHG | RESPIRATION RATE: 14 BRPM | BODY MASS INDEX: 35.54 KG/M2

## 2020-09-01 DIAGNOSIS — R68.84 JAW PAIN: ICD-10-CM

## 2020-09-01 PROCEDURE — 36415 COLL VENOUS BLD VENIPUNCTURE: CPT

## 2020-09-01 PROCEDURE — G0008: CPT

## 2020-09-01 PROCEDURE — 90662 IIV NO PRSV INCREASED AG IM: CPT

## 2020-09-01 PROCEDURE — 99214 OFFICE O/P EST MOD 30 MIN: CPT | Mod: 25

## 2020-09-01 RX ORDER — PANCRELIPASE 36000; 180000; 114000 [USP'U]/1; [USP'U]/1; [USP'U]/1
36000-114000 CAPSULE, DELAYED RELEASE PELLETS ORAL 3 TIMES DAILY
Refills: 0 | Status: DISCONTINUED | COMMUNITY
Start: 2020-03-02 | End: 2020-09-01

## 2020-09-01 RX ORDER — BUDESONIDE 3 MG/1
3 CAPSULE, COATED PELLETS ORAL
Qty: 270 | Refills: 0 | Status: DISCONTINUED | COMMUNITY
Start: 2020-03-12 | End: 2020-09-01

## 2020-09-01 RX ORDER — BUDESONIDE 9 MG/1
9 TABLET, EXTENDED RELEASE ORAL DAILY
Qty: 90 | Refills: 1 | Status: DISCONTINUED | COMMUNITY
Start: 2020-03-13 | End: 2020-09-01

## 2020-09-03 NOTE — PHYSICAL EXAM
[No Acute Distress] : no acute distress [No Respiratory Distress] : no respiratory distress  [Clear to Auscultation] : lungs were clear to auscultation bilaterally [Normal Rate] : normal rate  [Regular Rhythm] : with a regular rhythm [Normal Affect] : the affect was normal [Normal Mood] : the mood was normal [PERRL] : pupils equal round and reactive to light [Normal Outer Ear/Nose] : the outer ears and nose were normal in appearance [Normal Oropharynx] : the oropharynx was normal [Normal TMs] : both tympanic membranes were normal [Normal Nasal Mucosa] : the nasal mucosa was normal [No Lymphadenopathy] : no lymphadenopathy [No Focal Deficits] : no focal deficits [de-identified] : +frontal and maxillary pain right side

## 2020-09-03 NOTE — HISTORY OF PRESENT ILLNESS
[FreeTextEntry1] : On anxiety/obesity/PAF [de-identified] : Patient presents for followup on anxiety/obesity/PAF. Patient is currently fasting for today's labs. Patient is currently on Zoloft for anxiety, on aspirin for PAF and has not made any lifestyle changes as of yet to improve her obesity. \par -agrees to COVID ab testing\par -Patient continues with right sided frontal/right maxillary discomfort that she mentioned with her physical in March. Patient states she gets it daily and has to take aspirin to relieve it. Patient has no associated congestion/postnasal drip/vision changes/extremity weakness/nausea/vomiting.\par \par

## 2020-09-03 NOTE — ASSESSMENT
[FreeTextEntry1] : HD flu vaccine given without side effects or reactions.Venipuncture done in our office, Labs sent out and further recommendations will be made based on lab results.CT brain/sinuses ordered, further management pending imaging. Patient advised to continue present medications with diet/exercise and specialists followup.RTO in march for CP\par \par Dr. Jefferson was present in office building while I examined patient\par \par \par Discussed shingles vaccine\par Bone density-1/18-Rx given for followup\par Hlayosxla-YKH-1/2020\par Colonoscopy 2/18\par Endoscopy 1/18\par Specialists include\par 1. GI-Dr. Lang\par 2. Cardiology-Dr. Brooke\par 3. GYN-Dr. Zuñiga\par 4. Ophthalmology-Dr. Eric anderson\par 5. Orthopedic p.r.n.-Dr. Read/Dr. Thompson\par 6. Neurology-Dr. Ashraf\par 7. Vascular-Dr. Alejandre\par 8. Hepatologist- \par Carotid sonogram 3/2020 with +right stenosis\par Hepatitis C screening in the past was negative, declines HIV screening\par CT lung=N/A\par Abd sono 3/2020=normal

## 2020-10-07 DIAGNOSIS — K52.838 OTHER MICROSCOPIC COLITIS: ICD-10-CM

## 2020-12-03 ENCOUNTER — RX RENEWAL (OUTPATIENT)
Age: 75
End: 2020-12-03

## 2021-01-04 ENCOUNTER — RX RENEWAL (OUTPATIENT)
Age: 76
End: 2021-01-04

## 2021-03-23 NOTE — HEALTH RISK ASSESSMENT
[Good] : ~his/her~ current health as good [Fair] :  ~his/her~ mood as fair [] : No [Yes] : Yes [No] : In the past 12 months have you used drugs other than those required for medical reasons? No [No falls in past year] : Patient reported no falls in the past year [0] : 2) Feeling down, depressed, or hopeless: Not at all (0) [Audit-CScore] : 0 [de-identified] : active [de-identified] : good [[id number]] : 0 [Change in mental status noted] : No change in mental status noted [None] : None [With Significant Other] : lives with significant other [# of Members in Household ___] :  household currently consist of [unfilled] member(s) [Employed] : employed [High School] : high school [] :  [# Of Children ___] : has [unfilled] children [Sexually Active] : sexually active [Feels Safe at Home] : Feels safe at home [Fully functional (bathing, dressing, toileting, transferring, walking, feeding)] : Fully functional (bathing, dressing, toileting, transferring, walking, feeding) [Fully functional (using the telephone, shopping, preparing meals, housekeeping, doing laundry, using] : Fully functional and needs no help or supervision to perform IADLs (using the telephone, shopping, preparing meals, housekeeping, doing laundry, using transportation, managing medications and managing finances) [Reports changes in hearing] : Reports changes in hearing [Reports changes in vision] : Reports no changes in vision [Reports changes in dental health] : Reports no changes in dental health [Smoke Detector] : smoke detector [Carbon Monoxide Detector] : carbon monoxide detector [Guns at Home] : no guns at home [Seat Belt] :  uses seat belt [Sunscreen] : uses sunscreen [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

## 2021-03-23 NOTE — COUNSELING
[Healthy eating counseling provided] : healthy eating [Low Fat Diet] : Low fat diet [Decrease Portions] : Decrease food portions [Walking] : Walking [None] : None [Needs reinforcement, provided] : Patient needs reinforcement on understanding lifestyle changes and  the steps needed to achieve self management goals and reinforcement was provided [de-identified] : The patient again was told she should totally abstain from alcohol

## 2021-03-23 NOTE — PHYSICAL EXAM
[General Appearance - Alert] : alert [General Appearance - In No Acute Distress] : in no acute distress [Sclera] : the sclera and conjunctiva were normal [PERRL With Normal Accommodation] : pupils were equal in size, round, and reactive to light [Extraocular Movements] : extraocular movements were intact [Outer Ear] : the ears and nose were normal in appearance [Oropharynx] : the oropharynx was normal [Neck Appearance] : the appearance of the neck was normal [Neck Cervical Mass (___cm)] : no neck mass was observed [Jugular Venous Distention Increased] : there was no jugular-venous distention [Thyroid Diffuse Enlargement] : the thyroid was not enlarged [Thyroid Nodule] : there were no palpable thyroid nodules [Auscultation Breath Sounds / Voice Sounds] : lungs were clear to auscultation bilaterally [Heart Rate And Rhythm] : heart rate was normal and rhythm regular [Heart Sounds] : normal S1 and S2 [Heart Sounds Gallop] : no gallops [Murmurs] : no murmurs [Heart Sounds Pericardial Friction Rub] : no pericardial rub [Arterial Pulses Carotid] : carotid pulses were normal with no bruits [Abdominal Aorta] : the abdominal aorta was normal [Arterial Pulses Femoral] : femoral pulses were normal without bruits [Full Pulse] : the pedal pulses are present [Edema] : there was no peripheral edema [Veins - Varicosity Changes] : there were no varicosital changes [Bowel Sounds] : normal bowel sounds [Abdomen Soft] : soft [Abdomen Tenderness] : non-tender [Abdomen Mass (___ Cm)] : no abdominal mass palpated [FreeTextEntry1] : Obese [Cervical Lymph Nodes Enlarged Posterior Bilaterally] : posterior cervical [Cervical Lymph Nodes Enlarged Anterior Bilaterally] : anterior cervical [Supraclavicular Lymph Nodes Enlarged Bilaterally] : supraclavicular [Axillary Lymph Nodes Enlarged Bilaterally] : axillary [Femoral Lymph Nodes Enlarged Bilaterally] : femoral [Inguinal Lymph Nodes Enlarged Bilaterally] : inguinal [No Spinal Tenderness] : no spinal tenderness [Abnormal Walk] : normal gait [Nail Clubbing] : no clubbing  or cyanosis of the fingernails [Musculoskeletal - Swelling] : no joint swelling seen [Motor Tone] : muscle strength and tone were normal [Skin Color & Pigmentation] : normal skin color and pigmentation [Skin Turgor] : normal skin turgor [] : no rash [Cranial Nerves] : cranial nerves 2-12 were intact [No Focal Deficits] : no focal deficits [Oriented To Time, Place, And Person] : oriented to person, place, and time [Impaired Insight] : insight and judgment were intact [Affect] : the affect was normal

## 2021-03-23 NOTE — HISTORY OF PRESENT ILLNESS
[FreeTextEntry1] : 75-year-old female presents for her yearly physical with history of atrial fibrillation for which she had ablation about 5 years ago with cardiology followup and remains in sinus rhythm. For this she continues on an aspirin daily.\par \par The patient's significant issue this past year is December 2017 she was admitted and found to have acute hepatitis felt secondary to drug-induced liver injury secondary to Lipitor.\par Evaluation was done including MRIs which showed cirrhosis which was felt to be due to patient's chronic alcohol intake.\par Patient's liver functions have gone back to normal.\par She is following with hepatologist\par The patient had continued to drink occasional alcohol but states she has not drank alcohol for about 3 months.\par \par Patient with moderate right carotid stenosis which is monitored by vascular and cardiology.\par \par Patient with hyperlipidemia on Crestor.\par \par She did have a right total knee replacement October 2014 which went fairly well but still has occasional discomfort with a known Baker's cyst.\par \par Otherwise she is feeling relatively well without chest pain, palpitations or shortness of breath.\par Her weight is about the same without any regular exercise regimen.\par \par The patient's main issue is that she has had significant diarrhea for about 2 months with etiology thus far unclear.\par She saw one gastroenterologist with testing showing low pancreatic enzyme therefore possibility of pancreatic insufficiency therefore patient is on Creon with questionable benefit.\par She does state her her diarrhea is less but continues.\par She had a colonoscopy last week the results of which are pending.\par \par The patient has anxiety and depression and is on Zoloft 50 mg  1-1/2 tabs daily.

## 2021-03-23 NOTE — ASSESSMENT
[FreeTextEntry1] : 75-year-old female with good general health with continued need for lifestyle changes with diet exercise and weight loss.\par A. fib with status post ablation and clinically remains in sinus rhythm.\par \par Patient status post acute hepatitis secondary to drug-induced liver injury likely secondary to Lipitor with return to normal liver functions.\par \par Workup showed the patient to have liver cirrhosis likely secondary to chronic alcohol intake which was daily. The patient is now totally abstaining from alcohol and again strongly to continue\par \par Patient now with persistent diarrhea of questionable etiology therefore followup with gastroenterology as scheduled\par \par Carotid stenosis stable and on Crestor\par \par Patient with chronic anxiety and depression on Zoloft to 75 mg daily\par \par Followup with cardiology and hepatology as scheduled\par \par Bone density January 2018\par Mammography January 2020\par Colonoscopy March 2020\par Endoscopy January 2018\par GYN yearly\par \par Vaccines up to date (including hepatitis A and hepatitis B) other than shingles vaccine.\par \par Venipuncture done today in the office\par \par Followup in 6 months\par \par

## 2021-03-24 ENCOUNTER — APPOINTMENT (OUTPATIENT)
Dept: INTERNAL MEDICINE | Facility: CLINIC | Age: 76
End: 2021-03-24

## 2021-05-13 ENCOUNTER — APPOINTMENT (OUTPATIENT)
Dept: INTERNAL MEDICINE | Facility: CLINIC | Age: 76
End: 2021-05-13
Payer: MEDICARE

## 2021-05-13 VITALS
HEART RATE: 77 BPM | DIASTOLIC BLOOD PRESSURE: 80 MMHG | HEIGHT: 60 IN | SYSTOLIC BLOOD PRESSURE: 122 MMHG | BODY MASS INDEX: 35.34 KG/M2 | WEIGHT: 180 LBS | TEMPERATURE: 97.2 F | RESPIRATION RATE: 16 BRPM

## 2021-05-13 PROCEDURE — 99072 ADDL SUPL MATRL&STAF TM PHE: CPT

## 2021-05-13 PROCEDURE — 99213 OFFICE O/P EST LOW 20 MIN: CPT

## 2021-05-13 NOTE — ASSESSMENT
[FreeTextEntry1] : Acute pain left knee with process to a lesser degree with benefit after anti-inflammatories.\par likely strain with blood with possibility of internal damage.\par plan is for conservative treatment including RICE and low dose Motrin twice a day with food\par If symptoms persist patient is to call or see orthopedics.

## 2021-05-13 NOTE — PHYSICAL EXAM
[No Acute Distress] : no acute distress [Well-Appearing] : well-appearing [No Respiratory Distress] : no respiratory distress  [Normal Rate] : normal rate  [No Accessory Muscle Use] : no accessory muscle use [No Edema] : there was no peripheral edema [No Focal Deficits] : no focal deficits [de-identified] : Left knee with mild swelling without erythema medially with tenderness to palpation in this area. No instability of the joint. Normal range of motion. No crepitus. No edema. Normal range of motion. [de-identified] : Ambulates with a slight limp

## 2021-05-13 NOTE — HISTORY OF PRESENT ILLNESS
[FreeTextEntry8] : The patient presents for an acute visit secondary to acute left knee pain.\par She states that 2 nights ago she was getting into her car on the 's side with all her weight on her left leg with acute pain of her left knee with almost made her fall. The acute pain gradually improved but continues with pain with certain movements and causes her to limp when she walks.\par Some swelling.\par She has been icing it and using a compression wrap and occasional Motrin which helps when she takes it.\par For months prior to this she would occasionally have these symptoms but not acute white this.

## 2021-05-19 ENCOUNTER — APPOINTMENT (OUTPATIENT)
Dept: INTERNAL MEDICINE | Facility: CLINIC | Age: 76
End: 2021-05-19
Payer: MEDICARE

## 2021-05-19 VITALS
RESPIRATION RATE: 12 BRPM | SYSTOLIC BLOOD PRESSURE: 120 MMHG | HEIGHT: 60 IN | BODY MASS INDEX: 35.34 KG/M2 | WEIGHT: 180 LBS | DIASTOLIC BLOOD PRESSURE: 80 MMHG | HEART RATE: 78 BPM

## 2021-05-19 PROCEDURE — 36415 COLL VENOUS BLD VENIPUNCTURE: CPT

## 2021-05-19 PROCEDURE — 99214 OFFICE O/P EST MOD 30 MIN: CPT | Mod: 25

## 2021-05-19 NOTE — HEALTH RISK ASSESSMENT
[Fully functional (bathing, dressing, toileting, transferring, walking, feeding)] : Fully functional (bathing, dressing, toileting, transferring, walking, feeding) [Fully functional (using the telephone, shopping, preparing meals, housekeeping, doing laundry, using] : Fully functional and needs no help or supervision to perform IADLs (using the telephone, shopping, preparing meals, housekeeping, doing laundry, using transportation, managing medications and managing finances) [No] : In the past 12 months have you used drugs other than those required for medical reasons? No [No falls in past year] : Patient reported no falls in the past year

## 2021-05-20 ENCOUNTER — NON-APPOINTMENT (OUTPATIENT)
Age: 76
End: 2021-05-20

## 2021-05-20 LAB
ALBUMIN SERPL ELPH-MCNC: 4.1 G/DL
ALP BLD-CCNC: 76 U/L
ALT SERPL-CCNC: 19 U/L
ANION GAP SERPL CALC-SCNC: 12 MMOL/L
AST SERPL-CCNC: 19 U/L
BASOPHILS # BLD AUTO: 0.07 K/UL
BASOPHILS NFR BLD AUTO: 1.2 %
BILIRUB SERPL-MCNC: 0.5 MG/DL
BUN SERPL-MCNC: 17 MG/DL
CALCIUM SERPL-MCNC: 9.5 MG/DL
CHLORIDE SERPL-SCNC: 106 MMOL/L
CHOLEST SERPL-MCNC: 266 MG/DL
CO2 SERPL-SCNC: 19 MMOL/L
CREAT SERPL-MCNC: 0.62 MG/DL
EOSINOPHIL # BLD AUTO: 0.3 K/UL
EOSINOPHIL NFR BLD AUTO: 5.1 %
ESTIMATED AVERAGE GLUCOSE: 114 MG/DL
GLUCOSE SERPL-MCNC: 107 MG/DL
HBA1C MFR BLD HPLC: 5.6 %
HCT VFR BLD CALC: 42.6 %
HDLC SERPL-MCNC: 65 MG/DL
HGB BLD-MCNC: 13.6 G/DL
IMM GRANULOCYTES NFR BLD AUTO: 0.2 %
LDLC SERPL CALC-MCNC: 182 MG/DL
LYMPHOCYTES # BLD AUTO: 1.57 K/UL
LYMPHOCYTES NFR BLD AUTO: 26.7 %
MAN DIFF?: NORMAL
MCHC RBC-ENTMCNC: 31.8 PG
MCHC RBC-ENTMCNC: 31.9 GM/DL
MCV RBC AUTO: 99.5 FL
MONOCYTES # BLD AUTO: 0.82 K/UL
MONOCYTES NFR BLD AUTO: 13.9 %
NEUTROPHILS # BLD AUTO: 3.12 K/UL
NEUTROPHILS NFR BLD AUTO: 52.9 %
NONHDLC SERPL-MCNC: 202 MG/DL
PLATELET # BLD AUTO: 352 K/UL
POTASSIUM SERPL-SCNC: 4.4 MMOL/L
PROT SERPL-MCNC: 6.6 G/DL
RBC # BLD: 4.28 M/UL
RBC # FLD: 15.4 %
SODIUM SERPL-SCNC: 137 MMOL/L
TRIGL SERPL-MCNC: 96 MG/DL
TSH SERPL-ACNC: 2.24 UIU/ML
WBC # FLD AUTO: 5.89 K/UL

## 2021-05-20 NOTE — HISTORY OF PRESENT ILLNESS
[FreeTextEntry1] : On anxiety/obesity/PAF [de-identified] : Patient presents for followup on anxiety/obesity/PAF. Patient is currently fasting for today's labs. Patient is currently on Zoloft for anxiety, on aspirin for PAF and has not made any lifestyle changes as of yet to improve her obesity. \par -got covid vaccines\par -See last note, patient continues with knee issue and is scheduled to see orthopedic in 2 days\par \par

## 2021-05-20 NOTE — ASSESSMENT
[FreeTextEntry1] : Venipuncture done in our office, Labs sent out and further recommendations will be made based on lab results. Patient advised to continue present medications with diet/exercise and specialists followup.RTO in 3-4months for CP\par \par \par Discussed shingles vaccine, +got covid vaccines\par Bone density-1/18-"to do"=referral given\par Mammogram-"to do"=referral given\par Colonoscopy 2/2018\par Endoscopy 1/18\par Specialists include\par 1. GI-Dr. Lang\par 2. Cardiology-Dr. Brooke\par 3. GYN-Dr. Zuñiga=has not been in years\par 4. Ophthalmology-Dr. Eric anderson\par 5. Orthopedic p.r.n.-Dr. Read/Dr. Thompson\par 6. Neurology-Dr. Ashraf\par 7. Vascular-Dr. Alejandre\par 8. Hepatologist- \par Carotid sonogram 3/2020 with +right stenosis=referral given to repeat\par Hepatitis C screening in the past was negative, declines HIV screening\par CT lung=N/A\par Abd sono 3/2020=normal

## 2021-05-20 NOTE — ADDENDUM
[FreeTextEntry1] : Patient brought me back in the room as requested per medical assistant after drawing her blood and patient reports 6 weeks ago she jammed her left fourth finger. Patient has full mobility PIP joint is still slightly swollen and is questioning what she should do\par \par \par Exam shows\par Left fourth finger with full mobility, swelling and slightly bony abnormality PIP joint, appears more arthritic\par Offered patient x-ray\par She will discuss w/ orthopedic appointment in 2 days\par \par \par Dr. Jefferson was present in office building while I examined patient\par \par Labs show\par -chol elevated on max crestor=rec add zetia=Patient states she has been noncompliant with Crestor, to restart taking daily

## 2021-05-22 ENCOUNTER — APPOINTMENT (OUTPATIENT)
Dept: ORTHOPEDIC SURGERY | Facility: CLINIC | Age: 76
End: 2021-05-22
Payer: MEDICARE

## 2021-05-22 VITALS
BODY MASS INDEX: 35.34 KG/M2 | WEIGHT: 180 LBS | SYSTOLIC BLOOD PRESSURE: 127 MMHG | DIASTOLIC BLOOD PRESSURE: 76 MMHG | HEIGHT: 60 IN

## 2021-05-22 DIAGNOSIS — M17.12 UNILATERAL PRIMARY OSTEOARTHRITIS, LEFT KNEE: ICD-10-CM

## 2021-05-22 PROCEDURE — 99214 OFFICE O/P EST MOD 30 MIN: CPT

## 2021-05-22 PROCEDURE — 73562 X-RAY EXAM OF KNEE 3: CPT | Mod: LT

## 2021-05-22 NOTE — HISTORY OF PRESENT ILLNESS
[de-identified] : This patient presents with h/o left knee pain for 1 week. She thinks she may have twisted it a little getting into the car. Most of the pain is located at medial aspect. Pain is described as aching/throbbing. Patient admits to mild stiffness in the knee. There is pain when ambulating and stairs are difficult.  Patient reports swelling.  There is aching at night. There are no catching, locking or giving way episodes.  Patient has not had any treatment for this in the past. Patient has tried ice and ibuprofen 200mg 1-2 every 4 hours. It is helping. She states she has a Baker's Cyst.\par

## 2021-05-22 NOTE — REVIEW OF SYSTEMS
[Negative] : Heme/Lymph [Joint Swelling] : joint swelling [Feeling Tired] : fatigue [Decrease Hearing] : decrease hearing [Dizziness] : dizziness [Depression] : depression [Sleep Disturbances] : ~T sleep disturbances

## 2021-05-22 NOTE — DISCUSSION/SUMMARY
[de-identified] : X-rays were reviewed with patient.\par The pathophysiology and anatomy were reviewed in detail with the patient, who expressed understanding.\par Discussed the role of avoidance of irritating activities, ice, NSAIDs, visco supplementation, intra-articular steroid injections, physical therapy, home exercise program. Patient will continue with ice, ibuprofen (will add Pepcid for GI protection), and will work on a HEP. She will call for an appointment for an intra-articular steroid injection if she does not improve. \par \par

## 2021-05-22 NOTE — PHYSICAL EXAM
[Normal] : no peripheral adenopathy appreciated [Slightly Antalgic] : slightly antalgic [Normal RLE] : Right Lower Extremity: No scars, rashes, lesions, ulcers, skin intact [Normal LLE] : Left Lower Extremity: No scars, rashes, lesions, ulcers, skin intact [de-identified] : Left Knee:\par +valgus deformity\par no erythema, ecchymosis\par no swelling or effusion\par tender to palpation medially\par + Medial JLT \par no Lateral JLT\par + palpable popliteal cyst\par no pes anserine tenderness\par no significant crepitus\par FROM with discomfort at full knee flexion\par 5/5 motor\par no laxity with varus/valgus stress\par negative Lachman\par calf supple and NT\par \par  [de-identified] : no swelling, no edema, skin warm and well perfused\par  [de-identified] : Normal rate, no increased respiratory effort, no use of accessory muscles, no nasal flaring\par  [de-identified] : X-rays obtained of the Left knee reveal mild medial compartment narrowing, mild patellofemoral degenerative change. No obvious fractures, dislocations, or osseous lesions seen.\par \par \par

## 2021-06-01 ENCOUNTER — RX RENEWAL (OUTPATIENT)
Age: 76
End: 2021-06-01

## 2021-06-01 ENCOUNTER — APPOINTMENT (OUTPATIENT)
Dept: ORTHOPEDIC SURGERY | Facility: CLINIC | Age: 76
End: 2021-06-01

## 2021-06-07 ENCOUNTER — APPOINTMENT (OUTPATIENT)
Dept: ORTHOPEDIC SURGERY | Facility: CLINIC | Age: 76
End: 2021-06-07

## 2021-06-08 ENCOUNTER — NON-APPOINTMENT (OUTPATIENT)
Age: 76
End: 2021-06-08

## 2021-06-09 ENCOUNTER — NON-APPOINTMENT (OUTPATIENT)
Age: 76
End: 2021-06-09

## 2021-06-10 ENCOUNTER — NON-APPOINTMENT (OUTPATIENT)
Age: 76
End: 2021-06-10

## 2021-06-28 ENCOUNTER — RX RENEWAL (OUTPATIENT)
Age: 76
End: 2021-06-28

## 2021-07-07 ENCOUNTER — NON-APPOINTMENT (OUTPATIENT)
Age: 76
End: 2021-07-07

## 2021-07-08 ENCOUNTER — NON-APPOINTMENT (OUTPATIENT)
Age: 76
End: 2021-07-08

## 2021-07-12 ENCOUNTER — APPOINTMENT (OUTPATIENT)
Dept: CARDIOLOGY | Facility: CLINIC | Age: 76
End: 2021-07-12
Payer: MEDICARE

## 2021-07-12 VITALS
SYSTOLIC BLOOD PRESSURE: 118 MMHG | WEIGHT: 180 LBS | HEART RATE: 81 BPM | OXYGEN SATURATION: 97 % | DIASTOLIC BLOOD PRESSURE: 78 MMHG | RESPIRATION RATE: 15 BRPM | BODY MASS INDEX: 35.34 KG/M2 | HEIGHT: 60 IN | TEMPERATURE: 97.9 F

## 2021-07-12 DIAGNOSIS — R01.1 CARDIAC MURMUR, UNSPECIFIED: ICD-10-CM

## 2021-07-12 PROCEDURE — 93000 ELECTROCARDIOGRAM COMPLETE: CPT

## 2021-07-12 PROCEDURE — 99214 OFFICE O/P EST MOD 30 MIN: CPT | Mod: 25

## 2021-07-12 RX ORDER — BUDESONIDE 3 MG/1
3 CAPSULE, COATED PELLETS ORAL
Qty: 270 | Refills: 1 | Status: DISCONTINUED | COMMUNITY
Start: 2020-04-24 | End: 2021-07-12

## 2021-07-12 RX ORDER — CHOLESTYRAMINE 4 G/9G
4 POWDER, FOR SUSPENSION ORAL TWICE DAILY
Qty: 1 | Refills: 3 | Status: DISCONTINUED | COMMUNITY
Start: 2020-10-07 | End: 2021-07-12

## 2021-07-14 ENCOUNTER — EMERGENCY (EMERGENCY)
Facility: HOSPITAL | Age: 76
LOS: 1 days | Discharge: HOME | End: 2021-07-14
Attending: EMERGENCY MEDICINE
Payer: MEDICARE

## 2021-07-14 ENCOUNTER — NON-APPOINTMENT (OUTPATIENT)
Age: 76
End: 2021-07-14

## 2021-07-14 VITALS
HEART RATE: 88 BPM | WEIGHT: 179.9 LBS | SYSTOLIC BLOOD PRESSURE: 153 MMHG | DIASTOLIC BLOOD PRESSURE: 80 MMHG | HEIGHT: 62 IN | RESPIRATION RATE: 18 BRPM | TEMPERATURE: 98 F | OXYGEN SATURATION: 97 %

## 2021-07-14 DIAGNOSIS — Z98.89 OTHER SPECIFIED POSTPROCEDURAL STATES: Chronic | ICD-10-CM

## 2021-07-14 DIAGNOSIS — H26.40 UNSPECIFIED SECONDARY CATARACT: Chronic | ICD-10-CM

## 2021-07-14 LAB
ALBUMIN SERPL ELPH-MCNC: 4.1 G/DL — SIGNIFICANT CHANGE UP (ref 3.3–5.2)
ALP SERPL-CCNC: 70 U/L — SIGNIFICANT CHANGE UP (ref 40–120)
ALT FLD-CCNC: 20 U/L — SIGNIFICANT CHANGE UP
ANION GAP SERPL CALC-SCNC: 12 MMOL/L — SIGNIFICANT CHANGE UP (ref 5–17)
APPEARANCE UR: CLEAR — SIGNIFICANT CHANGE UP
APTT BLD: 27.4 SEC — LOW (ref 27.5–35.5)
AST SERPL-CCNC: 25 U/L — SIGNIFICANT CHANGE UP
BASOPHILS # BLD AUTO: 0.04 K/UL — SIGNIFICANT CHANGE UP (ref 0–0.2)
BASOPHILS NFR BLD AUTO: 0.4 % — SIGNIFICANT CHANGE UP (ref 0–2)
BILIRUB SERPL-MCNC: 0.6 MG/DL — SIGNIFICANT CHANGE UP (ref 0.4–2)
BILIRUB UR-MCNC: NEGATIVE — SIGNIFICANT CHANGE UP
BUN SERPL-MCNC: 16.7 MG/DL — SIGNIFICANT CHANGE UP (ref 8–20)
CALCIUM SERPL-MCNC: 9.5 MG/DL — SIGNIFICANT CHANGE UP (ref 8.6–10.2)
CHLORIDE SERPL-SCNC: 101 MMOL/L — SIGNIFICANT CHANGE UP (ref 98–107)
CHOLEST SERPL-MCNC: 222 MG/DL — HIGH
CO2 SERPL-SCNC: 23 MMOL/L — SIGNIFICANT CHANGE UP (ref 22–29)
COLOR SPEC: YELLOW — SIGNIFICANT CHANGE UP
CREAT SERPL-MCNC: 0.57 MG/DL — SIGNIFICANT CHANGE UP (ref 0.5–1.3)
DIFF PNL FLD: NEGATIVE — SIGNIFICANT CHANGE UP
EOSINOPHIL # BLD AUTO: 0.04 K/UL — SIGNIFICANT CHANGE UP (ref 0–0.5)
EOSINOPHIL NFR BLD AUTO: 0.4 % — SIGNIFICANT CHANGE UP (ref 0–6)
EPI CELLS # UR: SIGNIFICANT CHANGE UP
ETHANOL SERPL-MCNC: <10 MG/DL — SIGNIFICANT CHANGE UP (ref 0–9)
GLUCOSE SERPL-MCNC: 129 MG/DL — HIGH (ref 70–99)
GLUCOSE UR QL: NEGATIVE MG/DL — SIGNIFICANT CHANGE UP
HCT VFR BLD CALC: 40.5 % — SIGNIFICANT CHANGE UP (ref 34.5–45)
HDLC SERPL-MCNC: 64 MG/DL — SIGNIFICANT CHANGE UP
HGB BLD-MCNC: 13.2 G/DL — SIGNIFICANT CHANGE UP (ref 11.5–15.5)
IMM GRANULOCYTES NFR BLD AUTO: 0.4 % — SIGNIFICANT CHANGE UP (ref 0–1.5)
INR BLD: 1.01 RATIO — SIGNIFICANT CHANGE UP (ref 0.88–1.16)
KETONES UR-MCNC: NEGATIVE — SIGNIFICANT CHANGE UP
LEUKOCYTE ESTERASE UR-ACNC: ABNORMAL
LIPID PNL WITH DIRECT LDL SERPL: 140 MG/DL — HIGH
LYMPHOCYTES # BLD AUTO: 1.32 K/UL — SIGNIFICANT CHANGE UP (ref 1–3.3)
LYMPHOCYTES # BLD AUTO: 13.6 % — SIGNIFICANT CHANGE UP (ref 13–44)
MCHC RBC-ENTMCNC: 32 PG — SIGNIFICANT CHANGE UP (ref 27–34)
MCHC RBC-ENTMCNC: 32.6 GM/DL — SIGNIFICANT CHANGE UP (ref 32–36)
MCV RBC AUTO: 98.3 FL — SIGNIFICANT CHANGE UP (ref 80–100)
MONOCYTES # BLD AUTO: 0.9 K/UL — SIGNIFICANT CHANGE UP (ref 0–0.9)
MONOCYTES NFR BLD AUTO: 9.2 % — SIGNIFICANT CHANGE UP (ref 2–14)
NEUTROPHILS # BLD AUTO: 7.4 K/UL — SIGNIFICANT CHANGE UP (ref 1.8–7.4)
NEUTROPHILS NFR BLD AUTO: 76 % — SIGNIFICANT CHANGE UP (ref 43–77)
NITRITE UR-MCNC: NEGATIVE — SIGNIFICANT CHANGE UP
NON HDL CHOLESTEROL: 158 MG/DL — HIGH
PH UR: 6.5 — SIGNIFICANT CHANGE UP (ref 5–8)
PLATELET # BLD AUTO: 313 K/UL — SIGNIFICANT CHANGE UP (ref 150–400)
POTASSIUM SERPL-MCNC: 4 MMOL/L — SIGNIFICANT CHANGE UP (ref 3.5–5.3)
POTASSIUM SERPL-SCNC: 4 MMOL/L — SIGNIFICANT CHANGE UP (ref 3.5–5.3)
PROT SERPL-MCNC: 7.1 G/DL — SIGNIFICANT CHANGE UP (ref 6.6–8.7)
PROT UR-MCNC: NEGATIVE MG/DL — SIGNIFICANT CHANGE UP
PROTHROM AB SERPL-ACNC: 11.7 SEC — SIGNIFICANT CHANGE UP (ref 10.6–13.6)
RBC # BLD: 4.12 M/UL — SIGNIFICANT CHANGE UP (ref 3.8–5.2)
RBC # FLD: 15.8 % — HIGH (ref 10.3–14.5)
RBC CASTS # UR COMP ASSIST: SIGNIFICANT CHANGE UP /HPF (ref 0–4)
SODIUM SERPL-SCNC: 136 MMOL/L — SIGNIFICANT CHANGE UP (ref 135–145)
SP GR SPEC: 1.01 — SIGNIFICANT CHANGE UP (ref 1.01–1.02)
TRIGL SERPL-MCNC: 91 MG/DL — SIGNIFICANT CHANGE UP
TROPONIN T SERPL-MCNC: <0.01 NG/ML — SIGNIFICANT CHANGE UP (ref 0–0.06)
UROBILINOGEN FLD QL: NEGATIVE MG/DL — SIGNIFICANT CHANGE UP
WBC # BLD: 9.74 K/UL — SIGNIFICANT CHANGE UP (ref 3.8–10.5)
WBC # FLD AUTO: 9.74 K/UL — SIGNIFICANT CHANGE UP (ref 3.8–10.5)
WBC UR QL: SIGNIFICANT CHANGE UP

## 2021-07-14 PROCEDURE — 93010 ELECTROCARDIOGRAM REPORT: CPT

## 2021-07-14 PROCEDURE — 70450 CT HEAD/BRAIN W/O DYE: CPT | Mod: 26,MA

## 2021-07-14 PROCEDURE — 93306 TTE W/DOPPLER COMPLETE: CPT | Mod: 26

## 2021-07-14 PROCEDURE — 99218: CPT

## 2021-07-14 RX ORDER — MECLIZINE HCL 12.5 MG
25 TABLET ORAL ONCE
Refills: 0 | Status: COMPLETED | OUTPATIENT
Start: 2021-07-14 | End: 2021-07-14

## 2021-07-14 RX ORDER — ONDANSETRON 8 MG/1
4 TABLET, FILM COATED ORAL EVERY 6 HOURS
Refills: 0 | Status: DISCONTINUED | OUTPATIENT
Start: 2021-07-14 | End: 2021-07-19

## 2021-07-14 RX ORDER — SODIUM CHLORIDE 9 MG/ML
3 INJECTION INTRAMUSCULAR; INTRAVENOUS; SUBCUTANEOUS ONCE
Refills: 0 | Status: COMPLETED | OUTPATIENT
Start: 2021-07-14 | End: 2021-07-14

## 2021-07-14 RX ADMIN — Medication 25 MILLIGRAM(S): at 19:42

## 2021-07-14 NOTE — ED PROVIDER NOTE - OBJECTIVE STATEMENT
76y/o F with PMHx of HTN, Recurrent dizziness, Brain lesions; congential presents to the ED c/o dizziness which began suddenly at 130pm today. Additional c/o feeling off balanced. Pt states she initially saw Radha Granados several days ago who states she has significant right carotid artery that will need urgent management. This prompted pt to f/u with Dr. Lamar who evaled pt on Monday, stating she will need medical clearance first and Dr. Benitez yesterday who told her she needs an echo to be cleared. Pt states today while driving dizziness came on suddenly with slight unsteady gait, but she endorses she only drank a cup of coffee. She states symptoms persisted, so she went home and ate lunch, reports mild improvement but symptoms progressed prompting her to f/u at ED. Pt currently c/o feeling dizziness, stating she feels unsteady when walking and generally "off". She reports that moving head too fast exacerbates symptoms. She has hx of recurrent dizziness for years, sporadic in nature without formal workup. She found out about brain lesions several years ago while being worked up for MS, which was negative.

## 2021-07-14 NOTE — ED PROVIDER NOTE - CLINICAL SUMMARY MEDICAL DECISION MAKING FREE TEXT BOX
Pt with acute dizziness, little worse then recurrent episodes. Will eval cause of dizziness and r/o posterior circulation disease.

## 2021-07-14 NOTE — ED ADULT NURSE NOTE - OBJECTIVE STATEMENT
Pt sitting upright in stretcher complaining of dizziness, pt is dizzy while laying in bed, no dizziness, while ambulating, pt is in no apparent signs of distress. no blurry vision, stable on monitor , vitals are stable ,PIV site WNL, labs sent, . Pt status unchanged, refer to flowsheet and chart, pt ID band in place, pt safety maintained, pt hemodynamically stable, updated on plan of care. Awaiting on CT. . Call light provided, fall safety reinforced. Will continue to monitor

## 2021-07-14 NOTE — ED ADULT TRIAGE NOTE - CHIEF COMPLAINT QUOTE
Pt c/o episodes of dizziness.  Reports she was told recently that she has a 98% blocked carotid artery and is concerned they are related.  Denies cp, sob

## 2021-07-14 NOTE — ED PROVIDER NOTE - CRANIAL NERVE AND PUPILLARY EXAM
normal heel to shin, finger to nose, negative romberg, no pronator drift, normal gait including heel walking/cranial nerves 2-12 intact

## 2021-07-14 NOTE — ED CDU PROVIDER INITIAL DAY NOTE - MEDICAL DECISION MAKING DETAILS
Patient with dizziness and recently found to have significant carotid stenosis. Patient pending MRI to r/o posterior circulation disease.

## 2021-07-15 ENCOUNTER — NON-APPOINTMENT (OUTPATIENT)
Age: 76
End: 2021-07-15

## 2021-07-15 ENCOUNTER — APPOINTMENT (OUTPATIENT)
Dept: CARDIOLOGY | Facility: CLINIC | Age: 76
End: 2021-07-15

## 2021-07-15 VITALS
HEART RATE: 77 BPM | DIASTOLIC BLOOD PRESSURE: 74 MMHG | TEMPERATURE: 99 F | OXYGEN SATURATION: 98 % | RESPIRATION RATE: 18 BRPM | SYSTOLIC BLOOD PRESSURE: 137 MMHG

## 2021-07-15 PROCEDURE — 93005 ELECTROCARDIOGRAM TRACING: CPT

## 2021-07-15 PROCEDURE — 81001 URINALYSIS AUTO W/SCOPE: CPT

## 2021-07-15 PROCEDURE — 36415 COLL VENOUS BLD VENIPUNCTURE: CPT

## 2021-07-15 PROCEDURE — 70450 CT HEAD/BRAIN W/O DYE: CPT

## 2021-07-15 PROCEDURE — 80053 COMPREHEN METABOLIC PANEL: CPT

## 2021-07-15 PROCEDURE — 70551 MRI BRAIN STEM W/O DYE: CPT

## 2021-07-15 PROCEDURE — 93308 TTE F-UP OR LMTD: CPT

## 2021-07-15 PROCEDURE — 85610 PROTHROMBIN TIME: CPT

## 2021-07-15 PROCEDURE — 85730 THROMBOPLASTIN TIME PARTIAL: CPT

## 2021-07-15 PROCEDURE — G0378: CPT

## 2021-07-15 PROCEDURE — 70551 MRI BRAIN STEM W/O DYE: CPT | Mod: 26,MG

## 2021-07-15 PROCEDURE — 87086 URINE CULTURE/COLONY COUNT: CPT

## 2021-07-15 PROCEDURE — G1004: CPT

## 2021-07-15 PROCEDURE — 84484 ASSAY OF TROPONIN QUANT: CPT

## 2021-07-15 PROCEDURE — 80061 LIPID PANEL: CPT

## 2021-07-15 PROCEDURE — 99217: CPT

## 2021-07-15 PROCEDURE — 85025 COMPLETE CBC W/AUTO DIFF WBC: CPT

## 2021-07-15 PROCEDURE — 99284 EMERGENCY DEPT VISIT MOD MDM: CPT | Mod: 25

## 2021-07-15 PROCEDURE — 80307 DRUG TEST PRSMV CHEM ANLYZR: CPT

## 2021-07-15 RX ORDER — ZOLPIDEM TARTRATE 10 MG/1
5 TABLET ORAL ONCE
Refills: 0 | Status: DISCONTINUED | OUTPATIENT
Start: 2021-07-15 | End: 2021-07-15

## 2021-07-15 RX ORDER — ATORVASTATIN CALCIUM 80 MG/1
80 TABLET, FILM COATED ORAL AT BEDTIME
Refills: 0 | Status: DISCONTINUED | OUTPATIENT
Start: 2021-07-15 | End: 2021-07-19

## 2021-07-15 RX ORDER — SERTRALINE 25 MG/1
50 TABLET, FILM COATED ORAL DAILY
Refills: 0 | Status: DISCONTINUED | OUTPATIENT
Start: 2021-07-15 | End: 2021-07-19

## 2021-07-15 RX ADMIN — ZOLPIDEM TARTRATE 5 MILLIGRAM(S): 10 TABLET ORAL at 02:30

## 2021-07-15 NOTE — ED CDU PROVIDER DISPOSITION NOTE - NSFOLLOWUPINSTRUCTIONS_ED_ALL_ED_FT
- Please follow up with your Primary Care Doctor in 1 - 2 days. If you cannot follow-up with your primary care doctor please return to the Emergency Department for any urgent issues.  - Seek immediate medical care for any new, worsening or concerning signs or symptoms.   - You were given copies of all your test results, please bring to your primary care doctor for review of any abnormal test results  - Follow up with your Vascular surgeon   - Follow up with your Cardiologist  - Follow up with ENT  - Follow up with Neurologist     - If you have difficulty following up, please call: [phone]) or go to [email or web address] to obtain a [facility] doctor or specialist who takes your insurance in your area.    Feel better!     Dizziness    WHAT YOU NEED TO KNOW:    Dizziness is a feeling of being off balance or unsteady. Common causes of dizziness are an inner ear fluid imbalance or a lack of oxygen in your blood. Dizziness may be acute (lasts 3 days or less) or chronic (lasts longer than 3 days). You may have dizzy spells that last from seconds to a few hours.     DISCHARGE INSTRUCTIONS:    Return to the emergency department if:   •You have a headache and a stiff neck.      •You have shaking chills and a fever.       •You vomit over and over with no relief.       •Your vomit or bowel movements are red or black.       •You have pain in your chest, back, or abdomen.       •You have numbness, especially in your face, arms, or legs.       •You have trouble moving your arms or legs.       •You are confused.       Contact your healthcare provider if:   •You have a fever.       •Your symptoms do not get better with treatment.       •You have questions or concerns about your condition or care.       Manage your symptoms:   •Do not drive or operate heavy machinery when you are dizzy.       •Get up slowly from sitting or lying down.       •Drink plenty of liquids. Liquids help prevent dehydration. Ask how much liquid to drink each day and which liquids are best for you.      Follow up with your healthcare provider as directed: Write down your questions so you remember to ask them during your visits.

## 2021-07-15 NOTE — ED CDU PROVIDER DISPOSITION NOTE - CLINICAL COURSE
placed on observation for extended eval of dizziness.  Symptoms completely resolved.  MRI completed and negative for acute stroke.  Pt to follow-up with dr wagner for management of carotid stenosis

## 2021-07-15 NOTE — ED CDU PROVIDER SUBSEQUENT DAY NOTE - PROGRESS NOTE DETAILS
Pt received during signout; pt evaluated at bedside with Dr. Aviles  Pt reports yesterday she was "so dizzy she was almost vomiting," states she thinks it was because she was overwhelmed, she hadn't eaten and her store was hot. Denies difficulty walking, speaking, numbness, acute visual changes. Pt denies any acute complaints at this time, states she is feeling better and wants to go home. Able to ambulate without assistance Pt's cardiologist Dr. Chisholm made aware of MRI results and that she is okay for d/c, he will follow up in regards to cardiac clearance for procedure Seen on morning rounds.  reports complete resolution of dizziness.  Denies accompanying facial numbness, visual field deficit, slurred speech, arm/leg weakness/ numbness.  MR completed and negative for acute stroke.  Case d/w dr Chisholm and patient can follow-up with Dr Rosario for scheduling of CEA. MRI no acute stroke. Will d/c with outpatient follow up  Pt stable for d/c, reports improvement, VSS, tolerating PO, ambulatory.  Discussion includes results, plan, and return precautions. Pt advised to f/u with PMD 1-2 days and specialists discussed.  Printed copies of available lab/radiology results contained within discharge packet. Pt verbalized understanding/agreement of plan.

## 2021-07-15 NOTE — ED CDU PROVIDER SUBSEQUENT DAY NOTE - ATTENDING CONTRIBUTION TO CARE
76 yo female presenting with acute dizziness but normal examination. I personally saw the patient with the PA, and completed the key components of the history and physical exam. I then discussed the management plan with the PA.

## 2021-07-15 NOTE — ED CDU PROVIDER DISPOSITION NOTE - PROVIDER TOKENS
PROVIDER:[TOKEN:[[id number]],FOLLOWUP:[1-3 Days]],PROVIDER:[TOKEN:[[id number]],FOLLOWUP:[4-6 Days]],PROVIDER:[TOKEN:[[id number]],FOLLOWUP:[1-3 Days]]

## 2021-07-15 NOTE — ED CDU PROVIDER DISPOSITION NOTE - CARE PROVIDER_API CALL
Sedrick Seay)  Otolaryngology  [address] [place] [address]  Phone: [phone]  Fax: [phone]  Follow Up Time: 1-3 Days    Kevin Ashraf; PhD)  Neurology; Vascular Neurology  [address], [address]  [place] [address]  Phone: [phone]  Fax: [phone]  Follow Up Time: 4-6 Days    Micah Chisholm)  Cardiovascular Disease  [address] [facility], [address] [place] [address]  Phone: [phone]  Fax: [phone]  Follow Up Time: 1-3 Days

## 2021-07-15 NOTE — ED CDU PROVIDER DISPOSITION NOTE - CARE PROVIDERS DIRECT ADDRESSES
,DirectAddress_Unknown,[email or web address]

## 2021-07-15 NOTE — ED CDU PROVIDER SUBSEQUENT DAY NOTE - HISTORY
Pt with recurrent dizziness, had episode yesterday around 2 pm while sewing, improved with meclizine.  Has history of carotid stenosis of the right carotid artery. Scheduled to have endarterectomy with Dr. Rosario as outpatient, pending, mri, MRi form completed and faxed to MRI

## 2021-07-15 NOTE — ED CDU PROVIDER DISPOSITION NOTE - PATIENT PORTAL LINK FT
You can access the FollowMyHealth Patient Portal offered by [facility] by registering at the following website: [email or web address]. By joining [employer]’s FollowMyHealth portal, you will also be able to view your health information using other applications (apps) compatible with our system.

## 2021-07-15 NOTE — ED CDU PROVIDER DISPOSITION NOTE - ATTENDING CONTRIBUTION TO CARE
Placed on observation for extended eval of dizziness: symptoms resolved.  MR imaging negative for acute stroke.  Pt to follow-up with dr wagner for CEA planning.

## 2021-07-16 LAB
CULTURE RESULTS: SIGNIFICANT CHANGE UP
SPECIMEN SOURCE: SIGNIFICANT CHANGE UP

## 2021-07-19 NOTE — PHYSICAL EXAM
[No Acute Distress] : no acute distress [Well-Appearing] : well-appearing [Normal Sclera/Conjunctiva] : normal sclera/conjunctiva [No JVD] : no jugular venous distention [No Respiratory Distress] : no respiratory distress  [No Accessory Muscle Use] : no accessory muscle use [Clear to Auscultation] : lungs were clear to auscultation bilaterally [Normal Rate] : normal rate  [Regular Rhythm] : with a regular rhythm [No Carotid Bruits] : no carotid bruits [No Edema] : there was no peripheral edema [Soft] : abdomen soft [Non Tender] : non-tender [Non-distended] : non-distended [No Masses] : no abdominal mass palpated [No HSM] : no HSM [No Focal Deficits] : no focal deficits [Normal Affect] : the affect was normal

## 2021-07-20 ENCOUNTER — APPOINTMENT (OUTPATIENT)
Dept: INTERNAL MEDICINE | Facility: CLINIC | Age: 76
End: 2021-07-20
Payer: MEDICARE

## 2021-07-20 VITALS
DIASTOLIC BLOOD PRESSURE: 78 MMHG | SYSTOLIC BLOOD PRESSURE: 128 MMHG | WEIGHT: 180 LBS | RESPIRATION RATE: 16 BRPM | HEART RATE: 84 BPM | HEIGHT: 60 IN | BODY MASS INDEX: 35.34 KG/M2 | OXYGEN SATURATION: 98 % | TEMPERATURE: 97.2 F

## 2021-07-20 DIAGNOSIS — Z87.81 PERSONAL HISTORY OF (HEALED) TRAUMATIC FRACTURE: ICD-10-CM

## 2021-07-20 DIAGNOSIS — R19.7 DIARRHEA, UNSPECIFIED: ICD-10-CM

## 2021-07-20 DIAGNOSIS — R79.89 OTHER SPECIFIED ABNORMAL FINDINGS OF BLOOD CHEMISTRY: ICD-10-CM

## 2021-07-20 DIAGNOSIS — Z87.898 PERSONAL HISTORY OF OTHER SPECIFIED CONDITIONS: ICD-10-CM

## 2021-07-20 DIAGNOSIS — Z11.59 ENCOUNTER FOR SCREENING FOR OTHER VIRAL DISEASES: ICD-10-CM

## 2021-07-20 DIAGNOSIS — Z92.29 PERSONAL HISTORY OF OTHER DRUG THERAPY: ICD-10-CM

## 2021-07-20 DIAGNOSIS — Z87.39 PERSONAL HISTORY OF OTHER DISEASES OF THE MUSCULOSKELETAL SYSTEM AND CONNECTIVE TISSUE: ICD-10-CM

## 2021-07-20 DIAGNOSIS — I48.0 PAROXYSMAL ATRIAL FIBRILLATION: ICD-10-CM

## 2021-07-20 DIAGNOSIS — S86.912A STRAIN OF UNSPECIFIED MUSCLE(S) AND TENDON(S) AT LOWER LEG LEVEL, LEFT LEG, INITIAL ENCOUNTER: ICD-10-CM

## 2021-07-20 DIAGNOSIS — R14.0 ABDOMINAL DISTENSION (GASEOUS): ICD-10-CM

## 2021-07-20 DIAGNOSIS — I34.0 NONRHEUMATIC MITRAL (VALVE) INSUFFICIENCY: ICD-10-CM

## 2021-07-20 DIAGNOSIS — R51.9 HEADACHE, UNSPECIFIED: ICD-10-CM

## 2021-07-20 DIAGNOSIS — K59.1 FUNCTIONAL DIARRHEA: ICD-10-CM

## 2021-07-20 DIAGNOSIS — M54.2 CERVICALGIA: ICD-10-CM

## 2021-07-20 PROCEDURE — 99214 OFFICE O/P EST MOD 30 MIN: CPT

## 2021-07-20 NOTE — ASSESSMENT
[Patient Optimized for Surgery] : Patient optimized for surgery [No Further Testing Recommended] : no further testing recommended [Continue anti-platelet treatment as is] : Continue current anti-platelet treatment [Modify medications prior to procedure] : Modify medications prior to procedure [FreeTextEntry4] : 75-year-old female currently medically stable with no contraindication to planned surgical procedure.\par The patient is on aspirin daily and told to discuss with surgery about stopping it or not.\par Patient also told to stop all supplements.\par Status post preoperative cardiac evaluation [FreeTextEntry7] : Discontinue all supplements [FreeTextEntry6] : per surgery

## 2021-07-20 NOTE — HISTORY OF PRESENT ILLNESS
[Aortic Stenosis] : aortic stenosis [Atrial Fibrillation] : atrial fibrillation [No Pertinent Pulmonary History] : no history of asthma, COPD, sleep apnea, or smoking [No Adverse Anesthesia Reaction] : no adverse anesthesia reaction in self or family member [(Patient denies any chest pain, claudication, dyspnea on exertion, orthopnea, palpitations or syncope)] : Patient denies any chest pain, claudication, dyspnea on exertion, orthopnea, palpitations or syncope [Good (7-10 METs)] : Good (7-10 METs) [Coronary Artery Disease] : no coronary artery disease [Recent Myocardial Infarction] : no recent myocardial infarction [Implantable Device/Pacemaker] : no implantable device/pacemaker [Chronic Kidney Disease] : no chronic kidney disease [Chronic Anticoagulation] : no chronic anticoagulation [Diabetes] : no diabetes [FreeTextEntry1] : Right carotid endarterectomy [FreeTextEntry2] : July 2021 [FreeTextEntry3] : Dr Rosario [FreeTextEntry4] : 75-year-old female presents for medical evaluation prior to right carotid endarterectomy.\par \par Patient's medical history includes hyperlipidemia for which he takes rosuvastatin, PAF status post ablation in 2014 to normal sinus rhythm, 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 [FreeTextEntry7] : Echo July 2021 with normal LVEF with mild MR/AS

## 2021-07-20 NOTE — RESULTS/DATA
[] : results reviewed [de-identified] : WNL [de-identified] : WNL [de-identified] : WNL [de-identified] : per cardiology

## 2021-08-02 ENCOUNTER — NON-APPOINTMENT (OUTPATIENT)
Age: 76
End: 2021-08-02

## 2021-08-04 ENCOUNTER — NON-APPOINTMENT (OUTPATIENT)
Age: 76
End: 2021-08-04

## 2021-09-27 ENCOUNTER — APPOINTMENT (OUTPATIENT)
Dept: INTERNAL MEDICINE | Facility: CLINIC | Age: 76
End: 2021-09-27
Payer: MEDICARE

## 2021-09-27 VITALS
DIASTOLIC BLOOD PRESSURE: 78 MMHG | HEART RATE: 73 BPM | RESPIRATION RATE: 18 BRPM | BODY MASS INDEX: 34.95 KG/M2 | SYSTOLIC BLOOD PRESSURE: 138 MMHG | WEIGHT: 178 LBS | HEIGHT: 60 IN | TEMPERATURE: 97.5 F

## 2021-09-27 DIAGNOSIS — Z01.30 ENCOUNTER FOR EXAMINATION OF BLOOD PRESSURE W/OUT ABNORMAL FINDINGS: ICD-10-CM

## 2021-09-27 PROCEDURE — 99213 OFFICE O/P EST LOW 20 MIN: CPT

## 2021-09-27 NOTE — PHYSICAL EXAM
[No Acute Distress] : no acute distress [Well-Appearing] : well-appearing [No Respiratory Distress] : no respiratory distress  [No Accessory Muscle Use] : no accessory muscle use [Clear to Auscultation] : lungs were clear to auscultation bilaterally [Normal Rate] : normal rate  [Regular Rhythm] : with a regular rhythm [No Focal Deficits] : no focal deficits [No Edema] : there was no peripheral edema [Normal Affect] : the affect was normal

## 2021-09-27 NOTE — HISTORY OF PRESENT ILLNESS
[FreeTextEntry8] : The patient presents for an acute visit secondary to being told to get a blood pressure check.\par The patient is status post right carotid endarterectomy July 27, 2021 which went well without any preoperative or postoperative complications.\par The patient saw the vascular surgeon Dr. Yareli bardales and was told that her blood pressure was on the high side and should get checked.\par The patient checks her blood pressure at home with her wrist monitor and gets 120-140/70-80.\par No chest pain, shortness of breath, palpitations or headache.

## 2021-09-27 NOTE — ASSESSMENT
[FreeTextEntry1] : Patient currently with fairly good blood pressure therefore no elevation nor need for any intervention.\par Patient reassured and told to continue to monitor her blood pressure with her home machine and if any issues to call.\par \par Patient is status post right carotid endarterectomy and is doing well\par \par Patient schedule for yearly physical in about one month.

## 2021-10-12 ENCOUNTER — RX RENEWAL (OUTPATIENT)
Age: 76
End: 2021-10-12

## 2021-10-20 ENCOUNTER — LABORATORY RESULT (OUTPATIENT)
Age: 76
End: 2021-10-20

## 2021-10-20 ENCOUNTER — APPOINTMENT (OUTPATIENT)
Dept: INTERNAL MEDICINE | Facility: CLINIC | Age: 76
End: 2021-10-20
Payer: MEDICARE

## 2021-10-20 VITALS
TEMPERATURE: 97 F | HEIGHT: 60 IN | DIASTOLIC BLOOD PRESSURE: 70 MMHG | HEART RATE: 76 BPM | BODY MASS INDEX: 35.14 KG/M2 | SYSTOLIC BLOOD PRESSURE: 120 MMHG | WEIGHT: 179 LBS | RESPIRATION RATE: 16 BRPM

## 2021-10-20 DIAGNOSIS — K74.60 UNSPECIFIED CIRRHOSIS OF LIVER: ICD-10-CM

## 2021-10-20 DIAGNOSIS — K52.832 LYMPHOCYTIC COLITIS: ICD-10-CM

## 2021-10-20 DIAGNOSIS — D75.89 OTHER SPECIFIED DISEASES OF BLOOD AND BLOOD-FORMING ORGANS: ICD-10-CM

## 2021-10-20 DIAGNOSIS — Z23 ENCOUNTER FOR IMMUNIZATION: ICD-10-CM

## 2021-10-20 DIAGNOSIS — G35 MULTIPLE SCLEROSIS: ICD-10-CM

## 2021-10-20 DIAGNOSIS — I65.21 OCCLUSION AND STENOSIS OF RIGHT CAROTID ARTERY: ICD-10-CM

## 2021-10-20 PROCEDURE — 36415 COLL VENOUS BLD VENIPUNCTURE: CPT

## 2021-10-20 PROCEDURE — G0439: CPT

## 2021-10-20 NOTE — COUNSELING
[Healthy eating counseling provided] : healthy eating [Low Fat Diet] : Low fat diet [Decrease Portions] : Decrease food portions [Walking] : Walking [None] : None [Needs reinforcement, provided] : Patient needs reinforcement on understanding lifestyle changes and  the steps needed to achieve self management goals and reinforcement was provided [Potential consequences of obesity discussed] : Potential consequences of obesity discussed [Benefits of weight loss discussed] : Benefits of weight loss discussed [Encouraged to increase physical activity] : Encouraged to increase physical activity [de-identified] : The patient again was told she should totally abstain from alcohol

## 2021-10-20 NOTE — PHYSICAL EXAM
[General Appearance - Alert] : alert [Sclera] : the sclera and conjunctiva were normal [General Appearance - In No Acute Distress] : in no acute distress [PERRL With Normal Accommodation] : pupils were equal in size, round, and reactive to light [Extraocular Movements] : extraocular movements were intact [Outer Ear] : the ears and nose were normal in appearance [Oropharynx] : the oropharynx was normal [Neck Appearance] : the appearance of the neck was normal [Neck Cervical Mass (___cm)] : no neck mass was observed [Jugular Venous Distention Increased] : there was no jugular-venous distention [Thyroid Diffuse Enlargement] : the thyroid was not enlarged [Thyroid Nodule] : there were no palpable thyroid nodules [Auscultation Breath Sounds / Voice Sounds] : lungs were clear to auscultation bilaterally [Heart Rate And Rhythm] : heart rate was normal and rhythm regular [Heart Sounds] : normal S1 and S2 [Heart Sounds Gallop] : no gallops [Murmurs] : no murmurs [Heart Sounds Pericardial Friction Rub] : no pericardial rub [Arterial Pulses Carotid] : carotid pulses were normal with no bruits [Abdominal Aorta] : the abdominal aorta was normal [Arterial Pulses Femoral] : femoral pulses were normal without bruits [Full Pulse] : the pedal pulses are present [Edema] : there was no peripheral edema [Veins - Varicosity Changes] : there were no varicosital changes [Bowel Sounds] : normal bowel sounds [Abdomen Soft] : soft [Abdomen Tenderness] : non-tender [Abdomen Mass (___ Cm)] : no abdominal mass palpated [Cervical Lymph Nodes Enlarged Posterior Bilaterally] : posterior cervical [Cervical Lymph Nodes Enlarged Anterior Bilaterally] : anterior cervical [Supraclavicular Lymph Nodes Enlarged Bilaterally] : supraclavicular [Axillary Lymph Nodes Enlarged Bilaterally] : axillary [Femoral Lymph Nodes Enlarged Bilaterally] : femoral [Inguinal Lymph Nodes Enlarged Bilaterally] : inguinal [No Spinal Tenderness] : no spinal tenderness [Abnormal Walk] : normal gait [Nail Clubbing] : no clubbing  or cyanosis of the fingernails [Musculoskeletal - Swelling] : no joint swelling seen [Motor Tone] : muscle strength and tone were normal [Skin Color & Pigmentation] : normal skin color and pigmentation [Skin Turgor] : normal skin turgor [] : no rash [Cranial Nerves] : cranial nerves 2-12 were intact [No Focal Deficits] : no focal deficits [Oriented To Time, Place, And Person] : oriented to person, place, and time [Impaired Insight] : insight and judgment were intact [Affect] : the affect was normal [FreeTextEntry1] : Obese

## 2021-10-20 NOTE — HISTORY OF PRESENT ILLNESS
[FreeTextEntry1] : 75-year-old female presents for her yearly physical with history of atrial fibrillation for which she had ablation 2014 with cardiology followup and remains in sinus rhythm. For this she continues on an aspirin daily.\par \par Significant issue is December 2017 she was admitted and found to have acute hepatitis felt secondary to drug-induced liver injury secondary to Lipitor.\par Evaluation was done including MRIs which showed cirrhosis which was felt to be due to patient's chronic alcohol intake.\par Patient's liver functions have gone back to normal.\par She was following up with hepatology but has not seen them in 2 years\par Against medical advice the patient continues to drink alcohol stating she drinks about 4 times a week. She knows she he should not be drinking at all.\par \par The patient had known right carotid stenosis which ad progressed and she had a right carotid endarterectomy July 27, 2021 which went well\par \par Patient with hyperlipidemia on Crestor.\par \par She did have a right total knee replacement October 2014 which went fairly well but still has occasional discomfort with a known Baker's cyst.\par \par The patient has seen neurology in the past stating that she had multiple sclerosis the patient has had no progression of symptoms and the diagnosis is in question\par \par Otherwise she is feeling relatively well without chest pain, palpitations or shortness of breath.\par Her weight is about the same without any regular exercise regimen.\par \par The patient had issues with bowel changes and diarrhea and had GI workup with diagnosis of lymphocytic colitis. She follows with GI periodically\par \par The patient has anxiety and depression and was on Zoloft. the patient states that she stopped it about 2 weeks ago without asking my advice and for the first few days had symptoms of potential withdrawal but is doing fine now. 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

## 2021-10-20 NOTE — ASSESSMENT
[FreeTextEntry1] : 75-year-old female with good general health with continued need for lifestyle changes with diet exercise and weight loss.\par A. fib with status post ablation and clinically remains in sinus rhythm.\par \par Patient status post acute hepatitis secondary to drug-induced liver injury likely secondary to Lipitor with return to normal liver functions.She was currently on rosuvastatin without issues.\par \par Workup showed the patient to have liver cirrhosis likely secondary to chronic alcohol intake which was daily. \par the patient has been told on multiple occasions that she should be totally abstinent from drinking alcohol but continues to drink about 4 days a week. Again stressed the need that she needs to avoid alcohol.\par Referral given to followup with hepatology\par \par GI issues secondary to lymphocytic colitis which is relatively stable\par \par Significant right carotid stenosis which progressed and therefore patient had a right carotid endarterectomy July 27, 2021 with success.\par \par Patient with chronic anxiety and depression previously on Zoloft. Currently she feels she needs no treatment.\par \par Followup with cardiology and hepatology as scheduled\par \par Bone density January 2018\par Mammography June 2021\par Colonoscopy March 2020\par Endoscopy January 2018\par GYN yearly\par \par Vaccines up to date (including hepatitis A and hepatitis B) other than shingles vaccine.\par received the influenza and COVID vaccines\par \par Venipuncture done today in the office\par \par Followup in 6 months\par \par

## 2021-10-20 NOTE — HEALTH RISK ASSESSMENT
[Good] : ~his/her~ current health as good [Yes] : Yes [No] : In the past 12 months have you used drugs other than those required for medical reasons? No [No falls in past year] : Patient reported no falls in the past year [0] : 2) Feeling down, depressed, or hopeless: Not at all (0) [None] : None [With Significant Other] : lives with significant other [# of Members in Household ___] :  household currently consist of [unfilled] member(s) [Employed] : employed [High School] : high school [] :  [# Of Children ___] : has [unfilled] children [Sexually Active] : sexually active [Feels Safe at Home] : Feels safe at home [Fully functional (bathing, dressing, toileting, transferring, walking, feeding)] : Fully functional (bathing, dressing, toileting, transferring, walking, feeding) [Fully functional (using the telephone, shopping, preparing meals, housekeeping, doing laundry, using] : Fully functional and needs no help or supervision to perform IADLs (using the telephone, shopping, preparing meals, housekeeping, doing laundry, using transportation, managing medications and managing finances) [Reports changes in hearing] : Reports changes in hearing [Smoke Detector] : smoke detector [Carbon Monoxide Detector] : carbon monoxide detector [Seat Belt] :  uses seat belt [Sunscreen] : uses sunscreen [Discussed at today's visit] : Advance Directives Discussed at today's visit [Designated Healthcare Proxy] : Designated healthcare proxy [Very Good] : ~his/her~  mood as very good [Reviewed no changes] : Reviewed, no changes [Name: ___] : Health Care Proxy's Name: [unfilled]  [] : No [Audit-CScore] : 0 [de-identified] : active [de-identified] : good [QSB3Blzvm] : 0 [Change in mental status noted] : No change in mental status noted [Reports changes in vision] : Reports no changes in vision [Reports changes in dental health] : Reports no changes in dental health [Guns at Home] : no guns at home [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

## 2021-10-21 ENCOUNTER — NON-APPOINTMENT (OUTPATIENT)
Age: 76
End: 2021-10-21

## 2021-10-21 LAB
ALBUMIN SERPL ELPH-MCNC: 4.3 G/DL
ALP BLD-CCNC: 74 U/L
ALT SERPL-CCNC: 19 U/L
ANION GAP SERPL CALC-SCNC: 13 MMOL/L
APPEARANCE: ABNORMAL
AST SERPL-CCNC: 21 U/L
BACTERIA: NEGATIVE
BASOPHILS # BLD AUTO: 0.08 K/UL
BASOPHILS NFR BLD AUTO: 1.2 %
BILIRUB SERPL-MCNC: 0.6 MG/DL
BILIRUBIN URINE: NEGATIVE
BLOOD URINE: NEGATIVE
BUN SERPL-MCNC: 21 MG/DL
CALCIUM SERPL-MCNC: 9.9 MG/DL
CHLORIDE SERPL-SCNC: 105 MMOL/L
CHOLEST SERPL-MCNC: 215 MG/DL
CO2 SERPL-SCNC: 23 MMOL/L
COLOR: NORMAL
CREAT SERPL-MCNC: 0.71 MG/DL
EOSINOPHIL # BLD AUTO: 0.27 K/UL
EOSINOPHIL NFR BLD AUTO: 4.1 %
ESTIMATED AVERAGE GLUCOSE: 111 MG/DL
FOLATE SERPL-MCNC: 18.3 NG/ML
GLUCOSE QUALITATIVE U: NEGATIVE
GLUCOSE SERPL-MCNC: 90 MG/DL
HBA1C MFR BLD HPLC: 5.5 %
HCT VFR BLD CALC: 44.2 %
HDLC SERPL-MCNC: 62 MG/DL
HGB BLD-MCNC: 13.9 G/DL
HYALINE CASTS: 0 /LPF
IMM GRANULOCYTES NFR BLD AUTO: 0.2 %
KETONES URINE: NEGATIVE
LDLC SERPL CALC-MCNC: 132 MG/DL
LEUKOCYTE ESTERASE URINE: NEGATIVE
LYMPHOCYTES # BLD AUTO: 1.79 K/UL
LYMPHOCYTES NFR BLD AUTO: 27 %
MAN DIFF?: NORMAL
MCHC RBC-ENTMCNC: 31.4 GM/DL
MCHC RBC-ENTMCNC: 33.1 PG
MCV RBC AUTO: 105.2 FL
MICROSCOPIC-UA: NORMAL
MONOCYTES # BLD AUTO: 0.74 K/UL
MONOCYTES NFR BLD AUTO: 11.2 %
NEUTROPHILS # BLD AUTO: 3.74 K/UL
NEUTROPHILS NFR BLD AUTO: 56.3 %
NITRITE URINE: NEGATIVE
NONHDLC SERPL-MCNC: 153 MG/DL
PH URINE: 5.5
PLATELET # BLD AUTO: 334 K/UL
POTASSIUM SERPL-SCNC: 4.3 MMOL/L
PROT SERPL-MCNC: 6.9 G/DL
PROTEIN URINE: NEGATIVE
RBC # BLD: 4.2 M/UL
RBC # FLD: 14.4 %
RED BLOOD CELLS URINE: 2 /HPF
SODIUM SERPL-SCNC: 142 MMOL/L
SPECIFIC GRAVITY URINE: 1.01
SQUAMOUS EPITHELIAL CELLS: 1 /HPF
TRIGL SERPL-MCNC: 106 MG/DL
TSH SERPL-ACNC: 2.22 UIU/ML
UROBILINOGEN URINE: NORMAL
VIT B12 SERPL-MCNC: >2000 PG/ML
WBC # FLD AUTO: 6.63 K/UL
WHITE BLOOD CELLS URINE: 1 /HPF

## 2021-11-09 ENCOUNTER — APPOINTMENT (OUTPATIENT)
Dept: HEPATOLOGY | Facility: CLINIC | Age: 76
End: 2021-11-09

## 2021-12-09 ENCOUNTER — APPOINTMENT (OUTPATIENT)
Dept: CARDIOLOGY | Facility: CLINIC | Age: 76
End: 2021-12-09
Payer: MEDICARE

## 2021-12-09 ENCOUNTER — NON-APPOINTMENT (OUTPATIENT)
Age: 76
End: 2021-12-09

## 2021-12-09 VITALS
DIASTOLIC BLOOD PRESSURE: 74 MMHG | HEIGHT: 60 IN | TEMPERATURE: 98.3 F | OXYGEN SATURATION: 96 % | HEART RATE: 75 BPM | WEIGHT: 181 LBS | SYSTOLIC BLOOD PRESSURE: 136 MMHG | BODY MASS INDEX: 35.53 KG/M2

## 2021-12-09 DIAGNOSIS — R94.31 ABNORMAL ELECTROCARDIOGRAM [ECG] [EKG]: ICD-10-CM

## 2021-12-09 DIAGNOSIS — R06.02 SHORTNESS OF BREATH: ICD-10-CM

## 2021-12-09 PROCEDURE — 93000 ELECTROCARDIOGRAM COMPLETE: CPT

## 2021-12-09 PROCEDURE — 99214 OFFICE O/P EST MOD 30 MIN: CPT

## 2021-12-12 PROBLEM — R94.31 ABNORMAL EKG: Status: ACTIVE | Noted: 2019-07-25

## 2021-12-12 PROBLEM — R06.02 SHORTNESS OF BREATH ON EXERTION: Status: ACTIVE | Noted: 2021-07-12

## 2021-12-12 NOTE — HISTORY OF PRESENT ILLNESS
[FreeTextEntry1] : 76-year-old female who is here with paroxysmal atrial fibrillation status post ablation in 2014, hyperlipidemia, carotid stenosis status post CEA of the right carotid in June 25, 2021.\par Patient also has aortic stenosis which is mild on echocardiogram performed this year.\par She is under a lot of stress with her  being sick\par no cp but unable to walk as much as she did a few months ago due to sob. \par No palpitation, syncope or presyncope.\par \par EKG with normal sinus rhythm heart rate of 72 bpm normal axis and intervals no ST-T changes.\par

## 2021-12-12 NOTE — REVIEW OF SYSTEMS
[Weight Gain (___ Lbs)] : [unfilled] ~Ulb weight gain [Dyspnea on exertion] : dyspnea during exertion [Chest Discomfort] : no chest discomfort [Orthopnea] : no orthopnea [Cough] : no cough [Abdominal Pain] : no abdominal pain [Nausea] : no nausea [Change in Appetite] : no change in appetite [Dizziness] : no dizziness [Convulsions] : no convulsions [Confusion] : no confusion was observed

## 2021-12-12 NOTE — PHYSICAL EXAM
[Normal S1, S2] : normal S1, S2 [Normal] : clear lung fields, good air entry, no respiratory distress [Soft] : abdomen soft [Non Tender] : non-tender [Moves all extremities] : moves all extremities [Alert and Oriented] : alert and oriented [Normal memory] : normal memory [de-identified] : 3/6 sm right second ics. distal pulses are 1+ b/l.

## 2021-12-12 NOTE — ASSESSMENT
[FreeTextEntry1] : Under a lot of stress with her  being sick.  Denies having any chest pain but gets short of breath with exertion and is unable to walk like she used to before.\par Exam unchanged with mild aortic stenosis\par She feels lightheaded at times not positional her blood pressure today is normal.  Advised to see Dr. Rosario her vascular surgeon.  I also called him to discuss this with him and he will return my call.\par Due to shortness of breath nuclear stress test is ordered.\par EKG is unchanged.\par On exam degree of aortic is stenosis unchanged.\par She also has lower extremity pulses that are weaker than they should be and she will see Dr. Rosario for that purpose as well\par

## 2021-12-16 ENCOUNTER — APPOINTMENT (OUTPATIENT)
Dept: CARDIOLOGY | Facility: CLINIC | Age: 76
End: 2021-12-16
Payer: MEDICARE

## 2021-12-16 PROCEDURE — A9500: CPT

## 2021-12-16 PROCEDURE — 93015 CV STRESS TEST SUPVJ I&R: CPT

## 2021-12-16 PROCEDURE — 78452 HT MUSCLE IMAGE SPECT MULT: CPT

## 2022-01-06 ENCOUNTER — APPOINTMENT (OUTPATIENT)
Dept: INTERNAL MEDICINE | Facility: CLINIC | Age: 77
End: 2022-01-06
Payer: MEDICARE

## 2022-01-06 DIAGNOSIS — G47.00 INSOMNIA, UNSPECIFIED: ICD-10-CM

## 2022-01-06 PROCEDURE — 99441: CPT

## 2022-01-10 ENCOUNTER — NON-APPOINTMENT (OUTPATIENT)
Age: 77
End: 2022-01-10

## 2022-04-07 ENCOUNTER — RX RENEWAL (OUTPATIENT)
Age: 77
End: 2022-04-07

## 2022-04-11 PROBLEM — Z11.59 SCREENING FOR VIRAL DISEASE: Status: RESOLVED | Noted: 2020-09-01 | Resolved: 2021-07-20

## 2022-04-15 ENCOUNTER — RX RENEWAL (OUTPATIENT)
Age: 77
End: 2022-04-15

## 2022-04-20 ENCOUNTER — APPOINTMENT (OUTPATIENT)
Dept: INTERNAL MEDICINE | Facility: CLINIC | Age: 77
End: 2022-04-20
Payer: MEDICARE

## 2022-04-20 ENCOUNTER — LABORATORY RESULT (OUTPATIENT)
Age: 77
End: 2022-04-20

## 2022-04-20 VITALS
TEMPERATURE: 97 F | BODY MASS INDEX: 35.14 KG/M2 | SYSTOLIC BLOOD PRESSURE: 120 MMHG | RESPIRATION RATE: 13 BRPM | DIASTOLIC BLOOD PRESSURE: 80 MMHG | OXYGEN SATURATION: 98 % | HEART RATE: 94 BPM | HEIGHT: 60 IN | WEIGHT: 179 LBS

## 2022-04-20 DIAGNOSIS — F41.9 ANXIETY DISORDER, UNSPECIFIED: ICD-10-CM

## 2022-04-20 PROCEDURE — 36415 COLL VENOUS BLD VENIPUNCTURE: CPT

## 2022-04-20 PROCEDURE — 99214 OFFICE O/P EST MOD 30 MIN: CPT | Mod: 25

## 2022-04-20 NOTE — ASSESSMENT
[FreeTextEntry1] : Patient started on Zoloft 25 mg daily, she had benefit and no side effects with that in the past. Patient told to continue xanax p.r.n.Venipuncture done in our office, Labs sent out and further recommendations will be made based on lab results. Patient advised to continue present medications with diet/exercise and specialists followup.RTO in oct for CP/1month on new medication\par \par \par Dr. Jefferson was present in office building while I examined patient\par \par Discussed shingles vaccine, +got covid vaccines X3 (?date of #3 )\par Bone density-7/2021\par Mammogram-6/2021\par Colonoscopy 2/2018\par Endoscopy 1/18\par Specialists include\par 1. GI-Dr. Lang\par 2. Cardiology-Dr. Brooke\par 3. GYN-Dr. Zuñiga=has not been in years\par 4. Ophthalmology-Dr. Eric anderson\par 5. Orthopedic p.r.n.-Dr. Read/Dr. Thompson\par 6. Neurology-Dr. Ashraf\par 7. Vascular-Abraham Granados\par 8. Hepatologist-  "to do"\par Carotid sonogram via vascular\par Hepatitis C screening in the past was negative, declines HIV screening\par CT lung=N/A\par Abd sono 3/2020=normal\par echo 7/2021\par stress test 12/2021

## 2022-04-20 NOTE — HISTORY OF PRESENT ILLNESS
[FreeTextEntry1] : Obesity/PAF/hyperlipidemia [de-identified] : Patient presents for followup on hyperlipidemia/obesity/PAF. Patient is currently fasting for today's labs. Patient is currently on crestor for hyperlipidemia, on aspirin for PAF and has not made any lifestyle changes as of yet to improve her obesity. \par -got covid vaccines X3\par - Patient is having difficulty coping with her 's medical illnesses, he is in the [facility] at [place] and she is concerned what will happen when he comes home and she has to care for him. Patient is anxious and sad, using Xanax p.r.n. Patient was on Zoloft in the past with benefit\par \par \par

## 2022-04-20 NOTE — PHYSICAL EXAM
[No Acute Distress] : no acute distress [No Respiratory Distress] : no respiratory distress  [Clear to Auscultation] : lungs were clear to auscultation bilaterally [Normal Rate] : normal rate  [Regular Rhythm] : with a regular rhythm [Normal Affect] : the affect was normal [Normal Mood] : the mood was normal [Normal Insight/Judgement] : insight and judgment were intact

## 2022-04-21 LAB
ALBUMIN SERPL ELPH-MCNC: 4.3 G/DL
ALP BLD-CCNC: 90 U/L
ALT SERPL-CCNC: 24 U/L
ANION GAP SERPL CALC-SCNC: 13 MMOL/L
AST SERPL-CCNC: 27 U/L
BASOPHILS # BLD AUTO: 0.05 K/UL
BASOPHILS NFR BLD AUTO: 0.9 %
BILIRUB SERPL-MCNC: 0.8 MG/DL
BUN SERPL-MCNC: 14 MG/DL
CALCIUM SERPL-MCNC: 9.6 MG/DL
CHLORIDE SERPL-SCNC: 105 MMOL/L
CHOLEST SERPL-MCNC: 168 MG/DL
CO2 SERPL-SCNC: 24 MMOL/L
CREAT SERPL-MCNC: 0.68 MG/DL
EGFR: 91 ML/MIN/1.73M2
EOSINOPHIL # BLD AUTO: 0.06 K/UL
EOSINOPHIL NFR BLD AUTO: 1.1 %
ESTIMATED AVERAGE GLUCOSE: 114 MG/DL
GLUCOSE SERPL-MCNC: 111 MG/DL
HBA1C MFR BLD HPLC: 5.6 %
HCT VFR BLD CALC: 44 %
HDLC SERPL-MCNC: 69 MG/DL
HGB BLD-MCNC: 13.8 G/DL
IMM GRANULOCYTES NFR BLD AUTO: 0.2 %
LDLC SERPL CALC-MCNC: 78 MG/DL
LYMPHOCYTES # BLD AUTO: 1.58 K/UL
LYMPHOCYTES NFR BLD AUTO: 27.9 %
MAN DIFF?: NORMAL
MCHC RBC-ENTMCNC: 31.4 GM/DL
MCHC RBC-ENTMCNC: 33.1 PG
MCV RBC AUTO: 105.5 FL
MONOCYTES # BLD AUTO: 0.63 K/UL
MONOCYTES NFR BLD AUTO: 11.1 %
NEUTROPHILS # BLD AUTO: 3.33 K/UL
NEUTROPHILS NFR BLD AUTO: 58.8 %
NONHDLC SERPL-MCNC: 99 MG/DL
PLATELET # BLD AUTO: 308 K/UL
POTASSIUM SERPL-SCNC: 4.1 MMOL/L
PROT SERPL-MCNC: 6.8 G/DL
RBC # BLD: 4.17 M/UL
RBC # FLD: 13.3 %
SODIUM SERPL-SCNC: 143 MMOL/L
TRIGL SERPL-MCNC: 108 MG/DL
TSH SERPL-ACNC: 1.62 UIU/ML
WBC # FLD AUTO: 5.66 K/UL

## 2022-04-22 ENCOUNTER — NON-APPOINTMENT (OUTPATIENT)
Age: 77
End: 2022-04-22

## 2022-06-16 ENCOUNTER — NON-APPOINTMENT (OUTPATIENT)
Age: 77
End: 2022-06-16

## 2022-06-27 ENCOUNTER — APPOINTMENT (OUTPATIENT)
Dept: CARDIOLOGY | Facility: CLINIC | Age: 77
End: 2022-06-27

## 2022-07-08 ENCOUNTER — RX RENEWAL (OUTPATIENT)
Age: 77
End: 2022-07-08

## 2022-08-05 ENCOUNTER — RX RENEWAL (OUTPATIENT)
Age: 77
End: 2022-08-05

## 2022-08-08 ENCOUNTER — RX RENEWAL (OUTPATIENT)
Age: 77
End: 2022-08-08

## 2022-09-06 ENCOUNTER — RX RENEWAL (OUTPATIENT)
Age: 77
End: 2022-09-06

## 2022-10-13 ENCOUNTER — NON-APPOINTMENT (OUTPATIENT)
Age: 77
End: 2022-10-13

## 2022-10-14 ENCOUNTER — RESULT REVIEW (OUTPATIENT)
Age: 77
End: 2022-10-14

## 2022-10-14 ENCOUNTER — EMERGENCY (EMERGENCY)
Facility: HOSPITAL | Age: 77
LOS: 1 days | Discharge: HOME | End: 2022-10-14
Attending: EMERGENCY MEDICINE
Payer: MEDICARE

## 2022-10-14 VITALS
HEIGHT: 62 IN | TEMPERATURE: 98 F | WEIGHT: 164.91 LBS | DIASTOLIC BLOOD PRESSURE: 85 MMHG | HEART RATE: 79 BPM | SYSTOLIC BLOOD PRESSURE: 150 MMHG | RESPIRATION RATE: 18 BRPM | OXYGEN SATURATION: 96 %

## 2022-10-14 DIAGNOSIS — Z98.89 OTHER SPECIFIED POSTPROCEDURAL STATES: Chronic | ICD-10-CM

## 2022-10-14 DIAGNOSIS — H26.40 UNSPECIFIED SECONDARY CATARACT: Chronic | ICD-10-CM

## 2022-10-14 PROCEDURE — 99284 EMERGENCY DEPT VISIT MOD MDM: CPT

## 2022-10-14 PROCEDURE — 25605 CLTX DST RDL FX/EPHYS SEP W/: CPT | Mod: RT

## 2022-10-14 PROCEDURE — 73110 X-RAY EXAM OF WRIST: CPT

## 2022-10-14 PROCEDURE — 99285 EMERGENCY DEPT VISIT HI MDM: CPT

## 2022-10-14 PROCEDURE — 73110 X-RAY EXAM OF WRIST: CPT | Mod: 26,RT

## 2022-10-14 RX ORDER — ACETAMINOPHEN 500 MG
650 TABLET ORAL ONCE
Refills: 0 | Status: COMPLETED | OUTPATIENT
Start: 2022-10-14 | End: 2022-10-14

## 2022-10-14 RX ADMIN — Medication 650 MILLIGRAM(S): at 21:37

## 2022-10-14 NOTE — CONSULT NOTE ADULT - SUBJECTIVE AND OBJECTIVE BOX
Pt Name: SUBHASH PORRAS    MRN: [id number]      Patient is a 76y Female presenting to the emergency department with a chief complaint of RIGHT wrist pain s/p Fall from van. Patient splinted at ED, but patient found with no splint on. Patient fell onto RIGHT hand. Pain is localized RIGHT wrist radiating to forearm. Patient is RIGHT hand dominant. Takes Aspirin 325mg once a day for afib. Patient denies LOC, HA, Numbness, tingling, N/V, HA or other orthopedic complaints.        REVIEW OF SYSTEMS    General: Alert, responsive, in NAD    Respiratory and Thorax: No difficulty breathing. No cough.  	   Cardiovascular:	No chest pain. No palpitations.    Genitourinary: No dysuria. No bleeding.    Musculoskeletal: SEE HPI.    Neurological: No sensory or motor changes.     Endocrine: No Hx of diabetes.    ROS is otherwise negative.      PAST MEDICAL & SURGICAL HISTORY:  PAST MEDICAL & SURGICAL HISTORY:  Osteoarthritis  ,A- fib in Jan 2014 and converted back to normal sinus by self and had a cardiac ablation done in april 2014.      ADARSH (obstructive sleep apnea)  does not use CPAP      Atrial fibrillation      Depression      S/P right knee arthroscopy  (2014),right  foot surgery (1999), B/L cataract surgey (2008),      S/P ablation of atrial fibrillation    After cataract, bilateral      Allergies: Percocet 10/325 (Vomiting)      Medications:     FAMILY HISTORY:  Family history of heart attack (Father)    : non-contributory  Social History:           Vital Signs Last 24 Hrs  T(C): 36.7 (14 Oct 2022 18:47), Max: 36.7 (14 Oct 2022 18:47)  T(F): 98.1 (14 Oct 2022 18:47), Max: 98.1 (14 Oct 2022 18:47)  HR: 79 (14 Oct 2022 18:47) (79 - 79)  BP: 150/85 (14 Oct 2022 18:47) (150/85 - 150/85)  BP(mean): --  RR: 18 (14 Oct 2022 18:47) (18 - 18)  SpO2: 96% (14 Oct 2022 18:47) (96% - 96%)    Parameters below as of 14 Oct 2022 18:47  Patient On (Oxygen Delivery Method): room air        Daily Height in cm: 157.48 (14 Oct 2022 18:47)    Daily       PHYSICAL EXAM:      Appearance: Alert, responsive, in no acute distress.    Neurological: Sensation is grossly intact to light touch ulna/radial/medial nerve distribution. No focal deficits or weaknesses found.    Skin: Skin is clean, dry and intact. No tenting. No bleeding. No abrasions. No ulcerations.    Vascular: 2+ Radial. Cap refill < 2 sec. No signs of venous insufficiency or stasis. No extremity ulcerations. No cyanosis.    Musculoskeletal:         RIGHT Upper Extremity: Obvious deformity to wrist/distal forearm. Unable to flex/extend wrist due to pain. Active ROM of digits, elbow flexion extension without pain elicited. Clavicle nontender to palpation. No other bony tenderness. Compartments soft, compressible, nontender. Denies Numbness, tingling, other orthopedic complaints.       LEFT Upper Extremity: Ecchymosis, TTP wrist. Active ROM of digits, wrist flexion extension, elbow flexion extension, shoulder abduction adduction without pain elicited. Clavicle nontender to palpation. No bony tenderness. Compartments soft, compressible, nontender. Denies Numbness, tingling, other orthopedic complaints.    FRACTURE REDUCTION  PROCEDURE NOTE: Fracture reduction     Performed by:  Prince Hyman PA-C    Indication: Acute fracture with displacement, requiring fracture reduction.    Consent: The risks and benefits of the procedure including incomplete reduction, nerve damage and bleeding were explained and the patient verbalized their understanding and wished to proceed with the procedure. Written consent was obtained following the discussion.    Universal Protocol: a time out was performed and the correct patient and site were verified     Procedure: Neurovascular exam intact prior to fracture reduction.  Skin exam : No bleeding or lacerations at the fracture site. Anesthesia/pain control, using aseptic technique, was administered using a hematoma block of 6 ml of 1% lidocaine. Reduction of the Right Distal radius fracture was accomplished via axial traction and careful manipulation. Following adequate reduction and alignment of the fractured bone, the fracture was immobilized with a plaster splint. Distally, the extremity was neurovascular intact following the procedure.  The patient tolerated the procedure well.    Post reduction films obtained and demonstrated an adequate reduction.    Complications: None      Imaging Studies:  RIGHT Distal Radius fracture on prelim read    A/P:  Pt is a  76y Female found to have LEFT/RIGHT distal radius Fracture    PLAN:   * D/w Dr. Rogers  * splint and sling applied  * maintain splint, keep dry  * Non weight bearing RUE  * Pain control  * f/u outpatient in 1 week with Dr. Rogers

## 2022-10-14 NOTE — ED ADULT NURSE NOTE - CAS TRG GENERAL NORM CIRC DET
Strong peripheral pulses Alert and oriented x 3, normal mood and affect, no apparent risk to self or others.

## 2022-10-14 NOTE — ED ADULT TRIAGE NOTE - CHIEF COMPLAINT QUOTE
pt states she fell & hurt right hand, + Fracture to right wrist, hand in splint  A&Ox3, resp wnl, pt states the swelling is hurting, sent by urgent care

## 2022-10-14 NOTE — ED ADULT NURSE NOTE - OBJECTIVE STATEMENT
pt is a 76y female pt aox4, ambulatory.  pt states she fell and now has pain to her right hand.  she currently has fx to right wrist and is wearing a splint.  pt reports swelling.  denies numbness, weakness, tingling.  no s/s acute distress noted.

## 2022-10-15 ENCOUNTER — NON-APPOINTMENT (OUTPATIENT)
Age: 77
End: 2022-10-15

## 2022-10-15 PROCEDURE — 73110 X-RAY EXAM OF WRIST: CPT | Mod: 26,RT

## 2022-10-15 RX ORDER — OXYCODONE HYDROCHLORIDE 5 MG/1
1 TABLET ORAL
Qty: 6 | Refills: 0
Start: 2022-10-15

## 2022-10-15 NOTE — ED PROVIDER NOTE - OBJECTIVE STATEMENT
77 y/o F presents c/o Right wrist pain/swelling s/p trip and fall this morning onto wrist. denies hittinghead /LOC. went to urgent care xrays done and pt advised to come to ER. was placed in a small splint and ace wrap. states she took it off and later reapplied it. felt swelling increased and had more tightness. denies numbness tingling weakness

## 2022-10-15 NOTE — ED PROVIDER NOTE - CLINICAL SUMMARY MEDICAL DECISION MAKING FREE TEXT BOX
Pt s/p trip and fall this morning, xrays done at urgent care available in PACS showing radius and ulna fractures  spoke w ortho PA - repeat xray obtained with some worsening displacement. ortho PA reduced and splinted pt and improved on post reduction film. stable for dc and outpatient follow up with ortho

## 2022-10-15 NOTE — ED PROVIDER NOTE - NSICDXPASTMEDICALHX_GEN_ALL_CORE_FT
PAST MEDICAL HISTORY:  Atrial fibrillation     Depression     ADARSH (obstructive sleep apnea) does not use CPAP    Osteoarthritis ,A- fib in Jan 2014 and converted back to normal sinus by self and had a cardiac ablation done in april 2014.

## 2022-10-15 NOTE — ED PROVIDER NOTE - ATTENDING APP SHARED VISIT CONTRIBUTION OF CARE
The patient seen and examined    Fracture of distal radius and ulna    I, Darshan Jain, performed the initial face to face bedside interview with this patient regarding history of present illness, review of symptoms and relevant past medical, social and family history.  I completed an independent physical examination.  I was the initial provider who evaluated this patient. I have signed out the follow up of any pending tests (i.e. labs, radiological studies) to the ACP.  I have communicated the patient’s plan of care and disposition with the ACP.

## 2022-10-15 NOTE — ED PROVIDER NOTE - PHYSICAL EXAMINATION
Gen: No acute distress, non toxic  HEENT: Mucous membranes moist, pink conjunctivae, EOMI  Neuro: A&O x 3, moving all 4 extremities  MSK: +swelling ecchymosis right wrist, limited ROM. full ROM hand/all digits. 2+ radial pulse, sensation intact  Skin: No rashes. intact and perfused.

## 2022-10-15 NOTE — ED PROVIDER NOTE - NS ED ATTENDING STATEMENT MOD
This was a shared visit with the KARI. I reviewed and verified the documentation and independently performed the documented:

## 2022-10-15 NOTE — ED PROVIDER NOTE - PATIENT PORTAL LINK FT
You can access the FollowMyHealth Patient Portal offered by [facility] by registering at the following website: [email or web address]. By joining [facility]’s FollowMyHealth portal, you will also be able to view your health information using other applications (apps) compatible with our system.

## 2022-10-15 NOTE — ED PROVIDER NOTE - NSFOLLOWUPINSTRUCTIONS_ED_ALL_ED_FT
Please take ibuprofen 600mg every 6 hours as needed for pain  Please take tylenol 650mg every 6hours as needed for pain  Follow up with orthopedist  Keep splint in place and dry  Return to ER for new or worsening symptoms    Fracture    A fracture is a break in one of your bones. This can occur from a variety of injuries, especially traumatic ones. Symptoms include pain, bruising, or swelling. Do not use the injured limb. If a fracture is in one of the bones below your waist, do not put weight on that limb unless instructed to do so by your healthcare provider. Crutches or a cane may have been provided. A splint or cast may have been applied by your health care provider. Make sure to keep it dry and follow up with an orthopedist as instructed.    SEEK IMMEDIATE MEDICAL CARE IF YOU HAVE ANY OF THE FOLLOWING SYMPTOMS: numbness, tingling, increasing pain, or weakness in any part of the injured limb.

## 2022-10-15 NOTE — ED PROVIDER NOTE - CARE PROVIDER_API CALL
Dillon Rogers)  Orthopedics  [address] [place] [id number]  Phone: [phone]  Fax: [phone]  Follow Up Time:

## 2022-10-15 NOTE — ED PROVIDER NOTE - NSICDXPASTSURGICALHX_GEN_ALL_CORE_FT
PAST SURGICAL HISTORY:  After cataract, bilateral     S/P ablation of atrial fibrillation     S/P right knee arthroscopy (2014),right  foot surgery (1999), B/L cataract surgey (2008),

## 2022-10-19 ENCOUNTER — APPOINTMENT (OUTPATIENT)
Dept: ORTHOPEDIC SURGERY | Facility: CLINIC | Age: 77
End: 2022-10-19

## 2022-10-19 VITALS
SYSTOLIC BLOOD PRESSURE: 167 MMHG | WEIGHT: 165 LBS | HEART RATE: 72 BPM | DIASTOLIC BLOOD PRESSURE: 89 MMHG | BODY MASS INDEX: 31.15 KG/M2 | HEIGHT: 61 IN

## 2022-10-19 DIAGNOSIS — S52.571D OTHER INTRAARTICULAR FRACTURE OF LOWER END OF RIGHT RADIUS, SUBSEQUENT ENCOUNTER FOR CLOSED FRACTURE WITH ROUTINE HEALING: ICD-10-CM

## 2022-10-19 PROCEDURE — 99214 OFFICE O/P EST MOD 30 MIN: CPT

## 2022-10-19 PROCEDURE — 73110 X-RAY EXAM OF WRIST: CPT | Mod: RT

## 2022-10-19 NOTE — HISTORY OF PRESENT ILLNESS
[FreeTextEntry1] : Ms. Paulson is here with her daughter who helps with history\par She comes to me with right wrist pain secondary to a fall this past weekend with significant swelling and bruising and pain for which she had to go to the emergency room and she was subsequently reduced and placed in a splint.\par She also complains of finger swelling

## 2022-10-19 NOTE — PHYSICAL EXAM
[de-identified] : General Exam:\par \par [The patient is alert and oriented, is well appearing, and in no apparent distress]\par [Cervical spine mobility is full in all directions]\par [There are no apparent skin lesions, ulcers, or masses]\par [Inspection of the extremities shows no signs of skin changes or muscle asymmetry]\par \par Examination of her right wrist immediately reveals significant swelling and ecchymosis.\par She has full digital cascade however she has significant swelling about all the fingers.\par She has significant tenderness overlying the distal radius.\par Pronation and supination at the wrist causes significant discomfort at the level of the DRUJ.  I do not perceive DRUJ instability.\par Her EIP appears to be intact [de-identified] : 3 views of right wrist were obtained today in my office and were seen by me and discussed with the patient. \par These show findings consistent with a right distal radius fracture with an intra-articular step-off at the level of the DRUJ and into the distal articulation causing a separate dorsal ulnar fragment.\par

## 2022-10-19 NOTE — ASSESSMENT
[FreeTextEntry1] : ASSESSMENT:\par \par [She was accompanied today and was assisted with explaining their complaints today.]\par The patient comes in today with acute 5-day complaints of right wrist pain after a fall complicated by a diagnosed right distal radius fracture and distal ulna fracture.  This is significantly aggravating her.  She considers herself to be a very active person in which she would like to avoid complications of arthritis at the wrist joint as well as begin motion as soon as possible.\par \par [This is likely to diminish bodily function for the extremity.] \par \par [I was able to personally view outside imaging for my own interpretation.\par \par She was adequately and thoroughly informed of my assessment of their current condition(s). \par \par We had a thorough discussion regarding her right distal radius fracture.  There is a fracture line going into the DRUJ joint as well as into the distal radiocarpal articulation.  This is caused a separate dorsal ulnar fragment.  I am concerned that she might develop significant DRUJ joint arthritis because of this specific fragment.  I am also concerned about the stability of the wrist because of this.  I informed her that the likelihood of developing arthritis at this joint is high despite surgery versus nonoperative management.\par \par At this point she would like to proceed with surgical management versus returning in 1 week for observation.\par \par Surgical Consent Discussion:\par \par I explained the risks and benefits of surgical intervention to the patient. The risks include, but are not limited to: pain, infection, swelling, stiffness, injury to underlying neurovascular structures, scar sensitivity, incomplete resolution of symptoms, the possibility of the need for future surgery and finally the need to comply with a post-operative occupational therapy protocol. The patient understands, agrees and informed consent was obtained. The patient’s surgery will be scheduled in the near future.\par \par \par

## 2022-10-19 NOTE — CONSULT LETTER
[Dear  ___] : Dear  [unfilled], [Consult Letter:] : I had the pleasure of evaluating your patient, [unfilled]. [Please see my note below.] : Please see my note below. [Consult Closing:] : Thank you very much for allowing me to participate in the care of this patient.  If you have any questions, please do not hesitate to contact me. [Sincerely,] : Sincerely, [FreeTextEntry2] : KATLIN GIL  [FreeTextEntry3] : Dillon Rogers MD\par [facility]\par [facility]\par [address]\[place] [address]\par Tel [phone]\par Fax [phone] \par \par For same day and next day orthopedic appointments contact:\par [email or web address] <[email or web address]> |[phone])\par Appointments available nights and weekends!  \par \par [facility]\par Visit us at [facility]/[email or web address]-institute\par

## 2022-10-19 NOTE — DISCUSSION/SUMMARY
[FreeTextEntry1] : 1.  She has elected to proceed with a right distal radius fracture ORIF -we talked about volar and dorsal approaches for this potentially\par \par #2 I have given her a fracture brace that she should wear day and night and she is nonweightbearing.\par \par #3 I would like to see her back 2 weeks after surgery for dressing removal x-ray assessment and clinical evaluation.\par \par

## 2022-10-27 ENCOUNTER — INPATIENT (INPATIENT)
Facility: HOSPITAL | Age: 77
LOS: 1 days | Discharge: HOME | DRG: 502 | End: 2022-10-29
Attending: STUDENT IN AN ORGANIZED HEALTH CARE EDUCATION/TRAINING PROGRAM | Admitting: STUDENT IN AN ORGANIZED HEALTH CARE EDUCATION/TRAINING PROGRAM
Payer: MEDICARE

## 2022-10-27 VITALS
WEIGHT: 164.91 LBS | DIASTOLIC BLOOD PRESSURE: 99 MMHG | HEART RATE: 86 BPM | HEIGHT: 62 IN | RESPIRATION RATE: 15 BRPM | SYSTOLIC BLOOD PRESSURE: 165 MMHG | TEMPERATURE: 98 F

## 2022-10-27 DIAGNOSIS — Z98.89 OTHER SPECIFIED POSTPROCEDURAL STATES: Chronic | ICD-10-CM

## 2022-10-27 DIAGNOSIS — S52.501A UNSPECIFIED FRACTURE OF THE LOWER END OF RIGHT RADIUS, INITIAL ENCOUNTER FOR CLOSED FRACTURE: ICD-10-CM

## 2022-10-27 DIAGNOSIS — H26.40 UNSPECIFIED SECONDARY CATARACT: Chronic | ICD-10-CM

## 2022-10-27 LAB
ALBUMIN SERPL ELPH-MCNC: 4.1 G/DL — SIGNIFICANT CHANGE UP (ref 3.3–5.2)
ALP SERPL-CCNC: 82 U/L — SIGNIFICANT CHANGE UP (ref 40–120)
ALT FLD-CCNC: 22 U/L — SIGNIFICANT CHANGE UP
ANION GAP SERPL CALC-SCNC: 13 MMOL/L — SIGNIFICANT CHANGE UP (ref 5–17)
APTT BLD: 30.4 SEC — SIGNIFICANT CHANGE UP (ref 27.5–35.5)
AST SERPL-CCNC: 23 U/L — SIGNIFICANT CHANGE UP
BASOPHILS # BLD AUTO: 0.07 K/UL — SIGNIFICANT CHANGE UP (ref 0–0.2)
BASOPHILS NFR BLD AUTO: 0.9 % — SIGNIFICANT CHANGE UP (ref 0–2)
BILIRUB SERPL-MCNC: 0.8 MG/DL — SIGNIFICANT CHANGE UP (ref 0.4–2)
BLD GP AB SCN SERPL QL: SIGNIFICANT CHANGE UP
BUN SERPL-MCNC: 16.1 MG/DL — SIGNIFICANT CHANGE UP (ref 8–20)
CALCIUM SERPL-MCNC: 10 MG/DL — SIGNIFICANT CHANGE UP (ref 8.4–10.5)
CHLORIDE SERPL-SCNC: 101 MMOL/L — SIGNIFICANT CHANGE UP (ref 96–108)
CO2 SERPL-SCNC: 25 MMOL/L — SIGNIFICANT CHANGE UP (ref 22–29)
CREAT SERPL-MCNC: 0.5 MG/DL — SIGNIFICANT CHANGE UP (ref 0.5–1.3)
EGFR: 98 ML/MIN/1.73M2 — SIGNIFICANT CHANGE UP
EOSINOPHIL # BLD AUTO: 0.12 K/UL — SIGNIFICANT CHANGE UP (ref 0–0.5)
EOSINOPHIL NFR BLD AUTO: 1.5 % — SIGNIFICANT CHANGE UP (ref 0–6)
GLUCOSE SERPL-MCNC: 76 MG/DL — SIGNIFICANT CHANGE UP (ref 70–99)
HCT VFR BLD CALC: 43.1 % — SIGNIFICANT CHANGE UP (ref 34.5–45)
HGB BLD-MCNC: 14.5 G/DL — SIGNIFICANT CHANGE UP (ref 11.5–15.5)
IMM GRANULOCYTES NFR BLD AUTO: 0.4 % — SIGNIFICANT CHANGE UP (ref 0–0.9)
INR BLD: 1.03 RATIO — SIGNIFICANT CHANGE UP (ref 0.88–1.16)
LYMPHOCYTES # BLD AUTO: 1.8 K/UL — SIGNIFICANT CHANGE UP (ref 1–3.3)
LYMPHOCYTES # BLD AUTO: 22.3 % — SIGNIFICANT CHANGE UP (ref 13–44)
MCHC RBC-ENTMCNC: 33.6 GM/DL — SIGNIFICANT CHANGE UP (ref 32–36)
MCHC RBC-ENTMCNC: 34 PG — SIGNIFICANT CHANGE UP (ref 27–34)
MCV RBC AUTO: 100.9 FL — HIGH (ref 80–100)
MONOCYTES # BLD AUTO: 1.09 K/UL — HIGH (ref 0–0.9)
MONOCYTES NFR BLD AUTO: 13.5 % — SIGNIFICANT CHANGE UP (ref 2–14)
NEUTROPHILS # BLD AUTO: 4.95 K/UL — SIGNIFICANT CHANGE UP (ref 1.8–7.4)
NEUTROPHILS NFR BLD AUTO: 61.4 % — SIGNIFICANT CHANGE UP (ref 43–77)
PLATELET # BLD AUTO: 355 K/UL — SIGNIFICANT CHANGE UP (ref 150–400)
POTASSIUM SERPL-MCNC: 4.2 MMOL/L — SIGNIFICANT CHANGE UP (ref 3.5–5.3)
POTASSIUM SERPL-SCNC: 4.2 MMOL/L — SIGNIFICANT CHANGE UP (ref 3.5–5.3)
PROT SERPL-MCNC: 7.3 G/DL — SIGNIFICANT CHANGE UP (ref 6.6–8.7)
PROTHROM AB SERPL-ACNC: 11.9 SEC — SIGNIFICANT CHANGE UP (ref 10.5–13.4)
RBC # BLD: 4.27 M/UL — SIGNIFICANT CHANGE UP (ref 3.8–5.2)
RBC # FLD: 14.4 % — SIGNIFICANT CHANGE UP (ref 10.3–14.5)
SARS-COV-2 RNA SPEC QL NAA+PROBE: SIGNIFICANT CHANGE UP
SODIUM SERPL-SCNC: 139 MMOL/L — SIGNIFICANT CHANGE UP (ref 135–145)
WBC # BLD: 8.06 K/UL — SIGNIFICANT CHANGE UP (ref 3.8–10.5)
WBC # FLD AUTO: 8.06 K/UL — SIGNIFICANT CHANGE UP (ref 3.8–10.5)

## 2022-10-27 PROCEDURE — 71045 X-RAY EXAM CHEST 1 VIEW: CPT | Mod: 26

## 2022-10-27 PROCEDURE — 99285 EMERGENCY DEPT VISIT HI MDM: CPT

## 2022-10-27 PROCEDURE — 99222 1ST HOSP IP/OBS MODERATE 55: CPT

## 2022-10-27 RX ORDER — ACETAMINOPHEN 500 MG
650 TABLET ORAL EVERY 6 HOURS
Refills: 0 | Status: DISCONTINUED | OUTPATIENT
Start: 2022-10-27 | End: 2022-10-28

## 2022-10-27 RX ORDER — SERTRALINE 25 MG/1
50 TABLET, FILM COATED ORAL DAILY
Refills: 0 | Status: DISCONTINUED | OUTPATIENT
Start: 2022-10-27 | End: 2022-10-28

## 2022-10-27 RX ORDER — POVIDONE-IODINE 5 %
1 AEROSOL (ML) TOPICAL ONCE
Refills: 0 | Status: COMPLETED | OUTPATIENT
Start: 2022-10-27 | End: 2022-10-28

## 2022-10-27 RX ORDER — OXYCODONE HYDROCHLORIDE 5 MG/1
5 TABLET ORAL
Refills: 0 | Status: DISCONTINUED | OUTPATIENT
Start: 2022-10-27 | End: 2022-10-28

## 2022-10-27 RX ORDER — ATORVASTATIN CALCIUM 80 MG/1
40 TABLET, FILM COATED ORAL AT BEDTIME
Refills: 0 | Status: DISCONTINUED | OUTPATIENT
Start: 2022-10-27 | End: 2022-10-28

## 2022-10-27 RX ORDER — HYDROMORPHONE HYDROCHLORIDE 2 MG/ML
0.5 INJECTION INTRAMUSCULAR; INTRAVENOUS; SUBCUTANEOUS
Refills: 0 | Status: DISCONTINUED | OUTPATIENT
Start: 2022-10-27 | End: 2022-10-28

## 2022-10-27 RX ORDER — OXYCODONE HYDROCHLORIDE 5 MG/1
5 TABLET ORAL ONCE
Refills: 0 | Status: DISCONTINUED | OUTPATIENT
Start: 2022-10-27 | End: 2022-10-27

## 2022-10-27 RX ORDER — MUPIROCIN 20 MG/G
1 OINTMENT TOPICAL
Refills: 0 | Status: DISCONTINUED | OUTPATIENT
Start: 2022-10-27 | End: 2022-10-28

## 2022-10-27 RX ORDER — INFLUENZA VIRUS VACCINE 15; 15; 15; 15 UG/.5ML; UG/.5ML; UG/.5ML; UG/.5ML
0.7 SUSPENSION INTRAMUSCULAR ONCE
Refills: 0 | Status: DISCONTINUED | OUTPATIENT
Start: 2022-10-27 | End: 2022-10-29

## 2022-10-27 RX ORDER — ATORVASTATIN CALCIUM 80 MG/1
1 TABLET, FILM COATED ORAL
Qty: 0 | Refills: 0 | DISCHARGE

## 2022-10-27 RX ORDER — HYDROMORPHONE HYDROCHLORIDE 2 MG/ML
4 INJECTION INTRAMUSCULAR; INTRAVENOUS; SUBCUTANEOUS
Refills: 0 | Status: DISCONTINUED | OUTPATIENT
Start: 2022-10-27 | End: 2022-10-28

## 2022-10-27 RX ORDER — ZOLPIDEM TARTRATE 10 MG/1
5 TABLET ORAL AT BEDTIME
Refills: 0 | Status: DISCONTINUED | OUTPATIENT
Start: 2022-10-27 | End: 2022-10-28

## 2022-10-27 RX ORDER — CEFAZOLIN SODIUM 1 G
2000 VIAL (EA) INJECTION ONCE
Refills: 0 | Status: DISCONTINUED | OUTPATIENT
Start: 2022-10-28 | End: 2022-10-28

## 2022-10-27 RX ORDER — ATORVASTATIN CALCIUM 80 MG/1
40 TABLET, FILM COATED ORAL AT BEDTIME
Refills: 0 | Status: DISCONTINUED | OUTPATIENT
Start: 2022-10-27 | End: 2022-10-27

## 2022-10-27 RX ORDER — ENOXAPARIN SODIUM 100 MG/ML
40 INJECTION SUBCUTANEOUS ONCE
Refills: 0 | Status: DISCONTINUED | OUTPATIENT
Start: 2022-10-27 | End: 2022-10-27

## 2022-10-27 RX ADMIN — Medication 650 MILLIGRAM(S): at 19:12

## 2022-10-27 RX ADMIN — ATORVASTATIN CALCIUM 40 MILLIGRAM(S): 80 TABLET, FILM COATED ORAL at 22:34

## 2022-10-27 RX ADMIN — OXYCODONE HYDROCHLORIDE 5 MILLIGRAM(S): 5 TABLET ORAL at 14:47

## 2022-10-27 NOTE — PATIENT PROFILE ADULT - FALL HARM RISK - ATTEMPT OOB
July 23, 2019      Trevin Huber MD  [address]  [place] [address]           [facility]  [address]  [place] [address]  Phone: [phone]          Patient: Tiarra Norton   MR Number: [id number]   YOB: 1952   Date of Visit: 7/23/2019       Dear Dr. Trevin Huber:    Thank you for referring Tiarra Norton to me for evaluation. Attached you will find relevant portions of my assessment and plan of care.    If you have questions, please do not hesitate to call me. I look forward to following Tiarra Norton along with you.    Sincerely,    Akila Agarwal PA-C    Enclosure  CC:  No Recipients    If you would like to receive this communication electronically, please contact [email or web address] or [phone] to request more information on [employer] Link access.    For providers and/or their staff who would like to refer a patient to [facility], please contact us through our one-stop-shop provider referral line, [facility] , at [phone].    If you feel you have received this communication in error or would no longer like to receive these types of communications, please e-mail [email or web address]          No

## 2022-10-27 NOTE — H&P ADULT - PROBLEM SELECTOR PLAN 1
d/w dr. campbell  medical consult requested  f/u pre op labs  plan for OR tomorrow pending optimization

## 2022-10-27 NOTE — PROGRESS NOTE ADULT - ASSESSMENT
76y Female with Remote Hx of Afrib s/p cardioversion long ago, she takes aspirin 325mg daily,, she presented with right wrist pain after mechanical fall, she went to see outpatient with Dr. Rogers, she had Xray done on 15th october showed impacted RIGHT distal radius metaphyseal fracture, Fractured ulnar styloid tip, she is s/p reduction, she had reduction done, she was still feeling pain was told by Dr Paul to come to [facility] ER today for surgery tomorrow, medicine consulted for the medical optimization, patient denies chest pain, sob, fever, chills, numbness or weakness of the hand, she denies hit her head or loosing consciousness, patient METS>8, RCRI is 0, Patient denies any exertional sob or chest pain,  76y Female with Remote Hx of Afrib s/p cardioversion long ago, she takes aspirin 325mg daily,, she presented with right wrist pain after mechanical fall, she went to see outpatient with Dr. Rogers, she had Xray done on 15th october showed impacted RIGHT distal radius metaphyseal fracture, Fractured ulnar styloid tip, she is s/p reduction, she had reduction done, she was still feeling pain was told by Dr Paul to come to [facility] ER today for surgery tomorrow, medicine consulted for the medical optimization, patient denies chest pain, sob, fever, chills, numbness or weakness of the hand, she denies hit her head or loosing consciousness, patient METS>8, RCRI is 0, Patient denies any exertional sob or chest pain, patient is at low risk for perioperative cardiovascular complications and is optimized from the medicine point of view for planned procedure, patient vitals and labs reviewed, would like to check EKG before proceeding for procedure.     Plan:      RIGHT distal radius metaphyseal fracture, Fractured ulnar styloid tip:   - Planed procedure as per Ortho   - VS stable  - abx per ortho   - c/w IVF once npo   - opiate induced constipation regimen   - encouraging incentive spirometry post operatively.   -DVT prophylaxis and Pain meds as per Ortho team     Hx of HLD: statins.  76y Female with Remote Hx of Afrib s/p cardioversion long ago, she takes aspirin 325mg daily,, she presented with right wrist pain after mechanical fall, she went to see outpatient with Dr. Rogers, she had Xray done on 15th october showed impacted RIGHT distal radius metaphyseal fracture, Fractured ulnar styloid tip, she is s/p reduction, she had reduction done, she was still feeling pain was told by Dr Paul to come to [facility] ER today for surgery tomorrow, medicine consulted for the medical optimization, patient denies chest pain, sob, fever, chills, numbness or weakness of the hand, she denies hit her head or loosing consciousness, patient METS>8, RCRI is 0, Patient denies any exertional sob or chest pain, patient is at low risk for perioperative cardiovascular complications and is optimized from the medicine point of view for planned procedure, patient vitals, EKG and labs reviewed.       Plan:      RIGHT distal radius metaphyseal fracture, Fractured ulnar styloid tip:   - Planed procedure as per Ortho   - VS stable  - abx per ortho   - c/w IVF once npo   - opiate induced constipation regimen   - encouraging incentive spirometry post operatively.   -DVT prophylaxis and Pain meds as per Ortho team     Hx of HLD: statins.

## 2022-10-27 NOTE — ED ADULT NURSE NOTE - NSIMPLEMENTINTERV_GEN_ALL_ED
Implemented All Fall with Harm Risk Interventions:  [place] to call system. Call bell, personal items and telephone within reach. Instruct patient to call for assistance. Room bathroom lighting operational. Non-slip footwear when patient is off stretcher. Physically safe environment: no spills, clutter or unnecessary equipment. Stretcher in lowest position, wheels locked, appropriate side rails in place. Provide visual cue, wrist band, yellow gown, etc. Monitor gait and stability. Monitor for mental status changes and reorient to person, place, and time. Review medications for side effects contributing to fall risk. Reinforce activity limits and safety measures with patient and family. Provide visual clues: red socks.

## 2022-10-27 NOTE — PATIENT PROFILE ADULT - FALL HARM RISK - HARM RISK INTERVENTIONS

## 2022-10-27 NOTE — ED PROVIDER NOTE - OBJECTIVE STATEMENT
77yo F pmhx afib, arthritis presenting to ED c/o right wrist pain. Pt sustained right distal radius and ulna fx s/p fall 12 days ago, splinted in ED. Followed up with Dr. Rogers who put patient in velcro splint. Pt has had persistent pain to wrist so called ortho office and was told to come to ED. Denies new injury, fall, numbness, weakness, skin changes.

## 2022-10-27 NOTE — H&P ADULT - HISTORY OF PRESENT ILLNESS
Pt Name: SUBHASH PORRAS    MRN: [id number]      Patient is a 76y Female presenting to the emergency department with a chief complaint of right wrist pain. Patient states she has been following up outpatient with Dr. Rogers as instructed. Patient states she is still having pain, so called Dr. Rogers' office due to pain, and was instructed to come to [facility] ER today for surgery tomorrow. Patient admits to taking an ASA 325mg once daily for a fib. No other complaints at this time.    REVIEW OF SYSTEMS      General:	denies fever/chills    Respiratory and Thorax: denies SOB  	  Cardiovascular:	denies CP    Gastrointestinal:	 denies abd pain    Musculoskeletal:	 see HPI    Neurological:	denies numbness/tingling    ROS is otherwise negative.    PAST MEDICAL & SURGICAL HISTORY:  PAST MEDICAL & SURGICAL HISTORY:  Osteoarthritis  ,A- fib in Jan 2014 and converted back to normal sinus by self and had a cardiac ablation done in april 2014.      ADARSH (obstructive sleep apnea)  does not use CPAP      Atrial fibrillation      Depression      S/P right knee arthroscopy  (2014),right  foot surgery (1999), B/L cataract surgey (2008),      S/P ablation of atrial fibrillation      After cataract, bilateral          Allergies: Percocet 10/325 (Vomiting)      Medications: oxyCODONE    IR 5 milliGRAM(s) Oral Once      FAMILY HISTORY:  Family history of heart attack (Father)    : non-contributory    Social History:     Ambulation: Walking independently [x ] With Cane [ ] With Walker [ ]  Bedbound [ ]               PHYSICAL EXAM:    Vital Signs Last 24 Hrs  T(C): 36.8 (27 Oct 2022 12:17), Max: 36.8 (27 Oct 2022 12:17)  T(F): 98.3 (27 Oct 2022 12:17), Max: 98.3 (27 Oct 2022 12:17)  HR: 86 (27 Oct 2022 12:17) (86 - 86)  BP: 165/99 (27 Oct 2022 12:17) (165/99 - 165/99)  BP(mean): --  RR: 15 (27 Oct 2022 12:17) (15 - 15)  SpO2: --    Parameters below as of 27 Oct 2022 12:17  Patient On (Oxygen Delivery Method): room air      Daily Height in cm: 157.48 (27 Oct 2022 12:17)    Daily     Appearance: Alert, responsive, in no acute distress.    Neurological: Sensation is grossly intact to light touch.     Skin: no rash on visible skin. Skin is clean, dry and intact. No bleeding. No abrasions. No ulcerations.    Vascular: 2+ distal pulses. Cap refill < 2 sec. No signs of venous insuffiency or stasis. No extremity ulcerations. No cyanosis.    Musculoskeletal:         Right Upper Extremity: velco brace noted, removed for exam. SILT. + ROM phalanges, thumb ROM limited secondary to pain. radial pulse 2+. ext warm, cap refill brisk. no erythema.      A/P:  Pt is a  76y Female with with right  fx  PLAN:   D/W Dr. Rogers  * NPO for OR tomorrow  * Medical clearance requested for procedure  * NWB RUE  * f/u pre-op labs  * admit to ortho

## 2022-10-27 NOTE — INPATIENT CERTIFICATION FOR MEDICARE PATIENTS - PHYSICIAN CONCUR
Please see patient message.    Atorvastatin last filled 12/14/15 #90.  Was DC'd 9/7/16 due to side effects.  Was then entered as historical on 5/31/17 by PCP.  Losartan last filled 11/30/18 #90 + 1rf.    Last OV 11/13/18.  Has OV scheduled 8/19/19.    Has NOT had labs done since 2016.    Please advise.     I concur with the Admission Order and I certify that services are provided in accordance with Section 42 CFR § 412.3

## 2022-10-27 NOTE — ED ADULT NURSE REASSESSMENT NOTE - NS ED NURSE REASSESS COMMENT FT1
rn report given to holding room; pending bed to be cleaned. per RN NP aware and will notify ANM when ready

## 2022-10-27 NOTE — ED PROVIDER NOTE - CLINICAL SUMMARY MEDICAL DECISION MAKING FREE TEXT BOX
75yo F presenting to ED c/o right wrist pain, distal radius and ulna fx 12 days ago. told to come to ED by Dr. Rogers. will tx pain and consult orthopedics for final dispo recs

## 2022-10-27 NOTE — ED PROVIDER NOTE - PHYSICAL EXAMINATION
Gen: No acute distress, non toxic  HENT: NCAT, Mucous membranes moist, Oropharynx without exudates, uvula midline  Eyes: pink conjunctivae, EOMI, PERRL  CV: RRR, nl s1/s2.  Resp: CTAB, normal rate and effort  Neuro: A&O x 3, CN II-XII intact, sensorimotor intact without deficits   MSK: +TTP distal radius and ulna. pt in velcro splint  Vasc: Radial pulses 2+ b/l  Skin: No rashes. intact and perfused.

## 2022-10-27 NOTE — PATIENT PROFILE ADULT - PATIENT'S SEXUAL ORIENTATION
Second message received - please contact patient's . I know it is frustrating but I have not been involved in her care in the hospital so he really needs to bring up his concerns to the care team in the hospital (nursing staff, physicians, discharge planner).    Heterosexual

## 2022-10-27 NOTE — ED PROVIDER NOTE - CARE PLAN
1 Principal Discharge DX:	Distal radius fracture, right  Secondary Diagnosis:	Right distal ulnar fracture

## 2022-10-27 NOTE — ED ADULT TRIAGE NOTE - CHIEF COMPLAINT QUOTE
pt complaining of worsening right wrist pain since last week after a fall. Pt dneies any head trauma. Pt has brace on the right wrist.

## 2022-10-27 NOTE — PROGRESS NOTE ADULT - SUBJECTIVE AND OBJECTIVE BOX
76y Female hx a fib presenting to the emergency department with a chief complaint of right wrist pain for radius ORIF tomorrow. Medicine consult pending.    Send 2nd T&S    Can go to OR once medicine consult complete    Edmar Guadarrama MD  Dept of Anesthesia

## 2022-10-27 NOTE — ED ADULT TRIAGE NOTE - ISOLATION TYPE:
History Of Present Illness





33 y/o male is brought in by his family for bizarre behavior. History obtained 

from family because patient refuses to speak to me. As per family, patient has 

been paranoid and having bizarre behavior since yesterday. Patient told family 

that it feels like the devil is out to get him. Family reports that he had a 

similar episode 4 years ago. At that time, patient had marijuana that was laced 

with K2. Patient was incarcerated 2 years ago and got out 2017, and has been 

fine since. Family thinks that he has been smoking marijuana again. They state 

that he is unmanageable, uncontrollable, and can sometimes either get violent or

withdrawn. Patient did agree to come here to ER. 





Time Seen by Provider: 04/06/19 15:05


Chief Complaint (Nursing): Psychiatric Evaluation


History Per: Family


Onset/Duration Of Symptoms: Days


Current Symptoms Are (Timing): Still Present





Past Medical History


Reviewed: Historical Data, Nursing Documentation, Vital Signs


Vital Signs: 





                                Last Vital Signs











Temp  99.2 F   04/06/19 15:32


 


Pulse  104 H  04/06/19 15:32


 


Resp  18   04/06/19 15:32


 


BP  185/109 H  04/06/19 15:32


 


Pulse Ox  98   04/06/19 15:32











Family History: States: No Known Family Hx





- Social History


Hx Alcohol Use: No


Hx Substance Use: Yes





- Immunization History


Hx Tetanus Toxoid Vaccination: No


Hx Influenza Vaccination: No


Hx Pneumococcal Vaccination: No





Review Of Systems


Constitutional: Negative for: Fever, Chills


Cardiovascular: Negative for: Chest Pain


Respiratory: Negative for: Cough, Shortness of Breath


Gastrointestinal: Negative for: Nausea, Vomiting, Abdominal Pain


Psych: Positive for: Psychosis (Paranoia)





Physical Exam





- Physical Exam


Appears: Non-toxic, No Acute Distress


Skin: Warm, Dry


Head: Atraumatic, Normacephalic


Eye(s): bilateral: Normal Inspection


Oral Mucosa: Moist


Neck: Supple


Cardiovascular: Rhythm Regular, No Murmur


Respiratory: Normal Breath Sounds, No Rales, No Rhonchi, No Wheezing


Gastrointestinal/Abdominal: Soft, No Tenderness


Extremity: Bilateral: Atraumatic, Normal ROM


Neurological/Psych: Other (Can't assess orientation, nonverbal)





ED Course And Treatment





- Laboratory Results


Result Diagrams: 


                                 04/06/19 15:25





                                 04/06/19 15:25


Lab Results: 





                                        











Total Bilirubin  0.8 mg/dL (0.2-1.3)   04/06/19  15:25    


 


AST  76 U/L (17-59)  H  04/06/19  15:25    


 


ALT  42 U/L (21-72)   04/06/19  15:25    


 


Alkaline Phosphatase  104 U/L ()   04/06/19  15:25    


 


Total Protein  9.0 g/dL (6.3-8.3)  H  04/06/19  15:25    


 


Albumin  5.2 g/dL (3.5-5.0)  H  04/06/19  15:25    


 


Globulin  3.9 gm/dL (2.2-3.9)   04/06/19  15:25    


 


Albumin/Globulin Ratio  1.3  (1.0-2.1)   04/06/19  15:25    








                                        











Urine Color  [place]  (YELLOW)   04/06/19  15:30    


 


Urine Clarity  Hazy  (Clear)   04/06/19  15:30    


 


Urine pH  5.0  (5.0-8.0)   04/06/19  15:30    


 


Ur Specific Gravity  1.033  (1.003-1.030)  H  04/06/19  15:30    


 


Urine Protein  2+ mg/dL (NEGATIVE)  H  04/06/19  15:30    


 


Urine Glucose (UA)  Normal mg/dL (Normal)   04/06/19  15:30    


 


Urine Ketones  2+ mg/dL (NEGATIVE)  H  04/06/19  15:30    


 


Urine Blood  Negative  (NEGATIVE)   04/06/19  15:30    


 


Urine Nitrate  Negative  (NEGATIVE)   04/06/19  15:30    


 


Urine Bilirubin  Negative  (NEGATIVE)   04/06/19  15:30    


 


Urine Urobilinogen  Normal mg/dL (0.2-1.0)   04/06/19  15:30    


 


Ur Leukocyte Esterase  Neg Arnulfo/uL (Negative)   04/06/19  15:30    


 


Urine WBC (Auto)  2 /hpf (0-5)   04/06/19  15:30    


 


Ur Squamous Epith Cells  1 /hpf (0-5)   04/06/19  15:30    











Lab Interpretation: Normal (except UDS + marijuana)


ECG: Interpreted By Me


ECG Rhythm: Sinus Tachycardia (with right axis)


ECG Interpretation: No Acute Changes


O2 Sat by Pulse Oximetry: 98 (RA)


Pulse Ox Interpretation: Normal





- Radiology


CXR: Interpreted by Me


CXR Interpretation: Yes: No Acute Disease





- Other Rad


  ** CXR


X-Ray: Read By Radiologist


Interpretation: FINDINGS:  LUNGS:  No active pulmonary disease.  PLEURA:  No 

significant pleural effusion identified. No pneumothorax apparent.  

CARDIOVASCULAR:  No aortic atherosclerotic calcification present.  Normal 

cardiac size. No pulmonary vascular congestion.  OSSEOUS STRUCTURES:  No 

significant abnormalities.  VISUALIZED UPPER ABDOMEN:  Normal.  OTHER FINDINGS: 

None.  IMPRESSION:  No active disease.


Progress Note: Patient is medically cleared for psychiatric evaluation.  7:30 

patient remains awake and alert. He has eaten a small amount of food and is 

sitting quietly with family at bedside. He is still refusing to speak.  11:00 

Patient was seen by [facility] screener and accepted for psychiatric admission. 

Awaiting bed placement.





Medical Decision Making


Medical Decision Making: 





Plan:


--Labs


--UA  








Disposition





- Disposition


Disposition Time: 23:27


Condition: STABLE





- Clinical Impression


Clinical Impression: 


 Schizophrenia, Psychotic affective disorder








- Scribe Statement


The provider has reviewed the documentation as recorded by the Ignacioibgina Richardson





Provider Attestation: 





All medical record entries made by the Ignacioibgina were at my direction and 

personally dictated by me. I have reviewed the chart and agree that the record 

accurately reflects my personal performance of the history, physical exam, 

medical decision making, and the department course for this patient. I have also

personally directed, reviewed, and agree with the discharge instructions and 

disposition.





Physician Patient Turnover


Patient Signed Over To: Souleymane Herrmann DO


Handoff Comments: pending bed placement None

## 2022-10-27 NOTE — ED PROVIDER NOTE - NS ED ROS FT
Gen: denies fever, chills, fatigue, weight loss  Skin: denies rashes, laceration, bruising  HEENT: denies visual changes, ear pain, nasal congestion, throat pain  Respiratory: denies PAGE, SOB, cough, wheezing  Cardiovascular: denies chest pain, palpitations, diaphoresis, LE edema  GI: denies abdominal pain, n/v/d  : denies dysuria, frequency, urgency, bowel/bladder incontinence  MSK: R wrist pain. Denies back pain, neck pain  Neuro: denies headache, dizziness, weakness, numbness

## 2022-10-27 NOTE — ED PROVIDER NOTE - ATTENDING APP SHARED VISIT CONTRIBUTION OF CARE
75yo F pmhx afib, arthritis presenting to ED c/o right wrist pain. Pt sustained right distal radius and ulna fx s/p fall 12 days ago, splinted in ED. Followed up with Dr. Rogers who put patient in velcro splint. Worsening pain. Denies new injury, fall, numbness, weakness, skin changes.  Ap - well appearing, ortho consult. plan for admission for OR

## 2022-10-27 NOTE — PROGRESS NOTE ADULT - SUBJECTIVE AND OBJECTIVE BOX
76y Female with Remote Hx of Afrib s/p cardioversion long ago, she takes aspirin 325mg daily, HTN, HLD she presented with right wrist pain after mechanical fall, she went to see outpatient with Dr. Rogers, she had Xray done on 15th october showed impacted RIGHT distal radius metaphyseal fracture, Fractured ulnar styloid tip, she is s/p reduction, she had reduction done, she was still feeling pain was told by Dr Paul to come to [facility] ER today for surgery tomorrow, medicine consulted for the medical optimization, patient denies chest pain, sob, fever, chills, numbness or weakness of the hand, she denies hit her head or loosing consciousness.      REVIEW OF SYSTEMS:    CONSTITUTIONAL: No fever, some fatigue  RESPIRATORY: No cough, No shortness of breath  CARDIOVASCULAR: No chest pain, palpitations  GASTROINTESTINAL: No abdominal, No nausea, vomiting  NEUROLOGICAL: No headaches,  loss of strength.  MISCELLANEOUS: right wrist pain     PAST MEDICAL & SURGICAL HISTORY:  Osteoarthritis  ,A- fib in Jan 2014 and converted back to normal sinus by self and had a cardiac ablation done in april 2014.      ADARSH (obstructive sleep apnea)  does not use CPAP      Atrial fibrillation      Depression      S/P right knee arthroscopy  (2014),right  foot surgery (1999), B/L cataract surgey (2008),      S/P ablation of atrial fibrillation      After cataract, bilateral      Allergies: Percocet 10/325 (Vomiting)      Medications: oxyCODONE    IR 5 milliGRAM(s) Oral Once      FAMILY HISTORY:  Family history of heart attack (Father)      Social History:  Not a smoker, drinker or using any drugs       Vital Signs Last 24 Hrs  T(C): 36.8 (27 Oct 2022 19:42), Max: 36.8 (27 Oct 2022 12:17)  T(F): 98.3 (27 Oct 2022 19:42), Max: 98.3 (27 Oct 2022 12:17)  HR: 76 (27 Oct 2022 19:42) (76 - 86)  BP: 137/75 (27 Oct 2022 19:42) (137/75 - 165/99)  RR: 15 (27 Oct 2022 12:17) (15 - 15)  SpO2: 94% (27 Oct 2022 19:42) (94% - 94%)    Parameters below as of 27 Oct 2022 12:17  Patient On (Oxygen Delivery Method): room air        PHYSICAL EXAM:    GENERAL: Elderly female looking comfortable   HEENT: PERRL, +EOMI  NECK: soft, Supple, No JVD  CHEST/LUNG: Clear to auscultate bilaterally; No wheezing  HEART: S1S2+, Regular rate and rhythm; No murmurs  ABDOMEN: Soft, Nontender, Nondistended; Bowel sounds present  EXTREMITIES:  1+ Peripheral Pulses, No edema, right wrist with splint on  SKIN: No rashes or lesions  NEURO: AAOX3, no focal deficits  PSYCH: normal mood      LABS:                        14.5   8.06  )-----------( 355      ( 27 Oct 2022 14:40 )             43.1     10-27    139  |  101  |  16.1  ----------------------------<  76  4.2   |  25.0  |  0.50    Ca    10.0      27 Oct 2022 14:40    TPro  7.3  /  Alb  4.1  /  TBili  0.8  /  DBili  x   /  AST  23  /  ALT  22  /  AlkPhos  82  10-27    PT/INR - ( 27 Oct 2022 14:40 )   PT: 11.9 sec;   INR: 1.03 ratio         PTT - ( 27 Oct 2022 14:40 )  PTT:30.4 sec        I&O's Summary      MEDICATIONS  (STANDING):  atorvastatin 40 milliGRAM(s) Oral at bedtime  enoxaparin Injectable 40 milliGRAM(s) SubCutaneous once  mupirocin 2% Ointment 1 Application(s) Both Nostrils two times a day  povidone iodine 5% Nasal Swab 1 Application(s) Both Nostrils once    MEDICATIONS  (PRN):  acetaminophen     Tablet .. 650 milliGRAM(s) Oral every 6 hours PRN Mild Pain (1 - 3)

## 2022-10-27 NOTE — PATIENT PROFILE ADULT - NSTRANSFERBELONGINGSDISPO_GEN_A_NUR
pt has cell phone and eye glasses and left bracelet, earrings, pajama pants, pink shoes/given to family

## 2022-10-28 ENCOUNTER — TRANSCRIPTION ENCOUNTER (OUTPATIENT)
Age: 77
End: 2022-10-28

## 2022-10-28 ENCOUNTER — APPOINTMENT (OUTPATIENT)
Dept: ORTHOPEDIC SURGERY | Facility: HOSPITAL | Age: 77
End: 2022-10-28

## 2022-10-28 LAB
GLUCOSE BLDC GLUCOMTR-MCNC: 86 MG/DL — SIGNIFICANT CHANGE UP (ref 70–99)
GLUCOSE BLDC GLUCOMTR-MCNC: 96 MG/DL — SIGNIFICANT CHANGE UP (ref 70–99)
GLUCOSE BLDC GLUCOMTR-MCNC: 98 MG/DL — SIGNIFICANT CHANGE UP (ref 70–99)

## 2022-10-28 PROCEDURE — 25280 REVISE WRIST/FOREARM TENDON: CPT | Mod: RT

## 2022-10-28 PROCEDURE — 25609 OPTX DST RD XART FX/EP SEP3+: CPT | Mod: RT

## 2022-10-28 PROCEDURE — 99232 SBSQ HOSP IP/OBS MODERATE 35: CPT

## 2022-10-28 PROCEDURE — 64772 INCISION OF SPINAL NERVE: CPT | Mod: RT

## 2022-10-28 DEVICE — IMPLANTABLE DEVICE: Type: IMPLANTABLE DEVICE | Status: FUNCTIONAL

## 2022-10-28 DEVICE — SCREW VAL KREULOCK 2.4X16MM: Type: IMPLANTABLE DEVICE | Status: FUNCTIONAL

## 2022-10-28 DEVICE — PLATE DIST RAD VOLAR NRRW 3H RT: Type: IMPLANTABLE DEVICE | Status: FUNCTIONAL

## 2022-10-28 DEVICE — SCREW VAL KREULOCK 2.4X18MM: Type: IMPLANTABLE DEVICE | Status: FUNCTIONAL

## 2022-10-28 DEVICE — SCREW KREULOCK TI 3.5X12MM: Type: IMPLANTABLE DEVICE | Status: FUNCTIONAL

## 2022-10-28 DEVICE — GWIRE TROCAR TIP 1.35X150MM: Type: IMPLANTABLE DEVICE | Status: FUNCTIONAL

## 2022-10-28 RX ORDER — SODIUM CHLORIDE 9 MG/ML
1000 INJECTION INTRAMUSCULAR; INTRAVENOUS; SUBCUTANEOUS
Refills: 0 | Status: DISCONTINUED | OUTPATIENT
Start: 2022-10-28 | End: 2022-10-29

## 2022-10-28 RX ORDER — ACETAMINOPHEN 500 MG
650 TABLET ORAL EVERY 6 HOURS
Refills: 0 | Status: DISCONTINUED | OUTPATIENT
Start: 2022-10-28 | End: 2022-10-29

## 2022-10-28 RX ORDER — SODIUM CHLORIDE 9 MG/ML
1000 INJECTION INTRAMUSCULAR; INTRAVENOUS; SUBCUTANEOUS
Refills: 0 | Status: DISCONTINUED | OUTPATIENT
Start: 2022-10-28 | End: 2022-10-28

## 2022-10-28 RX ORDER — ONDANSETRON 8 MG/1
4 TABLET, FILM COATED ORAL ONCE
Refills: 0 | Status: DISCONTINUED | OUTPATIENT
Start: 2022-10-28 | End: 2022-10-28

## 2022-10-28 RX ORDER — HYDROMORPHONE HYDROCHLORIDE 2 MG/ML
0.5 INJECTION INTRAMUSCULAR; INTRAVENOUS; SUBCUTANEOUS
Refills: 0 | Status: DISCONTINUED | OUTPATIENT
Start: 2022-10-28 | End: 2022-10-28

## 2022-10-28 RX ORDER — FENTANYL CITRATE 50 UG/ML
50 INJECTION INTRAVENOUS ONCE
Refills: 0 | Status: DISCONTINUED | OUTPATIENT
Start: 2022-10-28 | End: 2022-10-28

## 2022-10-28 RX ORDER — ATORVASTATIN CALCIUM 80 MG/1
40 TABLET, FILM COATED ORAL AT BEDTIME
Refills: 0 | Status: DISCONTINUED | OUTPATIENT
Start: 2022-10-28 | End: 2022-10-29

## 2022-10-28 RX ORDER — SERTRALINE 25 MG/1
50 TABLET, FILM COATED ORAL DAILY
Refills: 0 | Status: DISCONTINUED | OUTPATIENT
Start: 2022-10-28 | End: 2022-10-29

## 2022-10-28 RX ORDER — ZOLPIDEM TARTRATE 10 MG/1
5 TABLET ORAL AT BEDTIME
Refills: 0 | Status: DISCONTINUED | OUTPATIENT
Start: 2022-10-28 | End: 2022-10-29

## 2022-10-28 RX ORDER — OXYCODONE HYDROCHLORIDE 5 MG/1
10 TABLET ORAL EVERY 4 HOURS
Refills: 0 | Status: DISCONTINUED | OUTPATIENT
Start: 2022-10-28 | End: 2022-10-29

## 2022-10-28 RX ORDER — ASPIRIN/CALCIUM CARB/MAGNESIUM 324 MG
325 TABLET ORAL DAILY
Refills: 0 | Status: DISCONTINUED | OUTPATIENT
Start: 2022-10-28 | End: 2022-10-28

## 2022-10-28 RX ORDER — OXYCODONE HYDROCHLORIDE 5 MG/1
5 TABLET ORAL EVERY 4 HOURS
Refills: 0 | Status: DISCONTINUED | OUTPATIENT
Start: 2022-10-28 | End: 2022-10-29

## 2022-10-28 RX ORDER — TRAMADOL HYDROCHLORIDE 50 MG/1
100 TABLET ORAL EVERY 6 HOURS
Refills: 0 | Status: DISCONTINUED | OUTPATIENT
Start: 2022-10-28 | End: 2022-10-28

## 2022-10-28 RX ORDER — CEFAZOLIN SODIUM 1 G
2000 VIAL (EA) INJECTION
Refills: 0 | Status: COMPLETED | OUTPATIENT
Start: 2022-10-28 | End: 2022-10-29

## 2022-10-28 RX ORDER — ONDANSETRON 8 MG/1
4 TABLET, FILM COATED ORAL EVERY 6 HOURS
Refills: 0 | Status: DISCONTINUED | OUTPATIENT
Start: 2022-10-28 | End: 2022-10-29

## 2022-10-28 RX ORDER — MAGNESIUM HYDROXIDE 400 MG/1
30 TABLET, CHEWABLE ORAL DAILY
Refills: 0 | Status: DISCONTINUED | OUTPATIENT
Start: 2022-10-28 | End: 2022-10-29

## 2022-10-28 RX ORDER — FENTANYL CITRATE 50 UG/ML
25 INJECTION INTRAVENOUS
Refills: 0 | Status: DISCONTINUED | OUTPATIENT
Start: 2022-10-28 | End: 2022-10-28

## 2022-10-28 RX ORDER — TRAMADOL HYDROCHLORIDE 50 MG/1
50 TABLET ORAL EVERY 6 HOURS
Refills: 0 | Status: DISCONTINUED | OUTPATIENT
Start: 2022-10-28 | End: 2022-10-28

## 2022-10-28 RX ORDER — ASPIRIN/CALCIUM CARB/MAGNESIUM 324 MG
325 TABLET ORAL DAILY
Refills: 0 | Status: DISCONTINUED | OUTPATIENT
Start: 2022-10-29 | End: 2022-10-29

## 2022-10-28 RX ADMIN — ATORVASTATIN CALCIUM 40 MILLIGRAM(S): 80 TABLET, FILM COATED ORAL at 21:33

## 2022-10-28 RX ADMIN — Medication 1 APPLICATION(S): at 14:37

## 2022-10-28 RX ADMIN — OXYCODONE HYDROCHLORIDE 10 MILLIGRAM(S): 5 TABLET ORAL at 22:35

## 2022-10-28 RX ADMIN — SODIUM CHLORIDE 100 MILLILITER(S): 9 INJECTION INTRAMUSCULAR; INTRAVENOUS; SUBCUTANEOUS at 21:33

## 2022-10-28 RX ADMIN — OXYCODONE HYDROCHLORIDE 10 MILLIGRAM(S): 5 TABLET ORAL at 21:35

## 2022-10-28 RX ADMIN — ZOLPIDEM TARTRATE 5 MILLIGRAM(S): 10 TABLET ORAL at 00:13

## 2022-10-28 RX ADMIN — MUPIROCIN 1 APPLICATION(S): 20 OINTMENT TOPICAL at 05:34

## 2022-10-28 RX ADMIN — SODIUM CHLORIDE 75 MILLILITER(S): 9 INJECTION INTRAMUSCULAR; INTRAVENOUS; SUBCUTANEOUS at 00:24

## 2022-10-28 RX ADMIN — ZOLPIDEM TARTRATE 5 MILLIGRAM(S): 10 TABLET ORAL at 22:28

## 2022-10-28 RX ADMIN — Medication 100 MILLIGRAM(S): at 21:33

## 2022-10-28 NOTE — DISCHARGE NOTE PROVIDER - HOSPITAL COURSE
The patient underwent a RIGHT OPEN REDUCTION AND INTERNAL FIXATION on right distal radius on 10/28/22. The patient received antibiotics consistent with SCIP guidelines. The patient was medically cleared and underwent the procedure and had no intra-operative complications. Post-operatively, the patient was seen by medicine and PT. The patient received ASPIRIN for DVTP. The patient received pain medications per orthopedic pain management protocol and the pain was appropriately controlled. Patient was evaluated by PT and instructed on gait training. The patient was NON-weight bearing. The patient did not have any post-operative medical complications. The patient was discharged in stable condition.

## 2022-10-28 NOTE — PROGRESS NOTE ADULT - ASSESSMENT
76y Female with Remote Hx of Afrib s/p cardioversion long ago, she takes aspirin 325mg daily,, she presented with right wrist pain after mechanical fall, she went to see outpatient with Dr. Rogers, she had Xray done on 15th october showed impacted RIGHT distal radius metaphyseal fracture, Fractured ulnar styloid tip, she is s/p reduction, she had reduction done, she was still feeling pain was told by Dr Paul to come to [facility] ER today for surgery tomorrow, medicine consulted for the medical optimization, patient denies chest pain, sob, fever, chills, numbness or weakness of the hand, she denies hit her head or loosing consciousness, patient METS>8, RCRI is 0, Patient denies any exertional sob or chest pain, patient is at low risk for perioperative cardiovascular complications and is optimized from the medicine point of view for planned procedure, patient vitals, EKG and labs reviewed.       Plan:      RIGHT distal radius metaphyseal fracture, Fractured ulnar styloid tip:   - Planed procedure as per Ortho   - VS stable  - abx per ortho   - c/w IVF once npo   - opiate induced constipation regimen   - encouraging incentive spirometry post operatively.   -DVT prophylaxis and Pain meds as per Ortho team     Hx of HLD: statins.

## 2022-10-28 NOTE — DISCHARGE NOTE PROVIDER - NSDCMRMEDTOKEN_GEN_ALL_CORE_FT
Ambien 5 mg oral tablet: 1 tab(s) orally once a day (at bedtime) as needed  aspirin 325 mg oral tablet: 1 tab(s) orally once a day  atorvastatin 40 mg oral tablet: 1 tab(s) orally once a day  Centrum oral tablet: 1 tab(s) orally once a day  oxyCODONE 5 mg oral tablet: 1 tab(s) orally every 8 hours, As Needed MDD:15mg  sertraline 50 mg oral tablet: 1 tab(s) orally once a day   acetaminophen 325 mg oral tablet: 3 tab(s) orally every 8 hours, As Needed for pain  Ambien 5 mg oral tablet: 1 tab(s) orally once a day (at bedtime) as needed  Aspirin Enteric Coated 325 mg oral delayed release tablet: 1 tab(s) orally once a day MDD:1  atorvastatin 40 mg oral tablet: 1 tab(s) orally once a day  Centrum oral tablet: 1 tab(s) orally once a day  omeprazole 20 mg oral delayed release tablet: 1 tab(s) orally once a day MDD:1  oxyCODONE 5 mg oral tablet: 1 tab(s) orally every 6 hours as needed for pain MDD:4 tabs  Senna S 50 mg-8.6 mg oral tablet: 2 tab(s) orally once a day (at bedtime) MDD:2  sertraline 50 mg oral tablet: 1 tab(s) orally once a day

## 2022-10-28 NOTE — PROGRESS NOTE ADULT - SUBJECTIVE AND OBJECTIVE BOX
ORTHOPEDIC POST-OP PROGRESS NOTE:  Name: SUBHASH PORRAS    MR #: [id number]    Procedure: ORIF right distal radius   Surgeon: Dr. Rogers  DOS: 10/28      Pt comfortable without complaints, pain controlled. Patient with RUE in splint and has Right hand elevated. Patient states that she does not have feeling to her left digits 2-5 and is stating to having numbness to left thumb - most likely secondary to regional block patient received earlier. Denies CP, SOB, N/V.      Vital Signs Last 24 Hrs  T(C): 36.1 (10-28-22 @ 18:25), Max: 36.4 (10-28-22 @ 17:55)  T(F): 97 (10-28-22 @ 18:25), Max: 97.5 (10-28-22 @ 17:55)  HR: 66 (10-28-22 @ 19:00) (66 - 76)  BP: 126/63 (10-28-22 @ 19:00) (96/61 - 130/62)  BP(mean): 63 (10-28-22 @ 18:25) (63 - 100)  RR: 15 (10-28-22 @ 19:00) (12 - 19)  SpO2: 98% (10-28-22 @ 19:00) (97% - 100%)      General Exam:    General: Pt Alert and oriented, NAD, controlled pain.  RUE: + splint on and in correct position. RUE elevated. ACE clean dry intact with no bleeding noted. Decreased sensation to right digits 1-5. Decreased ROM to fingers most likely secondary to regional block. + Brisk capillary refill.             A/P: 76y Female  s/p ORIF right distal radius POD # 0    - Stable  - Pain Control  - DVT ppx as prescribed: Aspirin 325 daily  - PT eval  - Weight bearing status: NWB RUE ORTHOPEDIC POST-OP PROGRESS NOTE:  Name: SUBHASH PORRAS    MR #: [id number]    Procedure: ORIF right distal radius   Surgeon: Dr. Rogers  DOS: 10/28      Pt comfortable without complaints, pain controlled. Patient with RUE in splint and has Right hand elevated. Patient states that she does not have feeling to her left digits 2-5 and is stating to having numbness to left thumb - most likely secondary to regional block patient received earlier. Denies CP, SOB, N/V.      Vital Signs Last 24 Hrs  T(C): 36.1 (10-28-22 @ 18:25), Max: 36.4 (10-28-22 @ 17:55)  T(F): 97 (10-28-22 @ 18:25), Max: 97.5 (10-28-22 @ 17:55)  HR: 66 (10-28-22 @ 19:00) (66 - 76)  BP: 126/63 (10-28-22 @ 19:00) (96/61 - 130/62)  BP(mean): 63 (10-28-22 @ 18:25) (63 - 100)  RR: 15 (10-28-22 @ 19:00) (12 - 19)  SpO2: 98% (10-28-22 @ 19:00) (97% - 100%)      General Exam:    General: Pt Alert and oriented, NAD, controlled pain.  RUE: + splint on and in correct position. RUE elevated. ACE clean dry intact with no bleeding noted. Decreased sensation to right digits 1-5. Decreased ROM to fingers most likely secondary to regional block. + Brisk capillary refill.             A/P: 76y Female  s/p ORIF right distal radius POD # 0    - Keep right hand elevated   - Pain Control  - DVT ppx as prescribed: Aspirin 325 daily  - PT eval  - Weight bearing status: NWB RUE

## 2022-10-28 NOTE — DISCHARGE NOTE PROVIDER - NSDCFUADDINST_GEN_ALL_CORE_FT
The patient will be seen in the office between 1-2 weeks for splint removal and wound check. Sutures/Staples will be removed at that time. The patient will continue with splint as applied in hospital and not remove until re-evaluation in the office. The patient will contact the office if the wound becomes red, has increasing pain, develops bleeding or discharge, an injury occurs, or has other concerns. The patient will continue ASPIRIN for DVTP. The patient will take prescribed pain medicaiton for pain control and titrate according to prescription and patient needs. The patient is NON-weight bearing on the RIGHT UPPER extremity. The patient is recommended to elevated the affected extremity to reduce swelling. If the splint/cast  becomes too tight, the patient is to immediately elevate and contact the office to discuss further management or immediately proceed to the office if unable to make contact with the office.

## 2022-10-28 NOTE — DISCHARGE NOTE PROVIDER - CARE PROVIDER_API CALL
Dillon Rogers)  Orthopedics  [address] [place] [id number]  Phone: [phone]  Fax: [phone]  Follow Up Time:

## 2022-10-28 NOTE — PROGRESS NOTE ADULT - SUBJECTIVE AND OBJECTIVE BOX
SUBHASH PORRAS    [id number]    76y      Female    Patient is a 76y old  Female who presents with a chief complaint of right wrist pain (27 Oct 2022 18:00)      INTERVAL HPI/OVERNIGHT EVENTS:    Patient's pain is well controlled, denies fever, chills, chest pain.     REVIEW OF SYSTEMS:    CONSTITUTIONAL: No fever, some fatigue  RESPIRATORY: No cough, No shortness of breath  CARDIOVASCULAR: No chest pain, palpitations  GASTROINTESTINAL: No abdominal, No nausea, vomiting  NEUROLOGICAL: No headaches,  loss of strength.  MISCELLANEOUS: right wrist pain is well controlled         Vital Signs Last 24 Hrs  T(C): 36.3 (28 Oct 2022 14:10), Max: 36.9 (28 Oct 2022 00:30)  T(F): 97.4 (28 Oct 2022 14:10), Max: 98.5 (28 Oct 2022 00:30)  HR: 76 (28 Oct 2022 14:10) (68 - 98)  BP: 130/62 (28 Oct 2022 14:10) (119/78 - 157/95)  RR: 16 (28 Oct 2022 14:10) (16 - 19)  SpO2: 100% (28 Oct 2022 14:10) (94% - 100%)    Parameters below as of 28 Oct 2022 10:29  Patient On (Oxygen Delivery Method): room air        PHYSICAL EXAM:    GENERAL: Elderly female looking comfortable   HEENT: PERRL, +EOMI  NECK: soft, Supple, No JVD  CHEST/LUNG: Clear to auscultate bilaterally; No wheezing  HEART: S1S2+, Regular rate and rhythm; No murmurs  ABDOMEN: Soft, Nontender, Nondistended; Bowel sounds present  EXTREMITIES:  1+ Peripheral Pulses, No edema, right wrist with splint on  SKIN: No rashes or lesions  NEURO: AAOX3, no focal deficits  PSYCH: normal mood          LABS:                        14.5   8.06  )-----------( 355      ( 27 Oct 2022 14:40 )             43.1     10-27    139  |  101  |  16.1  ----------------------------<  76  4.2   |  25.0  |  0.50    Ca    10.0      27 Oct 2022 14:40    TPro  7.3  /  Alb  4.1  /  TBili  0.8  /  DBili  x   /  AST  23  /  ALT  22  /  AlkPhos  82  10-27    PT/INR - ( 27 Oct 2022 14:40 )   PT: 11.9 sec;   INR: 1.03 ratio         PTT - ( 27 Oct 2022 14:40 )  PTT:30.4 sec        I&O's Summary    27 Oct 2022 07:01  -  28 Oct 2022 07:00  --------------------------------------------------------  IN: 225 mL / OUT: 0 mL / NET: 225 mL        MEDICATIONS  (STANDING):  atorvastatin 40 milliGRAM(s) Oral at bedtime  atorvastatin 40 milliGRAM(s) Oral at bedtime  ceFAZolin   IVPB 2000 milliGRAM(s) IV Intermittent once  influenza  Vaccine (HIGH DOSE) 0.7 milliLiter(s) IntraMuscular once  mupirocin 2% Ointment 1 Application(s) Both Nostrils two times a day  sertraline 50 milliGRAM(s) Oral daily    MEDICATIONS  (PRN):  acetaminophen     Tablet .. 650 milliGRAM(s) Oral every 6 hours PRN Mild Pain (1 - 3)  HYDROmorphone   Tablet 4 milliGRAM(s) Oral every 3 hours PRN Severe Pain (7 - 10)  HYDROmorphone  Injectable 0.5 milliGRAM(s) IV Push every 3 hours PRN Breakthrough  oxyCODONE    IR 5 milliGRAM(s) Oral every 3 hours PRN Mild Pain (1 - 3)  zolpidem 5 milliGRAM(s) Oral at bedtime PRN Insomnia

## 2022-10-28 NOTE — DISCHARGE NOTE PROVIDER - NSDCFUSCHEDAPPT_GEN_ALL_CORE_FT
Uche Jefferson  [facility] Physician [facility]  [facility] [address]  Scheduled Appointment: 11/09/2022    Dillon Rogers  [facility]  ORTHOSURG [address]  Scheduled Appointment: 11/10/2022

## 2022-10-28 NOTE — BRIEF OPERATIVE NOTE - NSICDXBRIEFPROCEDURE_GEN_ALL_CORE_FT
PROCEDURES:  ORIF, fracture, radius, distal, extra-articular 28-Oct-2022 18:19:05 right Raghu Mahajan

## 2022-10-29 ENCOUNTER — TRANSCRIPTION ENCOUNTER (OUTPATIENT)
Age: 77
End: 2022-10-29

## 2022-10-29 VITALS
TEMPERATURE: 98 F | RESPIRATION RATE: 18 BRPM | DIASTOLIC BLOOD PRESSURE: 88 MMHG | OXYGEN SATURATION: 96 % | HEART RATE: 67 BPM | SYSTOLIC BLOOD PRESSURE: 131 MMHG

## 2022-10-29 PROCEDURE — 71045 X-RAY EXAM CHEST 1 VIEW: CPT

## 2022-10-29 PROCEDURE — 86850 RBC ANTIBODY SCREEN: CPT

## 2022-10-29 PROCEDURE — 36415 COLL VENOUS BLD VENIPUNCTURE: CPT

## 2022-10-29 PROCEDURE — 85730 THROMBOPLASTIN TIME PARTIAL: CPT

## 2022-10-29 PROCEDURE — 85610 PROTHROMBIN TIME: CPT

## 2022-10-29 PROCEDURE — 86900 BLOOD TYPING SEROLOGIC ABO: CPT

## 2022-10-29 PROCEDURE — 85025 COMPLETE CBC W/AUTO DIFF WBC: CPT

## 2022-10-29 PROCEDURE — 86901 BLOOD TYPING SEROLOGIC RH(D): CPT

## 2022-10-29 PROCEDURE — 99232 SBSQ HOSP IP/OBS MODERATE 35: CPT

## 2022-10-29 PROCEDURE — 82962 GLUCOSE BLOOD TEST: CPT

## 2022-10-29 PROCEDURE — U0005: CPT

## 2022-10-29 PROCEDURE — C1713: CPT

## 2022-10-29 PROCEDURE — 80053 COMPREHEN METABOLIC PANEL: CPT

## 2022-10-29 PROCEDURE — 99285 EMERGENCY DEPT VISIT HI MDM: CPT

## 2022-10-29 PROCEDURE — U0003: CPT

## 2022-10-29 RX ORDER — ASPIRIN/CALCIUM CARB/MAGNESIUM 324 MG
1 TABLET ORAL
Qty: 0 | Refills: 0 | DISCHARGE

## 2022-10-29 RX ORDER — SENNOSIDES/DOCUSATE SODIUM 8.6MG-50MG
2 TABLET ORAL
Qty: 14 | Refills: 0
Start: 2022-10-29 | End: 2022-11-04

## 2022-10-29 RX ORDER — OXYCODONE HYDROCHLORIDE 5 MG/1
1 TABLET ORAL
Qty: 20 | Refills: 0
Start: 2022-10-29 | End: 2022-11-02

## 2022-10-29 RX ORDER — OMEPRAZOLE 10 MG/1
1 CAPSULE, DELAYED RELEASE ORAL
Qty: 28 | Refills: 0
Start: 2022-10-29 | End: 2022-11-25

## 2022-10-29 RX ORDER — ACETAMINOPHEN 500 MG
3 TABLET ORAL
Qty: 0 | Refills: 0 | DISCHARGE
Start: 2022-10-29

## 2022-10-29 RX ORDER — ASPIRIN/CALCIUM CARB/MAGNESIUM 324 MG
1 TABLET ORAL
Qty: 28 | Refills: 0
Start: 2022-10-29 | End: 2022-11-25

## 2022-10-29 RX ADMIN — Medication 100 MILLIGRAM(S): at 02:21

## 2022-10-29 RX ADMIN — OXYCODONE HYDROCHLORIDE 10 MILLIGRAM(S): 5 TABLET ORAL at 02:40

## 2022-10-29 RX ADMIN — OXYCODONE HYDROCHLORIDE 10 MILLIGRAM(S): 5 TABLET ORAL at 06:00

## 2022-10-29 RX ADMIN — OXYCODONE HYDROCHLORIDE 10 MILLIGRAM(S): 5 TABLET ORAL at 07:00

## 2022-10-29 RX ADMIN — Medication 650 MILLIGRAM(S): at 11:22

## 2022-10-29 RX ADMIN — OXYCODONE HYDROCHLORIDE 10 MILLIGRAM(S): 5 TABLET ORAL at 01:40

## 2022-10-29 NOTE — OCCUPATIONAL THERAPY INITIAL EVALUATION ADULT - PLANNED THERAPY INTERVENTIONS, OT EVAL
ADL retraining/IADL retraining/fine motor coordination training/motor coordination training/neuromuscular re-education/ROM

## 2022-10-29 NOTE — OCCUPATIONAL THERAPY INITIAL EVALUATION ADULT - RANGE OF MOTION EXAMINATION, UPPER EXTREMITY
RUE AROM WFL at shoulder and elbow, wrist not assessed 2* injury/splinting, pt able to wiggle all digits-limited by swelling/Left UE Active ROM was WFL (within functional limits)

## 2022-10-29 NOTE — OCCUPATIONAL THERAPY INITIAL EVALUATION ADULT - PHYSICAL ASSIST/NONPHYSICAL ASSIST:DRESS UPPER BODY, OT EVAL
educated pt on compensatory dressing techniques, reviewed donning/doffing sling and adjusting. Pt reports spouse able to assist with task as needed/verbal cues/1 person assist

## 2022-10-29 NOTE — OCCUPATIONAL THERAPY INITIAL EVALUATION ADULT - MANUAL MUSCLE TESTING RESULTS, REHAB EVAL
ISELA WFL. RUE not assessed 2* NWB precautions at least 3/5 for shoulder, elbow, wrist/gross grasp not assessed. CRISTAL WFL

## 2022-10-29 NOTE — PROGRESS NOTE ADULT - SUBJECTIVE AND OBJECTIVE BOX
SUBHASH PORRAS    [id number]    76y      Female    Patient is a 76y old  Female who presents with a chief complaint of right wrist pain (29 Oct 2022 09:04)      INTERVAL HPI/OVERNIGHT EVENTS:    Patient's pain is well controlled, she is s/p surgery, denies chest pain, sob, dizziness, nausea, vomiting     REVIEW OF SYSTEMS:    CONSTITUTIONAL: No fever, some fatigue  RESPIRATORY: No cough, No shortness of breath  CARDIOVASCULAR: No chest pain, palpitations  GASTROINTESTINAL: No abdominal, No nausea, vomiting  NEUROLOGICAL: No headaches,  loss of strength  MISCELLANEOUS: right wrist pain is well controlled       Vital Signs Last 24 Hrs  T(C): 37.1 (29 Oct 2022 04:24), Max: 37.1 (28 Oct 2022 23:12)  T(F): 98.7 (29 Oct 2022 04:24), Max: 98.8 (28 Oct 2022 23:12)  HR: 78 (29 Oct 2022 04:24) (66 - 80)  BP: 139/76 (29 Oct 2022 04:24) (96/61 - 150/84)  BP(mean): 63 (28 Oct 2022 18:25) (63 - 100)  RR: 18 (29 Oct 2022 04:24) (12 - 19)  SpO2: 93% (29 Oct 2022 04:24) (93% - 100%)    Parameters below as of 29 Oct 2022 04:24  Patient On (Oxygen Delivery Method): room air        PHYSICAL EXAM:    GENERAL: Elderly female looking comfortable   HEENT: PERRL, +EOMI  NECK: soft, Supple, No JVD  CHEST/LUNG: Clear to auscultate bilaterally; No wheezing  HEART: S1S2+, Regular rate and rhythm; No murmurs  ABDOMEN: Soft, Nontender, Nondistended; Bowel sounds present  EXTREMITIES:  1+ Peripheral Pulses, No edema, right wrist with dressing and ACE bandage  SKIN: No rashes or lesions  NEURO: AAOX3, no focal deficits  PSYCH: normal mood      LABS:                        14.5   8.06  )-----------( 355      ( 27 Oct 2022 14:40 )             43.1     10-27    139  |  101  |  16.1  ----------------------------<  76  4.2   |  25.0  |  0.50    Ca    10.0      27 Oct 2022 14:40    TPro  7.3  /  Alb  4.1  /  TBili  0.8  /  DBili  x   /  AST  23  /  ALT  22  /  AlkPhos  82  10-27    PT/INR - ( 27 Oct 2022 14:40 )   PT: 11.9 sec;   INR: 1.03 ratio         PTT - ( 27 Oct 2022 14:40 )  PTT:30.4 sec        I&O's Summary    28 Oct 2022 07:01  -  29 Oct 2022 07:00  --------------------------------------------------------  IN: 0 mL / OUT: 200 mL / NET: -200 mL        MEDICATIONS  (STANDING):  aspirin 325 milliGRAM(s) Oral daily  atorvastatin 40 milliGRAM(s) Oral at bedtime  influenza  Vaccine (HIGH DOSE) 0.7 milliLiter(s) IntraMuscular once  sertraline 50 milliGRAM(s) Oral daily  sodium chloride 0.9%. 1000 milliLiter(s) (100 mL/Hr) IV Continuous <Continuous>    MEDICATIONS  (PRN):  acetaminophen     Tablet .. 650 milliGRAM(s) Oral every 6 hours PRN Mild Pain (1 - 3)  magnesium hydroxide Suspension 30 milliLiter(s) Oral daily PRN Constipation  ondansetron Injectable 4 milliGRAM(s) IV Push every 6 hours PRN Nausea and/or Vomiting  oxyCODONE    IR 5 milliGRAM(s) Oral every 4 hours PRN Moderate Pain (4 - 6)  oxyCODONE    IR 10 milliGRAM(s) Oral every 4 hours PRN Severe Pain (7 - 10)  zolpidem 5 milliGRAM(s) Oral at bedtime PRN Insomnia

## 2022-10-29 NOTE — DISCHARGE NOTE NURSING/CASE MANAGEMENT/SOCIAL WORK - PATIENT PORTAL LINK FT
You can access the FollowMyHealth Patient Portal offered by [facility] by registering at the following website: [email or web address]. By joining [employer]’s FollowMyHealth portal, you will also be able to view your health information using other applications (apps) compatible with our system.

## 2022-10-29 NOTE — OCCUPATIONAL THERAPY INITIAL EVALUATION ADULT - LIVES WITH, PROFILE
Pt reports lives in a condo with her spouse who is only able to minimally assist (pt spouse is recent double amputee, patient assists him as needed). Pt has no ROSALINA and no steps inside the home to negotiate/spouse

## 2022-10-29 NOTE — OCCUPATIONAL THERAPY INITIAL EVALUATION ADULT - PHYSICAL ASSIST/NONPHYSICAL ASSIST:DRESS LOWER BODY, OT EVAL
pt educated in compensatory dressing techniques, limited by impaired flexibility, pt reports spouse would be able to assist with task as needed. Pt also owns long handled reacher to assist with task as needed, verbalized ability/understanding to use device. Pt reports does not wear socks only slip on shoes./set-up required/verbal cues/1 person assist

## 2022-10-29 NOTE — OCCUPATIONAL THERAPY INITIAL EVALUATION ADULT - PHYSICAL ASSIST/NONPHYSICAL ASSIST: EATING, OT EVAL
Addended by: NEGRITA UMANA on: 6/26/2019 10:28 AM     Modules accepted: Orders    
pt requires assist with opening/manipulating containers and for bilateral tasks. Pt reports spouse will be able to assist as needed/set-up required

## 2022-10-29 NOTE — DISCHARGE NOTE NURSING/CASE MANAGEMENT/SOCIAL WORK - NSDCPEFALRISK_GEN_ALL_CORE
For information on Fall & Injury Prevention, visit: [email or web address] OR  [email or web address] OR  https://www.cdc.gov/steadi/patient.html

## 2022-10-29 NOTE — OCCUPATIONAL THERAPY INITIAL EVALUATION ADULT - ASSISTIVE DEVICE FOR TOILET TRANSFER, REHAB EVAL
pt reports has grab bars located on either side of comfort height toilet at home, able to use to assist with transfer/grab bars

## 2022-10-29 NOTE — OCCUPATIONAL THERAPY INITIAL EVALUATION ADULT - PERTINENT HX OF CURRENT PROBLEM, REHAB EVAL
Pt presented with R wrist pain s/p mechanical fall. Pt with R distal radius metaphyseal fx, fractured ulnar styloid tip.

## 2022-10-29 NOTE — OCCUPATIONAL THERAPY INITIAL EVALUATION ADULT - ADDITIONAL COMMENTS
Pt has a walk in shower with curtains and grab bars. Pt has grab bars by the toilet on both sides, comfort height toilets and owns RW, shower chair, cane, long handled reacher. Pt is right handed and drives. Pt states has a good support network of children/grand children/friends to assist with all needs. Pt independent with IADLs with main floor laundry

## 2022-10-29 NOTE — OCCUPATIONAL THERAPY INITIAL EVALUATION ADULT - GENERAL OBSERVATIONS, REHAB EVAL
Received pt semifowler in bed, NAD, +IV, +RUE splint/elevated, +on RA, A&Ox4 with sling bedside. Pre/post pain 7/10-RUE; RN aware. Safety/precautions maintained. NWB RUE reinforced. Patient agreeable to OT evaluation

## 2022-10-29 NOTE — PROGRESS NOTE ADULT - SUBJECTIVE AND OBJECTIVE BOX
Pt Name: SUBHASH PORRAS    MRN: [id number]    Patient is a being followed for right distal radius ORIF POD#1. Patient reports her pain is well controlled. Patient denies any motor sensory changes. Patient denies CP, SOB, N/V, fever/chills.     PAST MEDICAL & SURGICAL HISTORY:  PAST MEDICAL & SURGICAL HISTORY:  Osteoarthritis  ,A- fib in Jan 2014 and converted back to normal sinus by self and had a cardiac ablation done in april 2014.    ADARSH (obstructive sleep apnea)  does not use CPAP    Atrial fibrillation    Depression    S/P right knee arthroscopy  (2014),right  foot surgery (1999), B/L cataract surgey (2008),    S/P ablation of atrial fibrillation    After cataract, bilateral    Allergies: Percocet 10/325 (Vomiting)    Medications: acetaminophen     Tablet .. 650 milliGRAM(s) Oral every 6 hours PRN  aspirin 325 milliGRAM(s) Oral daily  atorvastatin 40 milliGRAM(s) Oral at bedtime  influenza  Vaccine (HIGH DOSE) 0.7 milliLiter(s) IntraMuscular once  magnesium hydroxide Suspension 30 milliLiter(s) Oral daily PRN  ondansetron Injectable 4 milliGRAM(s) IV Push every 6 hours PRN  oxyCODONE    IR 5 milliGRAM(s) Oral every 4 hours PRN  oxyCODONE    IR 10 milliGRAM(s) Oral every 4 hours PRN  sertraline 50 milliGRAM(s) Oral daily  sodium chloride 0.9%. 1000 milliLiter(s) IV Continuous <Continuous>  zolpidem 5 milliGRAM(s) Oral at bedtime PRN                        14.5   8.06  )-----------( 355      ( 27 Oct 2022 14:40 )             43.1     10-27    139  |  101  |  16.1  ----------------------------<  76  4.2   |  25.0  |  0.50    Ca    10.0      27 Oct 2022 14:40    TPro  7.3  /  Alb  4.1  /  TBili  0.8  /  DBili  x   /  AST  23  /  ALT  22  /  AlkPhos  82  10-27      PHYSICAL EXAM:    Vital Signs Last 24 Hrs  T(C): 37.1 (29 Oct 2022 04:24), Max: 37.1 (28 Oct 2022 23:12)  T(F): 98.7 (29 Oct 2022 04:24), Max: 98.8 (28 Oct 2022 23:12)  HR: 78 (29 Oct 2022 04:24) (66 - 80)  BP: 139/76 (29 Oct 2022 04:24) (96/61 - 150/84)  BP(mean): 63 (28 Oct 2022 18:25) (63 - 100)  RR: 18 (29 Oct 2022 04:24) (12 - 19)  SpO2: 93% (29 Oct 2022 04:24) (93% - 100%)    Parameters below as of 29 Oct 2022 04:24  Patient On (Oxygen Delivery Method): room air    Daily     Daily     Appearance: Alert, responsive, in no acute distress.  RUE: right wrist splint c/d/i, elevation pillow in place, +ROM digits intact, SILT, BCR, no cyanosis    A/P:  Pt is a  76y Female with right distal radius ORIF POD#1    PLAN:   * NWB RUE, elevate  * DVTP  * Pain Control  * ortho stable for d/c

## 2022-10-29 NOTE — PROGRESS NOTE ADULT - REASON FOR ADMISSION
right wrist pain

## 2022-10-29 NOTE — DISCHARGE NOTE NURSING/CASE MANAGEMENT/SOCIAL WORK - NSDCVIVACCINE_GEN_ALL_CORE_FT
influenza, injectable, quadrivalent, preservative free; 31-Oct-2014 18:02; Graciela Haney); [employer]; [id number]; IntraMuscular; Deltoid Left.; 0.5 milliLiter(s);

## 2022-10-29 NOTE — PROGRESS NOTE ADULT - ASSESSMENT
76y Female with Remote Hx of Afrib s/p cardioversion long ago, she takes aspirin 325mg daily,, she presented with right wrist pain after mechanical fall, she went to see outpatient with Dr. Rogers, she had Xray done on 15th october showed impacted RIGHT distal radius metaphyseal fracture, Fractured ulnar styloid tip, she is s/p reduction, she had reduction done, she was still feeling pain was told by Dr Paul to come to [facility] ER today for surgery tomorrow, medicine consulted for the medical optimization, patient denies chest pain, sob, fever, chills, numbness or weakness of the hand, she denies hit her head or loosing consciousness, patient METS>8, RCRI is 0, Patient denies any exertional sob or chest pain, patient is at low risk for perioperative cardiovascular complications and is optimized from the medicine point of view for planned procedure, patient vitals, EKG and labs reviewed.       Plan:      RIGHT distal radius metaphyseal fracture, Fractured ulnar styloid tip s/p ORIF post op day 01:    - Planed procedure as per Ortho   - VS stable  - abx per ortho   - opiate induced constipation regimen   - encouraging incentive spirometry post operatively.   -DVT prophylaxis and Pain meds as per Ortho team     Hx of HLD: statins.  76y Female with Remote Hx of Afrib s/p cardioversion long ago, she takes aspirin 325mg daily,, she presented with right wrist pain after mechanical fall, she went to see outpatient with Dr. Rogers, she had Xray done on 15th october showed impacted RIGHT distal radius metaphyseal fracture, Fractured ulnar styloid tip, she is s/p reduction, she had reduction done, she was still feeling pain was told by Dr Paul to come to [facility] ER today for surgery tomorrow, medicine consulted for the medical optimization, patient denies chest pain, sob, fever, chills, numbness or weakness of the hand, she denies hit her head or loosing consciousness, patient METS>8, RCRI is 0, Patient denies any exertional sob or chest pain, patient is at low risk for perioperative cardiovascular complications and is optimized from the medicine point of view for planned procedure, patient vitals, EKG and labs reviewed.       Plan:      RIGHT distal radius metaphyseal fracture, Fractured ulnar styloid tip s/p ORIF post op day 01:    - Planed procedure as per Ortho   - VS stable  - abx per ortho   - opiate induced constipation regimen   - encouraging incentive spirometry post operatively.   -DVT prophylaxis and Pain meds as per Ortho team     Hx of HLD: statins.     Patient is stable from the medicine point of view for discharge pending PT and Ortho eval.

## 2022-10-30 ENCOUNTER — NON-APPOINTMENT (OUTPATIENT)
Age: 77
End: 2022-10-30

## 2022-11-02 ENCOUNTER — APPOINTMENT (OUTPATIENT)
Dept: ORTHOPEDIC SURGERY | Facility: CLINIC | Age: 77
End: 2022-11-02

## 2022-11-02 PROCEDURE — 99024 POSTOP FOLLOW-UP VISIT: CPT

## 2022-11-02 NOTE — HISTORY OF PRESENT ILLNESS
[de-identified] : s/p right distal radius intraarticular 3 part fracture open reduction and internal fixation. Brachioradialis tendon lengthening. 10/28/22 [de-identified] : Peyton is here almost 1 week after her right distal radius open reduction internal fixation.  She denies much pain however she is significantly swollen today at the wrist and hand and fingers [de-identified] : On examination she has significant swelling throughout the wrist hand and fingers with bruising and ecchymosis.  She has mild blistering both dorsally and volarly.  There are no signs of infection.  The surgical sites are healing well.  There is no drainage at the incision sites.\par She has active finger extension as well as flexion.  She has intact sensation at all fingertips [de-identified] : Imaging of the right wrist shows excellent alignment of the distal radius fracture with hardware in place [de-identified] : Peyton is 1 week after her right distal radius fracture.  At this point I do not like the amount of swelling that she has.  She tells me that yesterday she was doing fine and this suddenly happened today. [de-identified] : 1.  I would like for her to commence seeing occupational therapy for gentle digital range of motion and edema control modalities\par \par #2 she will wear a right wrist fracture brace at all point in time\par \par #3 she will see me back in 1 week at which point we will obtain x-rays of her right wrist once again.

## 2022-11-09 ENCOUNTER — APPOINTMENT (OUTPATIENT)
Dept: ORTHOPEDIC SURGERY | Facility: CLINIC | Age: 77
End: 2022-11-09

## 2022-11-09 ENCOUNTER — APPOINTMENT (OUTPATIENT)
Dept: INTERNAL MEDICINE | Facility: CLINIC | Age: 77
End: 2022-11-09

## 2022-11-09 PROCEDURE — 99024 POSTOP FOLLOW-UP VISIT: CPT

## 2022-11-09 NOTE — HISTORY OF PRESENT ILLNESS
[de-identified] : s/p right distal radius intraarticular 3 part fracture open reduction and internal fixation. Brachioradialis tendon lengthening. 10/28/22. [de-identified] : Peyton is looking much better than the last time I saw her recently in the last week.  Her swelling has gone down dramatically.  Her pain is well controlled.  She is moving her fingers more.   [de-identified] : She is well-appearing on examination\par Examination of the right wrist reveals healing dorsal and volar incisions without any signs of infection.  prior blistering is now drying up.  She has motion both active and passive at all fingers with expected stiffness and swelling. [de-identified] : Imaging of the right wrist shows hardware in place with maintenance of the fracture reduction [de-identified] : Peyton is now doing much better than the last time I saw her.  I am delighted to see this. [de-identified] : 1.  I would like for her to continue wearing her fracture brace.  I also want her to continue moving her fingers to avoid swelling and stiffness\par \par #2 I would like to see her back in 2 weeks time for new x-rays of the right wrist and a clinical assessment

## 2022-11-13 ENCOUNTER — NON-APPOINTMENT (OUTPATIENT)
Age: 77
End: 2022-11-13

## 2022-11-21 ENCOUNTER — NON-APPOINTMENT (OUTPATIENT)
Age: 77
End: 2022-11-21

## 2022-11-22 ENCOUNTER — APPOINTMENT (OUTPATIENT)
Dept: ORTHOPEDIC SURGERY | Facility: CLINIC | Age: 77
End: 2022-11-22

## 2022-11-22 PROCEDURE — 99024 POSTOP FOLLOW-UP VISIT: CPT

## 2022-11-22 NOTE — HISTORY OF PRESENT ILLNESS
[de-identified] : s/p right distal intraarticular 3 part fracture open reduction and internal fixation. Brachioradialis tendon lengthening. 10/28/22 [de-identified] : Peyton returns with her daughter.  She is now around 4 weeks status post right distal radius ORIF.  Since I last saw her she is proving improving dramatically.  I am delighted to see this.  She also is. [de-identified] : She is well-appearing on examination\par Examination of the right wrist reveals well-healing incisions both volarly and dorsally.  There is mild swelling throughout the wrist into the hand and fingers.  This is much improved from the last assessment.  She has grossly almost full range of motion of the digits. [de-identified] : Imaging of the right distal radius shows a fracture to be excellently reduced with hardware in its intended place. [de-identified] : I am delighted to see that Peyton is heading in the right direction. [de-identified] : I would like to see her back in 4 weeks time.  She will continue wearing her postoperative splint.

## 2022-12-13 ENCOUNTER — APPOINTMENT (OUTPATIENT)
Dept: ORTHOPEDIC SURGERY | Facility: CLINIC | Age: 77
End: 2022-12-13

## 2022-12-13 PROCEDURE — 99024 POSTOP FOLLOW-UP VISIT: CPT

## 2022-12-13 RX ORDER — ACETAMINOPHEN 500 MG/1
500 TABLET, COATED ORAL
Qty: 180 | Refills: 0 | Status: ACTIVE | COMMUNITY
Start: 2022-12-13 | End: 1900-01-01

## 2022-12-13 NOTE — HISTORY OF PRESENT ILLNESS
[de-identified] : s/p right distal radius intraarticular 3 part fracture open reduction and internal fixation. Brachioradialis tendon lengthening. 10/28/22 [de-identified] : Peyton is now 6 weeks out from her right distal radius ORIF.  She is progressing marvelously.  She denies pain.  She is currently undergoing occupational therapy which is helping. [de-identified] : She is well-appearing on examination\par The dorsal and volar incisions are healing beautifully.  There is minimal swelling only.  There is almost full digital range of motion.  There is mild wrist stiffness [de-identified] : Peyton is doing excellently 6 weeks after her right distal radius fracture.  She denies any pain. [de-identified] : 1.  At this point she has been given a new removable wrist brace that should allow more motion as compared to her prior fracture brace.\par \par #2 she will continue with occupational therapy.\par \par #3 I would like to see her in 6 weeks time for a clinical assessment\par \par #4 we discussed NSAIDs and over-the-counter medications for pain control.  We discussed the risk profile

## 2023-01-03 ENCOUNTER — APPOINTMENT (OUTPATIENT)
Dept: ORTHOPEDIC SURGERY | Facility: CLINIC | Age: 78
End: 2023-01-03
Payer: MEDICARE

## 2023-01-03 DIAGNOSIS — M25.531 PAIN IN RIGHT WRIST: ICD-10-CM

## 2023-01-03 PROCEDURE — 99024 POSTOP FOLLOW-UP VISIT: CPT

## 2023-01-03 NOTE — HISTORY OF PRESENT ILLNESS
[de-identified] : s/p Right distal radius intraarticular 3 part fracture open reduction and internal fixation.\par  Brachioradialis tendon lengthening DOS: 10/28/22 [de-identified] : Peyton returns for follow-up of her known right distal radius fracture injury status post ORIF.  Overall she tells me that she has no discomfort. [de-identified] : She is well-appearing on examination\par Examination of the right wrist reveals minimal to no swelling.  Both incision sites are healing excellently dorsally and volarly.  There is good digital and wrist motion as compared to the contralateral side. [de-identified] : I am delighted to see that Peyton is doing so well after surgery.  At this point its been close to 9 weeks.  She can commence performing activities that require mild weightbearing. [de-identified] : 1.  I would like to see her back in 6 weeks as needed

## 2023-01-18 ENCOUNTER — RX RENEWAL (OUTPATIENT)
Age: 78
End: 2023-01-18

## 2023-01-18 ENCOUNTER — APPOINTMENT (OUTPATIENT)
Dept: INTERNAL MEDICINE | Facility: CLINIC | Age: 78
End: 2023-01-18
Payer: SELF-PAY

## 2023-01-18 ENCOUNTER — APPOINTMENT (OUTPATIENT)
Dept: INTERNAL MEDICINE | Facility: CLINIC | Age: 78
End: 2023-01-18
Payer: MEDICARE

## 2023-01-18 ENCOUNTER — LABORATORY RESULT (OUTPATIENT)
Age: 78
End: 2023-01-18

## 2023-01-18 VITALS
HEART RATE: 77 BPM | DIASTOLIC BLOOD PRESSURE: 80 MMHG | HEIGHT: 61 IN | RESPIRATION RATE: 14 BRPM | BODY MASS INDEX: 31.91 KG/M2 | OXYGEN SATURATION: 98 % | SYSTOLIC BLOOD PRESSURE: 125 MMHG | WEIGHT: 169 LBS

## 2023-01-18 DIAGNOSIS — R23.9 UNSPECIFIED SKIN CHANGES: ICD-10-CM

## 2023-01-18 DIAGNOSIS — Z23 ENCOUNTER FOR IMMUNIZATION: ICD-10-CM

## 2023-01-18 PROCEDURE — 90715 TDAP VACCINE 7 YRS/> IM: CPT

## 2023-01-18 PROCEDURE — 99214 OFFICE O/P EST MOD 30 MIN: CPT | Mod: 25

## 2023-01-18 PROCEDURE — 36415 COLL VENOUS BLD VENIPUNCTURE: CPT

## 2023-01-18 PROCEDURE — 90471 IMMUNIZATION ADMIN: CPT

## 2023-01-18 NOTE — PHYSICAL EXAM
[No Acute Distress] : no acute distress [No Respiratory Distress] : no respiratory distress  [Clear to Auscultation] : lungs were clear to auscultation bilaterally [Normal Rate] : normal rate  [Regular Rhythm] : with a regular rhythm [Normal Affect] : the affect was normal [Normal Mood] : the mood was normal [Normal Insight/Judgement] : insight and judgment were intact [de-identified] : + Slightly irregular raised white-colored lesion mid back pin sized

## 2023-01-18 NOTE — ASSESSMENT
[FreeTextEntry1] : TDAp given w/o reaction.Dermatology referral given .venipuncture done in our office, Labs sent out and further recommendations will be made based on lab results. Patient advised to continue present medications with diet/exercise and specialists followup.RTO in 3-4months for CP\par \par Dr. Jefferson was present in office building while I examined patient\par \par Discussed shingles vaccine, +got covid vaccines X3 \par Bone density-7/2021\par Mammogram-had yesterday–to call for report\par Colonoscopy 2/2018\par Endoscopy 1/18\par Specialists include\par 1. GI-Dr. Lang\par 2. Cardiology-Dr. Brooke\par 3. GYN-Dr. Zuñiga=has not been in years\par 4. Ophthalmology-Nelli MARTINEZ\par 5. Orthopedic p.r.n.-Dr. Read/Dr. Thompson/Dr. Rogers\par 6. Neurology-Dr. Ashraf\par 7. Vascular-Abraham Granados\par 8. Hepatologist-  "to do"\par Carotid sonogram via vascular\par Hepatitis C screening in the past was negative\par CT lung=N/A\par Abd sono 3/2020=normal\par echo 7/2021\par stress test 12/2021

## 2023-01-18 NOTE — HISTORY OF PRESENT ILLNESS
[FreeTextEntry1] : Obesity/PAF/hyperlipidemia [de-identified] : Patient presents for followup on hyperlipidemia/obesity/PAF. Patient is currently fasting for today's labs. Patient is currently on crestor/zetia for hyperlipidemia, on aspirin for PAF and has not made any lifestyle changes as of yet to improve her obesity. \par -got covid vaccines X3\par -s/p radial/ulnar fracture on the right s/p surgery 10/2022\par -Patient received flu vaccine at the pharmacy\par -Patient complaining of skin change mid back for a while\par \par \par

## 2023-01-19 ENCOUNTER — NON-APPOINTMENT (OUTPATIENT)
Age: 78
End: 2023-01-19

## 2023-01-19 LAB
ALBUMIN SERPL ELPH-MCNC: 4.3 G/DL
ALP BLD-CCNC: 78 U/L
ALT SERPL-CCNC: 27 U/L
ANION GAP SERPL CALC-SCNC: 11 MMOL/L
AST SERPL-CCNC: 24 U/L
BASOPHILS # BLD AUTO: 0.09 K/UL
BASOPHILS NFR BLD AUTO: 1.7 %
BILIRUB SERPL-MCNC: 1 MG/DL
BUN SERPL-MCNC: 21 MG/DL
CALCIUM SERPL-MCNC: 9.9 MG/DL
CHLORIDE SERPL-SCNC: 103 MMOL/L
CHOLEST SERPL-MCNC: 201 MG/DL
CO2 SERPL-SCNC: 27 MMOL/L
CREAT SERPL-MCNC: 0.65 MG/DL
EGFR: 91 ML/MIN/1.73M2
EOSINOPHIL # BLD AUTO: 0.27 K/UL
EOSINOPHIL NFR BLD AUTO: 5.2 %
ESTIMATED AVERAGE GLUCOSE: 108 MG/DL
GLUCOSE SERPL-MCNC: 88 MG/DL
HBA1C MFR BLD HPLC: 5.4 %
HCT VFR BLD CALC: 44.5 %
HDLC SERPL-MCNC: 77 MG/DL
HGB BLD-MCNC: 14 G/DL
LDLC SERPL CALC-MCNC: 105 MG/DL
LYMPHOCYTES # BLD AUTO: 1.56 K/UL
LYMPHOCYTES NFR BLD AUTO: 29.6 %
MAN DIFF?: NORMAL
MCHC RBC-ENTMCNC: 31.5 GM/DL
MCHC RBC-ENTMCNC: 33.9 PG
MCV RBC AUTO: 107.7 FL
MONOCYTES # BLD AUTO: 0.69 K/UL
MONOCYTES NFR BLD AUTO: 13.1 %
NEUTROPHILS # BLD AUTO: 2.66 K/UL
NEUTROPHILS NFR BLD AUTO: 50.4 %
NONHDLC SERPL-MCNC: 124 MG/DL
PLATELET # BLD AUTO: 317 K/UL
POTASSIUM SERPL-SCNC: 4.2 MMOL/L
PROT SERPL-MCNC: 7 G/DL
RBC # BLD: 4.13 M/UL
RBC # FLD: 14 %
SODIUM SERPL-SCNC: 141 MMOL/L
TRIGL SERPL-MCNC: 94 MG/DL
TSH SERPL-ACNC: 2.34 UIU/ML
WBC # FLD AUTO: 5.27 K/UL

## 2023-01-23 ENCOUNTER — NON-APPOINTMENT (OUTPATIENT)
Age: 78
End: 2023-01-23

## 2023-02-02 ENCOUNTER — APPOINTMENT (OUTPATIENT)
Dept: DERMATOLOGY | Facility: CLINIC | Age: 78
End: 2023-02-02
Payer: MEDICARE

## 2023-02-02 PROCEDURE — 17000 DESTRUCT PREMALG LESION: CPT

## 2023-02-02 PROCEDURE — 99203 OFFICE O/P NEW LOW 30 MIN: CPT | Mod: 25

## 2023-02-02 PROCEDURE — 17003 DESTRUCT PREMALG LES 2-14: CPT

## 2023-02-07 ENCOUNTER — APPOINTMENT (OUTPATIENT)
Dept: ORTHOPEDIC SURGERY | Facility: CLINIC | Age: 78
End: 2023-02-07

## 2023-02-18 NOTE — PATIENT PROFILE ADULT - NSPROPTRIGHTBILLOFRIGHTS_GEN_A_NUR
Episodes of diarrhea (more than 3 times a day), or if you are unable to tolerate food or fluids patient

## 2023-04-01 ENCOUNTER — NON-APPOINTMENT (OUTPATIENT)
Age: 78
End: 2023-04-01

## 2023-04-03 ENCOUNTER — NON-APPOINTMENT (OUTPATIENT)
Age: 78
End: 2023-04-03

## 2023-04-10 ENCOUNTER — APPOINTMENT (OUTPATIENT)
Dept: ORTHOPEDIC SURGERY | Facility: CLINIC | Age: 78
End: 2023-04-10
Payer: MEDICARE

## 2023-04-10 VITALS
DIASTOLIC BLOOD PRESSURE: 83 MMHG | HEART RATE: 73 BPM | SYSTOLIC BLOOD PRESSURE: 134 MMHG | HEIGHT: 61 IN | BODY MASS INDEX: 31.91 KG/M2 | WEIGHT: 169 LBS

## 2023-04-10 DIAGNOSIS — M25.511 PAIN IN RIGHT SHOULDER: ICD-10-CM

## 2023-04-10 PROCEDURE — 99214 OFFICE O/P EST MOD 30 MIN: CPT | Mod: 25

## 2023-04-10 PROCEDURE — 20610 DRAIN/INJ JOINT/BURSA W/O US: CPT | Mod: RT

## 2023-04-10 PROCEDURE — 73030 X-RAY EXAM OF SHOULDER: CPT | Mod: RT

## 2023-04-10 NOTE — HISTORY OF PRESENT ILLNESS
[FreeTextEntry1] : Peyton returns for follow-up today with a complaint of right shoulder pain that has been ongoing since October which was right around the time that she had her right upper extremity injury.  Since then she has been getting progressively weaker and her shoulder has become progressively more painful.

## 2023-04-10 NOTE — ASSESSMENT
[FreeTextEntry1] : ASSESSMENT:\par \par The patient comes in today with symptoms of right shoulder pain that have been present since a injury back in October 2022 since then the symptoms have gotten progressively worse with more pain and weakness which brings her in today for a checkup.\par She is exhibiting symptoms of tendinopathy impingement and possible tendon tearing.  I do recommend commencing physical therapy and an injection in hopes of nonoperative improvement\par [I have diagnosed the patient today with a new diagnosis - This may diminish bodily function for the extremity.] \par \par [For this I was able to review other physician’s note(s) including reviewing other tests, imaging results as well as personally view these results for my own interpretation.]\par \par Right SUBACROMIAL SHOULDER INJECTION\par \par Indication:  subacromial bursitis/impingement, pain\par \par Risk, benefits and alternatives were discussed with the patient. Potential complications include bleeding and infection. \par Alcohol was used to prep the area.  Ethyl chloride spray was used as a topical anesthetic.  Using sterile technique, the injection needle was then directed from a standard posterior approach parallel to and inferior to the acromion.\par A 21g needle was used to inject 5 mL of 1% Lidocaine and 2 mL Kenalog.  No significant resistance was encountered.   \par A bandage was applied.  The patient tolerated the procedure well.   \par \par Patient instructed to avoid strenuous activity for 2 day(s). \par Specifically counseled regarding the signs and symptoms of potential infection and instructed to present promptly to clinic or hospital if such signs and symptoms arise.\par \par The patient was adequately and thoroughly informed of my assessment of their current condition(s). \par \par DISCUSSION:\par 1.  I am hopeful that the injection to the right shoulder will help improve her symptomatology\par 2.  She will commence physical therapy and we will see each other again in 10 weeks\par \par

## 2023-04-10 NOTE — PHYSICAL EXAM
[de-identified] : She is well-appearing on examination\par Examination of the [right] shoulder reveals equal active and passive motion as compared to the contralateral side\par There is a positive Speed, positive Evita, positive Key, tenderness with anterior shoulder compression\par  [de-identified] : [4] views of [bilateral hands and wrists] were obtained today in my office and were seen by me and discussed with the patient. \par These show findings consistent with grossly preserved glenohumeral joint space with very minimal signs of degenerative joint disease inferiorly\par

## 2023-04-10 NOTE — REASON FOR VISIT
[Follow-Up Visit] : a follow-up visit for [Other: ____] : [unfilled] [FreeTextEntry2] : s/p Right distal radius intraarticular 3 part fracture open reduction and internal fixation.\par  Brachioradialis tendon lengthening DOS: 10/28/22. \par \par

## 2023-04-17 ENCOUNTER — LABORATORY RESULT (OUTPATIENT)
Age: 78
End: 2023-04-17

## 2023-04-17 ENCOUNTER — APPOINTMENT (OUTPATIENT)
Dept: INTERNAL MEDICINE | Facility: CLINIC | Age: 78
End: 2023-04-17
Payer: MEDICARE

## 2023-04-17 VITALS
HEART RATE: 78 BPM | DIASTOLIC BLOOD PRESSURE: 84 MMHG | SYSTOLIC BLOOD PRESSURE: 140 MMHG | HEIGHT: 61 IN | RESPIRATION RATE: 15 BRPM | WEIGHT: 168 LBS | BODY MASS INDEX: 31.72 KG/M2 | OXYGEN SATURATION: 98 %

## 2023-04-17 DIAGNOSIS — Z01.810 ENCOUNTER FOR PREPROCEDURAL CARDIOVASCULAR EXAMINATION: ICD-10-CM

## 2023-04-17 DIAGNOSIS — K70.30 ALCOHOLIC CIRRHOSIS OF LIVER W/OUT ASCITES: Chronic | ICD-10-CM

## 2023-04-17 DIAGNOSIS — E55.9 VITAMIN D DEFICIENCY, UNSPECIFIED: ICD-10-CM

## 2023-04-17 DIAGNOSIS — M85.80 OTHER SPECIFIED DISORDERS OF BONE DENSITY AND STRUCTURE, UNSPECIFIED SITE: ICD-10-CM

## 2023-04-17 DIAGNOSIS — I35.0 NONRHEUMATIC AORTIC (VALVE) STENOSIS: ICD-10-CM

## 2023-04-17 DIAGNOSIS — I77.810 THORACIC AORTIC ECTASIA: Chronic | ICD-10-CM

## 2023-04-17 DIAGNOSIS — R77.2 ABNORMALITY OF ALPHAFETOPROTEIN: ICD-10-CM

## 2023-04-17 DIAGNOSIS — Z01.818 ENCOUNTER FOR OTHER PREPROCEDURAL EXAMINATION: ICD-10-CM

## 2023-04-17 DIAGNOSIS — R07.89 OTHER CHEST PAIN: ICD-10-CM

## 2023-04-17 DIAGNOSIS — Z00.00 ENCOUNTER FOR GENERAL ADULT MEDICAL EXAMINATION W/OUT ABNORMAL FINDINGS: ICD-10-CM

## 2023-04-17 PROCEDURE — 36415 COLL VENOUS BLD VENIPUNCTURE: CPT

## 2023-04-17 PROCEDURE — G0439: CPT

## 2023-04-17 NOTE — COUNSELING
[Potential consequences of obesity discussed] : Potential consequences of obesity discussed [Benefits of weight loss discussed] : Benefits of weight loss discussed [Encouraged to increase physical activity] : Encouraged to increase physical activity [Healthy eating counseling provided] : healthy eating [Low Fat Diet] : Low fat diet [Decrease Portions] : Decrease food portions [Walking] : Walking [None] : None [Needs reinforcement, provided] : Patient needs reinforcement on understanding lifestyle changes and  the steps needed to achieve self management goals and reinforcement was provided [de-identified] : The patient again was told she should totally abstain from alcohol

## 2023-04-17 NOTE — HISTORY OF PRESENT ILLNESS
[FreeTextEntry1] : 77-year-old female presents for her yearly physical with history of atrial fibrillation for which she had ablation 2014 with cardiology followup and remains in sinus rhythm. For this she continues on an aspirin daily.\par \par Significant issue is December 2017 she was admitted and found to have acute hepatitis felt secondary to drug-induced liver injury secondary to Lipitor.\par Evaluation was done including MRIs which showed cirrhosis which was felt to be due to patient's chronic alcohol intake.\par Patient's liver functions have gone back to normal.\par She was following up with hepatology but has not seen them over 3 years\par Against medical advice the patient continues to drink alcohol stating she drinks about 4-5  days a week. She knows she he should not be drinking at all.\par \par The patient had known right carotid stenosis which progressed and she had a right carotid endarterectomy July 27, 2021 which went well.\par The patient saw vascular surgery January 2022 and states had carotid duplex.\par \par Patient with hyperlipidemia on Crestor and Zetia.  No associated liver issues\par \par She did have a right total knee replacement October 2014 which went fairly well but still has occasional discomfort with a known Baker's cyst.\par \par The patient has seen neurology in the past stating that she had multiple sclerosis and has had no progression of symptoms and the diagnosis is in question\par \par Otherwise she is feeling relatively well without chest pain, palpitations or shortness of breath.\par Her weight is about the same without any regular exercise regimen.\par \par The patient had issues with bowel changes and diarrhea and had GI workup with diagnosis of lymphocytic colitis. She follows with GI periodically.\par \par Status post fall October 2022 with fractured right wrist needing surgery with continued mild symptoms.  Now with issues of right shoulder and seeing orthopedic.\par Also complaining of increased issues left hip/knee.\par \par Patient with anxiety and depression on Zoloft 25 mg daily stating that she continues to have difficulty coping with mainly the care of her  who is totally dependent having had amputations.  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

## 2023-04-17 NOTE — ASSESSMENT
[FreeTextEntry1] : 77-year-old female with good general health with continued need for lifestyle changes with diet exercise and weight loss.\par A. fib with status post ablation and clinically remains in sinus rhythm.  Follow-up with cardiology as scheduled\par \par Patient status post acute hepatitis secondary to drug-induced liver injury likely secondary to Lipitor with return to normal liver functions.She was currently on rosuvastatin and Zetia without issues.\par \par Workup showed the patient to have liver cirrhosis likely secondary to chronic alcohol intake which was daily. \par the patient has been told on multiple occasions that she should be totally abstinent from drinking alcohol but continues to drink about 4-5 days a week. Again stressed the need that she needs to avoid alcohol.\par Refuses to follow-up with hepatology\par \par GI issues secondary to lymphocytic colitis which is relatively stable\par \par Significant right carotid stenosis which progressed and therefore patient had a right carotid endarterectomy July 27, 2021 with success.\par Follow-up with vascular surgery as scheduled\par \par Patient with chronic anxiety and depression on Zoloft 25 mg daily but continued issues therefore increased to 50 mg daily\par \par Bone density January 2018\par Mammography January 2023\par Colonoscopy March 2020\par Endoscopy January 2018\par GYN yearly\par \par Vaccines up to date (including hepatitis A and hepatitis B) other than shingles vaccine.\par received the influenza and COVID vaccines\par \par Venipuncture done today in the office\par \par Followup in 6 months\par \par

## 2023-04-17 NOTE — HEALTH RISK ASSESSMENT
[Yes] : Yes [No] : In the past 12 months have you used drugs other than those required for medical reasons? No [0] : 2) Feeling down, depressed, or hopeless: Not at all (0) [None] : None [With Significant Other] : lives with significant other [# of Members in Household ___] :  household currently consist of [unfilled] member(s) [Employed] : employed [High School] : high school [] :  [# Of Children ___] : has [unfilled] children [Sexually Active] : sexually active [Feels Safe at Home] : Feels safe at home [Fully functional (bathing, dressing, toileting, transferring, walking, feeding)] : Fully functional (bathing, dressing, toileting, transferring, walking, feeding) [Fully functional (using the telephone, shopping, preparing meals, housekeeping, doing laundry, using] : Fully functional and needs no help or supervision to perform IADLs (using the telephone, shopping, preparing meals, housekeeping, doing laundry, using transportation, managing medications and managing finances) [Reports changes in hearing] : Reports changes in hearing [Smoke Detector] : smoke detector [Carbon Monoxide Detector] : carbon monoxide detector [Seat Belt] :  uses seat belt [Sunscreen] : uses sunscreen [Reviewed no changes] : Reviewed, no changes [Former] : Former [Discussed at today's visit] : Advance Directives Discussed at today's visit [Designated Healthcare Proxy] : Designated healthcare proxy [Fair] :  ~his/her~ mood as fair [Any fall with injury in past year] : Patient reported fall with injury in the past year [PHQ-2 Negative - No further assessment needed] : PHQ-2 Negative - No further assessment needed [15-19] : 15-19 [> 15 Years] : > 15 Years [Name: ___] : Health Care Proxy's Name: [unfilled]  [Audit-CScore] : 0 [de-identified] : active [de-identified] : good [[id number]] : 0 [Change in mental status noted] : No change in mental status noted [Reports changes in vision] : Reports no changes in vision [Reports changes in dental health] : Reports no changes in dental health [Guns at Home] : no guns at home [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

## 2023-04-18 ENCOUNTER — APPOINTMENT (OUTPATIENT)
Dept: ORTHOPEDIC SURGERY | Facility: CLINIC | Age: 78
End: 2023-04-18
Payer: MEDICARE

## 2023-04-18 VITALS
WEIGHT: 168 LBS | DIASTOLIC BLOOD PRESSURE: 82 MMHG | SYSTOLIC BLOOD PRESSURE: 151 MMHG | HEART RATE: 75 BPM | BODY MASS INDEX: 32.98 KG/M2 | HEIGHT: 60 IN

## 2023-04-18 DIAGNOSIS — M70.62 TROCHANTERIC BURSITIS, LEFT HIP: ICD-10-CM

## 2023-04-18 PROCEDURE — 73502 X-RAY EXAM HIP UNI 2-3 VIEWS: CPT

## 2023-04-18 PROCEDURE — 99213 OFFICE O/P EST LOW 20 MIN: CPT

## 2023-04-18 RX ORDER — MELOXICAM 15 MG/1
15 TABLET ORAL
Qty: 30 | Refills: 2 | Status: ACTIVE | COMMUNITY
Start: 2023-04-18 | End: 1900-01-01

## 2023-04-18 NOTE — HISTORY OF PRESENT ILLNESS
[de-identified] : Patient is a 77-year-old female here today for evaluation of left lateral sided hip pain has been going on for the past few months.  Patient states that she has pain to the touch as well as with laying on her left hip.  It radiates down to the lateral aspect of the knee.  States that she has no groin pain or problems go up or down stairs or rising reseated position.  Takes Tylenol and Motrin with some relief of the pain.  Denies any recent history of trauma

## 2023-04-18 NOTE — PHYSICAL EXAM
[de-identified] : Musculoskeletal: ambulates normally. Left hip exam showed no groin pain with SLR, ROM is full without pain, AGAPITO negative, FADIR negative.  There is tenderness palpation over the left greater trochanter\par 5/5 motor strength in bilateral lower extremities. Sensory: Intact in bilateral lower extremities. DTRs: Biceps, brachioradialis, triceps, patellar, ankle and plantar 2+ and symmetric bilaterally. Pulses: dorsalis pedis, posterior tibial, femoral, popliteal, and radial 2+ and symmetric bilaterally. \par  [de-identified] : AP pelvis and 2 views of the left hip obtained the office today show no acute fracture or dislocation.  No significant degenerative changes noted

## 2023-04-18 NOTE — DISCUSSION/SUMMARY
[Medication Risks Reviewed] : Medication risks reviewed [Surgical risks reviewed] : Surgical risks reviewed [de-identified] : Patient is a 77-year-old female with left greater trochanteric bursitis presenting today for initial evaluation.  She has not yet tried conservative treatment I think that is warranted at this time.  I recommended ultrasound-guided cortisone injection into her left greater trochanteric bursa.  I recommended low impact activity and exercises.  I given her prescription for meloxicam 15 mg daily as needed for pain.  I will see her back on an as-needed basis for her left hip.  All questions were asked and answered

## 2023-04-19 LAB
25(OH)D3 SERPL-MCNC: 57.5 NG/ML
ALBUMIN SERPL ELPH-MCNC: 4.2 G/DL
ALP BLD-CCNC: 78 U/L
ALT SERPL-CCNC: 24 U/L
ANION GAP SERPL CALC-SCNC: 13 MMOL/L
APPEARANCE: CLEAR
AST SERPL-CCNC: 23 U/L
BACTERIA: NEGATIVE
BASOPHILS # BLD AUTO: 0.09 K/UL
BASOPHILS NFR BLD AUTO: 1.1 %
BILIRUB SERPL-MCNC: 0.8 MG/DL
BILIRUBIN URINE: NEGATIVE
BLOOD URINE: NEGATIVE
BUN SERPL-MCNC: 15 MG/DL
CALCIUM SERPL-MCNC: 9.9 MG/DL
CHLORIDE SERPL-SCNC: 104 MMOL/L
CHOLEST SERPL-MCNC: 174 MG/DL
CO2 SERPL-SCNC: 24 MMOL/L
COLOR: YELLOW
CREAT SERPL-MCNC: 0.66 MG/DL
EGFR: 91 ML/MIN/1.73M2
EOSINOPHIL # BLD AUTO: 0.29 K/UL
EOSINOPHIL NFR BLD AUTO: 3.5 %
ESTIMATED AVERAGE GLUCOSE: 108 MG/DL
FOLATE SERPL-MCNC: 15.5 NG/ML
GLUCOSE QUALITATIVE U: NEGATIVE
GLUCOSE SERPL-MCNC: 96 MG/DL
HBA1C MFR BLD HPLC: 5.4 %
HCT VFR BLD CALC: 45.4 %
HDLC SERPL-MCNC: 81 MG/DL
HGB BLD-MCNC: 13.9 G/DL
HYALINE CASTS: 1 /LPF
IMM GRANULOCYTES NFR BLD AUTO: 0.4 %
KETONES URINE: NEGATIVE
LDLC SERPL CALC-MCNC: 77 MG/DL
LEUKOCYTE ESTERASE URINE: NEGATIVE
LYMPHOCYTES # BLD AUTO: 1.36 K/UL
LYMPHOCYTES NFR BLD AUTO: 16.4 %
MAN DIFF?: NORMAL
MCHC RBC-ENTMCNC: 30.6 GM/DL
MCHC RBC-ENTMCNC: 32.3 PG
MCV RBC AUTO: 105.6 FL
MICROSCOPIC-UA: NORMAL
MONOCYTES # BLD AUTO: 0.85 K/UL
MONOCYTES NFR BLD AUTO: 10.2 %
NEUTROPHILS # BLD AUTO: 5.68 K/UL
NEUTROPHILS NFR BLD AUTO: 68.4 %
NITRITE URINE: NEGATIVE
NONHDLC SERPL-MCNC: 93 MG/DL
PH URINE: 6
PLATELET # BLD AUTO: 382 K/UL
POTASSIUM SERPL-SCNC: 4.7 MMOL/L
PROT SERPL-MCNC: 7 G/DL
PROTEIN URINE: NORMAL
RBC # BLD: 4.3 M/UL
RBC # FLD: 13.3 %
RED BLOOD CELLS URINE: 1 /HPF
SODIUM SERPL-SCNC: 142 MMOL/L
SPECIFIC GRAVITY URINE: 1.02
SQUAMOUS EPITHELIAL CELLS: 1 /HPF
TRIGL SERPL-MCNC: 77 MG/DL
UROBILINOGEN URINE: NORMAL
VIT B12 SERPL-MCNC: 1052 PG/ML
WBC # FLD AUTO: 8.3 K/UL
WHITE BLOOD CELLS URINE: 2 /HPF

## 2023-04-20 LAB
ALPHA-1-FETOPROTEIN-L3: NORMAL %
ALPHA-1-FETOPROTEIN: 3.4 NG/ML

## 2023-05-02 ENCOUNTER — NON-APPOINTMENT (OUTPATIENT)
Age: 78
End: 2023-05-02

## 2023-05-08 ENCOUNTER — NON-APPOINTMENT (OUTPATIENT)
Age: 78
End: 2023-05-08

## 2023-05-08 ENCOUNTER — OFFICE (OUTPATIENT)
Dept: URBAN - METROPOLITAN AREA CLINIC 115 | Facility: CLINIC | Age: 78
Setting detail: OPHTHALMOLOGY
End: 2023-05-08
Payer: MEDICARE

## 2023-05-08 DIAGNOSIS — H52.7: ICD-10-CM

## 2023-05-08 DIAGNOSIS — H04.123: ICD-10-CM

## 2023-05-08 DIAGNOSIS — H35.3131: ICD-10-CM

## 2023-05-08 DIAGNOSIS — Z96.1: ICD-10-CM

## 2023-05-08 PROCEDURE — 92014 COMPRE OPH EXAM EST PT 1/>: CPT | Performed by: OPHTHALMOLOGY

## 2023-05-08 PROCEDURE — 92015 DETERMINE REFRACTIVE STATE: CPT | Performed by: OPHTHALMOLOGY

## 2023-05-08 ASSESSMENT — REFRACTION_MANIFEST
OD_CYLINDER: -0.25
OS_ADD: +2.50
OS_SPHERE: +1.00
OD_ADD: +2.50
OS_VA1: 20/30
OD_SPHERE: +0.75
OS_ADD: +2.50
OS_AXIS: 1220
OS_ADD: +3.50
OD_SPHERE: +1.00
OS_SPHERE: +1.00
OD_VA1: 20/20
OD_ADD: +2.50
OS_VA1: 20/30-2
OS_SPHERE: +2.00
OD_SPHERE: +1.00
OS_CYLINDER: SPH
OD_VA1: 20/25--2
OD_AXIS: 090
OD_CYLINDER: SPH
OS_AXIS: 120
OD_AXIS: 090
OS_CYLINDER: -0.50
OD_SPHERE: +1.00
OS_CYLINDER: -0.50
OD_ADD: +3.50
OS_VA1: 20/30-2
OD_ADD: +2.50
OS_SPHERE: +2.00
OS_ADD: +2.50
OD_VA1: 20/20-
OD_CYLINDER: -0.25

## 2023-05-08 ASSESSMENT — REFRACTION_CURRENTRX
OS_SPHERE: +2.75
OS_OVR_VA: 20/
OS_VPRISM_DIRECTION: SV
OD_AXIS: 118
OS_OVR_VA: 20/
OD_SPHERE: +2.75
OS_SPHERE: +3.75
OD_VPRISM_DIRECTION: SV
OD_CYLINDER: -0.25
OD_OVR_VA: 20/
OS_AXIS: 180
OD_SPHERE: +3.25
OD_OVR_VA: 20/
OS_CYLINDER: 0.00

## 2023-05-08 ASSESSMENT — VISUAL ACUITY
OD_BCVA: 20/60
OS_BCVA: 20/30-

## 2023-05-08 ASSESSMENT — SPHEQUIV_DERIVED
OS_SPHEQUIV: 0.75
OS_SPHEQUIV: 1.75
OS_SPHEQUIV: 1.875
OD_SPHEQUIV: 0.875
OD_SPHEQUIV: 0.875

## 2023-05-08 ASSESSMENT — REFRACTION_AUTOREFRACTION
OS_AXIS: 126
OD_SPHERE: +0.75
OS_CYLINDER: -0.25
OS_SPHERE: +2.00

## 2023-05-08 ASSESSMENT — TONOMETRY
OS_IOP_MMHG: 19
OD_IOP_MMHG: 19

## 2023-05-08 ASSESSMENT — CONFRONTATIONAL VISUAL FIELD TEST (CVF)
OD_FINDINGS: FULL
OS_FINDINGS: FULL

## 2023-05-21 ENCOUNTER — NON-APPOINTMENT (OUTPATIENT)
Age: 78
End: 2023-05-21

## 2023-06-03 ENCOUNTER — EMERGENCY (EMERGENCY)
Facility: HOSPITAL | Age: 78
LOS: 1 days | Discharge: HOME | End: 2023-06-03
Attending: EMERGENCY MEDICINE
Payer: COMMERCIAL

## 2023-06-03 VITALS
TEMPERATURE: 98 F | WEIGHT: 160.06 LBS | SYSTOLIC BLOOD PRESSURE: 156 MMHG | RESPIRATION RATE: 16 BRPM | DIASTOLIC BLOOD PRESSURE: 82 MMHG | HEART RATE: 69 BPM | OXYGEN SATURATION: 95 %

## 2023-06-03 DIAGNOSIS — H26.40 UNSPECIFIED SECONDARY CATARACT: Chronic | ICD-10-CM

## 2023-06-03 DIAGNOSIS — Z98.89 OTHER SPECIFIED POSTPROCEDURAL STATES: Chronic | ICD-10-CM

## 2023-06-03 LAB
ALBUMIN SERPL ELPH-MCNC: 3.7 G/DL — SIGNIFICANT CHANGE UP (ref 3.3–5.2)
ALP SERPL-CCNC: 63 U/L — SIGNIFICANT CHANGE UP (ref 40–120)
ALT FLD-CCNC: 31 U/L — SIGNIFICANT CHANGE UP
ANION GAP SERPL CALC-SCNC: 10 MMOL/L — SIGNIFICANT CHANGE UP (ref 5–17)
APPEARANCE UR: CLEAR — SIGNIFICANT CHANGE UP
AST SERPL-CCNC: 32 U/L — HIGH
BASOPHILS # BLD AUTO: 0.06 K/UL — SIGNIFICANT CHANGE UP (ref 0–0.2)
BASOPHILS NFR BLD AUTO: 1.2 % — SIGNIFICANT CHANGE UP (ref 0–2)
BILIRUB SERPL-MCNC: 1 MG/DL — SIGNIFICANT CHANGE UP (ref 0.4–2)
BILIRUB UR-MCNC: NEGATIVE — SIGNIFICANT CHANGE UP
BUN SERPL-MCNC: 18.2 MG/DL — SIGNIFICANT CHANGE UP (ref 8–20)
CALCIUM SERPL-MCNC: 9.1 MG/DL — SIGNIFICANT CHANGE UP (ref 8.4–10.5)
CHLORIDE SERPL-SCNC: 104 MMOL/L — SIGNIFICANT CHANGE UP (ref 96–108)
CO2 SERPL-SCNC: 25 MMOL/L — SIGNIFICANT CHANGE UP (ref 22–29)
COLOR SPEC: YELLOW — SIGNIFICANT CHANGE UP
CREAT SERPL-MCNC: 0.6 MG/DL — SIGNIFICANT CHANGE UP (ref 0.5–1.3)
DIFF PNL FLD: NEGATIVE — SIGNIFICANT CHANGE UP
EGFR: 93 ML/MIN/1.73M2 — SIGNIFICANT CHANGE UP
EOSINOPHIL # BLD AUTO: 0.21 K/UL — SIGNIFICANT CHANGE UP (ref 0–0.5)
EOSINOPHIL NFR BLD AUTO: 4.2 % — SIGNIFICANT CHANGE UP (ref 0–6)
EPI CELLS # UR: SIGNIFICANT CHANGE UP
GLUCOSE SERPL-MCNC: 96 MG/DL — SIGNIFICANT CHANGE UP (ref 70–99)
GLUCOSE UR QL: NEGATIVE MG/DL — SIGNIFICANT CHANGE UP
HCT VFR BLD CALC: 40.5 % — SIGNIFICANT CHANGE UP (ref 34.5–45)
HGB BLD-MCNC: 13.3 G/DL — SIGNIFICANT CHANGE UP (ref 11.5–15.5)
IMM GRANULOCYTES NFR BLD AUTO: 0.2 % — SIGNIFICANT CHANGE UP (ref 0–0.9)
KETONES UR-MCNC: NEGATIVE — SIGNIFICANT CHANGE UP
LEUKOCYTE ESTERASE UR-ACNC: NEGATIVE — SIGNIFICANT CHANGE UP
LYMPHOCYTES # BLD AUTO: 1.45 K/UL — SIGNIFICANT CHANGE UP (ref 1–3.3)
LYMPHOCYTES # BLD AUTO: 28.7 % — SIGNIFICANT CHANGE UP (ref 13–44)
MCHC RBC-ENTMCNC: 32.6 PG — SIGNIFICANT CHANGE UP (ref 27–34)
MCHC RBC-ENTMCNC: 32.8 GM/DL — SIGNIFICANT CHANGE UP (ref 32–36)
MCV RBC AUTO: 99.3 FL — SIGNIFICANT CHANGE UP (ref 80–100)
MONOCYTES # BLD AUTO: 0.82 K/UL — SIGNIFICANT CHANGE UP (ref 0–0.9)
MONOCYTES NFR BLD AUTO: 16.2 % — HIGH (ref 2–14)
NEUTROPHILS # BLD AUTO: 2.51 K/UL — SIGNIFICANT CHANGE UP (ref 1.8–7.4)
NEUTROPHILS NFR BLD AUTO: 49.5 % — SIGNIFICANT CHANGE UP (ref 43–77)
NITRITE UR-MCNC: NEGATIVE — SIGNIFICANT CHANGE UP
PH UR: 8 — SIGNIFICANT CHANGE UP (ref 5–8)
PLATELET # BLD AUTO: 280 K/UL — SIGNIFICANT CHANGE UP (ref 150–400)
POTASSIUM SERPL-MCNC: 4.2 MMOL/L — SIGNIFICANT CHANGE UP (ref 3.5–5.3)
POTASSIUM SERPL-SCNC: 4.2 MMOL/L — SIGNIFICANT CHANGE UP (ref 3.5–5.3)
PROT SERPL-MCNC: 6.4 G/DL — LOW (ref 6.6–8.7)
PROT UR-MCNC: 15
RBC # BLD: 4.08 M/UL — SIGNIFICANT CHANGE UP (ref 3.8–5.2)
RBC # FLD: 15.2 % — HIGH (ref 10.3–14.5)
RBC CASTS # UR COMP ASSIST: SIGNIFICANT CHANGE UP /HPF (ref 0–4)
SODIUM SERPL-SCNC: 138 MMOL/L — SIGNIFICANT CHANGE UP (ref 135–145)
SP GR SPEC: 1.01 — SIGNIFICANT CHANGE UP (ref 1.01–1.02)
UROBILINOGEN FLD QL: NEGATIVE MG/DL — SIGNIFICANT CHANGE UP
WBC # BLD: 5.06 K/UL — SIGNIFICANT CHANGE UP (ref 3.8–10.5)
WBC # FLD AUTO: 5.06 K/UL — SIGNIFICANT CHANGE UP (ref 3.8–10.5)
WBC UR QL: SIGNIFICANT CHANGE UP /HPF (ref 0–5)

## 2023-06-03 PROCEDURE — 99283 EMERGENCY DEPT VISIT LOW MDM: CPT

## 2023-06-03 PROCEDURE — 81001 URINALYSIS AUTO W/SCOPE: CPT

## 2023-06-03 PROCEDURE — 87086 URINE CULTURE/COLONY COUNT: CPT

## 2023-06-03 PROCEDURE — 80053 COMPREHEN METABOLIC PANEL: CPT

## 2023-06-03 PROCEDURE — 36415 COLL VENOUS BLD VENIPUNCTURE: CPT

## 2023-06-03 PROCEDURE — 99284 EMERGENCY DEPT VISIT MOD MDM: CPT

## 2023-06-03 PROCEDURE — 85025 COMPLETE CBC W/AUTO DIFF WBC: CPT

## 2023-06-03 RX ORDER — SODIUM CHLORIDE 9 MG/ML
1000 INJECTION, SOLUTION INTRAVENOUS ONCE
Refills: 0 | Status: COMPLETED | OUTPATIENT
Start: 2023-06-03 | End: 2023-06-03

## 2023-06-03 RX ADMIN — SODIUM CHLORIDE 1000 MILLILITER(S): 9 INJECTION, SOLUTION INTRAVENOUS at 11:28

## 2023-06-03 NOTE — ED PROVIDER NOTE - ATTENDING CONTRIBUTION TO CARE
I personally saw the patient with the resident, and completed the key components of the history and physical exam. I then discussed the management plan with the resident.      77-year-old female with past medical history A-fib presents for dysuria x2 weeks, intermittent confusion, generalized weakness.  States she was prescribed Keflex for UTI from [facility] urgent care, took a complete course, but symptoms persist.  Denies fevers,  abdominal pain, back pain.    NAD, well-appearing, ABD soft, nontender, nondistended, no CVA tenderness.    We will check basic labs, urine, fluids, will treat UTI as indicated.

## 2023-06-03 NOTE — ED PROVIDER NOTE - OBJECTIVE STATEMENT
77y Female with history of afib s/p cardioversion presenting with painful urination x 2 weeks with intermittent confusion and weakness. Patient reports she was recently treated for a UTI however still has symptoms. Denies fevers, chills, headache, chest pain, palpitations, shortness of breath, cough, nausea, vomiting, diarrhea, hematuria, dark stools, focal neurologic symptoms.

## 2023-06-03 NOTE — ED PROVIDER NOTE - PATIENT PORTAL LINK FT
You can access the FollowMyHealth Patient Portal offered by [facility] by registering at the following website: [email or web address]. By joining [employer]’s FollowMyHealth portal, you will also be able to view your health information using other applications (apps) compatible with our system.

## 2023-06-03 NOTE — ED ADULT NURSE NOTE - NSFALLUNIVINTERV_ED_ALL_ED
Bed/Stretcher in lowest position, wheels locked, appropriate side rails in place/Call bell, personal items and telephone in reach/Instruct patient to call for assistance before getting out of bed/chair/stretcher/Non-slip footwear applied when patient is off stretcher/[place] to call system/Physically safe environment - no spills, clutter or unnecessary equipment/Purposeful proactive rounding/Room/bathroom lighting operational, light cord in reach

## 2023-06-03 NOTE — ED ADULT NURSE NOTE - OBJECTIVE STATEMENT
pt states daughter felt her speech was slurred but she was speaking while brushing her teeth. pt states she has been under a lot of stress with her husbands health and busy work schedule. pt denies numbness/tingling, weakness. MANUEL X 4, strength equal. ambulating w/ steady gait

## 2023-06-03 NOTE — ED PROVIDER NOTE - NSFOLLOWUPINSTRUCTIONS_ED_ALL_ED_FT
Please follow-up with your primary care doctor.  Please call for an appointment in the next 48 hours but if you cannot follow-up with your primary care doctor please return to the Emergency Department for any urgent issues.    You were given a copy of the tests performed today.  Please bring the results with you and review them with your primary care doctor.    If you have any worsening of symptoms or any other concerns please return to the Emergency Department immediately.    Please continue taking your home medications as directed.

## 2023-06-03 NOTE — ED PROVIDER NOTE - CLINICAL SUMMARY MEDICAL DECISION MAKING FREE TEXT BOX
77y Female with painful urination, intermittent confusion and weakness in the setting of recent treated UTI. No fevers, abdominal pain, back pain. Afebrile, hemodynamically stable, neurovascularly intact, abdomen soft and nontender. Differential diagnosis includes but not limited to UTI, electrolyte derangement. 77y Female with painful urination, intermittent confusion and weakness in the setting of recent treated UTI. No fevers, abdominal pain, back pain. Afebrile, hemodynamically stable, neurovascularly intact, abdomen soft and nontender. Differential diagnosis includes but not limited to UTI, electrolyte derangement. Labs and imaging independently reviewed and interpreted with no acute pathology noted. Improved symptoms after IVF. Discharge with pcp follow up.

## 2023-06-03 NOTE — ED ADULT TRIAGE NOTE - CHIEF COMPLAINT QUOTE
Patient BIBEMS c/o weakness + impaired gait x2 weeks. Pt states she had a UTI last week. As per EMS the family states the patient's speech is not the same either. Pt's speech is clear in triage.

## 2023-06-04 LAB
CULTURE RESULTS: SIGNIFICANT CHANGE UP
SPECIMEN SOURCE: SIGNIFICANT CHANGE UP

## 2023-06-07 ENCOUNTER — APPOINTMENT (OUTPATIENT)
Dept: INTERNAL MEDICINE | Facility: CLINIC | Age: 78
End: 2023-06-07
Payer: MEDICARE

## 2023-06-07 VITALS
BODY MASS INDEX: 33.18 KG/M2 | SYSTOLIC BLOOD PRESSURE: 120 MMHG | OXYGEN SATURATION: 96 % | RESPIRATION RATE: 13 BRPM | WEIGHT: 169 LBS | HEART RATE: 80 BPM | HEIGHT: 60 IN | DIASTOLIC BLOOD PRESSURE: 80 MMHG

## 2023-06-07 DIAGNOSIS — Z09 ENCOUNTER FOR FOLLOW-UP EXAMINATION AFTER COMPLETED TREATMENT FOR CONDITIONS OTHER THAN MALIGNANT NEOPLASM: ICD-10-CM

## 2023-06-07 PROCEDURE — 99214 OFFICE O/P EST MOD 30 MIN: CPT

## 2023-06-07 NOTE — ASSESSMENT
[FreeTextEntry1] : Patient clinically well other than fatigue secondary to current situation at home.  Patient will call with any issues and return to the office as scheduled for regular follow-up\par \par \par Attending MD available via phone if needed

## 2023-06-07 NOTE — HISTORY OF PRESENT ILLNESS
[FreeTextEntry2] : Patient presents status post [facility] ED.  Patient presented on 6/3 via ambulance.  Patient presented with dysuria x2 weeks, intermittent confusion and generalized weakness.  Patient was previously prescribed Keflex for UTI by urgent care, completed course but persisted with symptoms.  Patient had blood work/urine checked in the ED, symptoms improved after IV fluids and patient was felt stable for discharge\par \par Hospital records show\par -CMP/CBC = normal\par -Normal except trace protein, culture was negative\par \par Patient states overall she is feeling fine, mild fatigue but attributes that partly to her current life situation as her  is an amputee.\par Patient's depression is currently controlled on Zoloft\par Denies dysuria/hematuria or frequency at this time\par "Was told to follow-up post hospital therefore here"\par Had urine repeated and Pap test via GYN which patient reports as normal

## 2023-06-07 NOTE — PHYSICAL EXAM
[No Acute Distress] : no acute distress [No Respiratory Distress] : no respiratory distress  [Clear to Auscultation] : lungs were clear to auscultation bilaterally [Regular Rhythm] : with a regular rhythm [Normal Rate] : normal rate  [Normal Affect] : the affect was normal [Normal Mood] : the mood was normal [No Focal Deficits] : no focal deficits

## 2023-07-06 ENCOUNTER — APPOINTMENT (OUTPATIENT)
Dept: ORTHOPEDIC SURGERY | Facility: CLINIC | Age: 78
End: 2023-07-06
Payer: MEDICARE

## 2023-07-06 VITALS
HEIGHT: 60 IN | DIASTOLIC BLOOD PRESSURE: 90 MMHG | WEIGHT: 169 LBS | SYSTOLIC BLOOD PRESSURE: 145 MMHG | HEART RATE: 82 BPM | BODY MASS INDEX: 33.18 KG/M2

## 2023-07-06 DIAGNOSIS — M25.511 PAIN IN RIGHT SHOULDER: ICD-10-CM

## 2023-07-06 DIAGNOSIS — M75.90 BURSITIS OF UNSPECIFIED SHOULDER: ICD-10-CM

## 2023-07-06 DIAGNOSIS — M75.50 BURSITIS OF UNSPECIFIED SHOULDER: ICD-10-CM

## 2023-07-06 PROCEDURE — 20610 DRAIN/INJ JOINT/BURSA W/O US: CPT | Mod: RT

## 2023-07-06 PROCEDURE — 99214 OFFICE O/P EST MOD 30 MIN: CPT | Mod: 25

## 2023-07-06 NOTE — PHYSICAL EXAM
[de-identified] : She is well-appearing on examination\par Examination of the [right] shoulder reveals equal active and passive motion as compared to the contralateral side\par There is a positive Speed, positive Evita, positive Key, tenderness with anterior shoulder compression\par

## 2023-07-06 NOTE — REASON FOR VISIT
[Follow-Up Visit] : a follow-up visit for [Other: ____] : [unfilled] [FreeTextEntry2] : s/p Right distal radius intraarticular 3 part fracture open reduction and internal fixation.\par  Brachioradialis tendon lengthening DOS: 10/28/22. \par

## 2023-07-06 NOTE — HISTORY OF PRESENT ILLNESS
[FreeTextEntry1] : Peyton returns for follow-up today with a complaint of right shoulder pain that has been ongoing since October.  She is currently status post right shoulder injection which had helped tremendously as well as proper physical therapy however symptoms have returned as she recently reinjured her right shoulder once again she states.

## 2023-07-06 NOTE — ASSESSMENT
[FreeTextEntry1] : ASSESSMENT:\par \par The patient comes in today with symptoms of right shoulder pain that have been present since a injury back in October 2022 since then the symptoms have improved status post prior injection as well as physical therapy.\par At this point in time she elects for repeat injection as well as commencing physical therapy once again as her symptoms have flared up.\par \par [I have diagnosed the patient today with a new diagnosis - This may diminish bodily function for the extremity.] \par \par [For this I was able to review other physician’s note(s) including reviewing other tests, imaging results as well as personally view these results for my own interpretation.]\par \par Right SUBACROMIAL SHOULDER INJECTION\par \par Indication:  subacromial bursitis/impingement, pain\par \par Risk, benefits and alternatives were discussed with the patient. Potential complications include bleeding and infection. \par Alcohol was used to prep the area.  Ethyl chloride spray was used as a topical anesthetic.  Using sterile technique, the injection needle was then directed from a standard posterior approach parallel to and inferior to the acromion.\par A 21g needle was used to inject 5 mL of 1% Lidocaine and 2 mL Kenalog.  No significant resistance was encountered.   \par A bandage was applied.  The patient tolerated the procedure well.   \par \par Patient instructed to avoid strenuous activity for 2 day(s). \par Specifically counseled regarding the signs and symptoms of potential infection and instructed to present promptly to clinic or hospital if such signs and symptoms arise.\par \par The patient was adequately and thoroughly informed of my assessment of their current condition(s). \par \par DISCUSSION:\par 1.  I am hopeful that the injection to the right shoulder will help improve her symptomatology\par 2.  She will commence physical therapy and we will see each other again in 10 weeks\par \par

## 2023-07-10 ENCOUNTER — NON-APPOINTMENT (OUTPATIENT)
Age: 78
End: 2023-07-10

## 2023-07-13 ENCOUNTER — RX RENEWAL (OUTPATIENT)
Age: 78
End: 2023-07-13

## 2023-08-16 NOTE — ED CDU PROVIDER INITIAL DAY NOTE - CPE EDP ENMT NORM
Patient called back. Informed him that he needs a telephone visit to refill his medication.     Can someone call patient back to schedule telephone appointment?    Brandi Love RN  [facility]    normal...

## 2023-08-21 ENCOUNTER — NON-APPOINTMENT (OUTPATIENT)
Age: 78
End: 2023-08-21

## 2023-09-01 RX ORDER — SERTRALINE HYDROCHLORIDE 50 MG/1
50 TABLET, FILM COATED ORAL
Qty: 90 | Refills: 1 | Status: ACTIVE | COMMUNITY
Start: 2022-04-20 | End: 1900-01-01

## 2023-09-10 ENCOUNTER — NON-APPOINTMENT (OUTPATIENT)
Age: 78
End: 2023-09-10

## 2023-09-12 ENCOUNTER — APPOINTMENT (OUTPATIENT)
Dept: INTERNAL MEDICINE | Facility: CLINIC | Age: 78
End: 2023-09-12
Payer: MEDICARE

## 2023-09-12 VITALS
SYSTOLIC BLOOD PRESSURE: 110 MMHG | HEIGHT: 60 IN | RESPIRATION RATE: 14 BRPM | DIASTOLIC BLOOD PRESSURE: 70 MMHG | WEIGHT: 165 LBS | HEART RATE: 83 BPM | OXYGEN SATURATION: 97 % | BODY MASS INDEX: 32.39 KG/M2
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
## 2023-09-12 DIAGNOSIS — S22.32XA FRACTURE OF ONE RIB, LEFT SIDE, INITIAL ENCOUNTER FOR CLOSED FRACTURE: ICD-10-CM

## 2023-09-12 PROCEDURE — 99213 OFFICE O/P EST LOW 20 MIN: CPT

## 2023-09-14 ENCOUNTER — APPOINTMENT (OUTPATIENT)
Dept: ORTHOPEDIC SURGERY | Facility: CLINIC | Age: 78
End: 2023-09-14

## 2023-10-11 ENCOUNTER — APPOINTMENT (OUTPATIENT)
Dept: DERMATOLOGY | Facility: CLINIC | Age: 78
End: 2023-10-11
Payer: MEDICARE

## 2023-10-11 PROCEDURE — 99212 OFFICE O/P EST SF 10 MIN: CPT

## 2023-10-17 ENCOUNTER — APPOINTMENT (OUTPATIENT)
Dept: INTERNAL MEDICINE | Facility: CLINIC | Age: 78
End: 2023-10-17

## 2023-10-17 DIAGNOSIS — R73.01 IMPAIRED FASTING GLUCOSE: ICD-10-CM

## 2023-10-18 ENCOUNTER — RX RENEWAL (OUTPATIENT)
Age: 78
End: 2023-10-18

## 2024-01-02 ENCOUNTER — RX RENEWAL (OUTPATIENT)
Age: 79
End: 2024-01-02

## 2024-01-02 RX ORDER — ROSUVASTATIN CALCIUM 40 MG/1
40 TABLET, FILM COATED ORAL
Qty: 90 | Refills: 1 | Status: ACTIVE | COMMUNITY
Start: 2018-04-20

## 2024-01-03 ENCOUNTER — EMERGENCY (EMERGENCY)
Facility: HOSPITAL | Age: 79
LOS: 1 days | Discharge: HOME | End: 2024-01-03
Attending: EMERGENCY MEDICINE
Payer: MEDICARE

## 2024-01-03 ENCOUNTER — APPOINTMENT (OUTPATIENT)
Dept: INTERNAL MEDICINE | Facility: CLINIC | Age: 79
End: 2024-01-03
Payer: MEDICARE

## 2024-01-03 ENCOUNTER — NON-APPOINTMENT (OUTPATIENT)
Age: 79
End: 2024-01-03

## 2024-01-03 VITALS
OXYGEN SATURATION: 96 % | TEMPERATURE: 98 F | HEART RATE: 73 BPM | RESPIRATION RATE: 18 BRPM | WEIGHT: 176.59 LBS | DIASTOLIC BLOOD PRESSURE: 86 MMHG | SYSTOLIC BLOOD PRESSURE: 180 MMHG

## 2024-01-03 DIAGNOSIS — R51.9 HEADACHE, UNSPECIFIED: ICD-10-CM

## 2024-01-03 DIAGNOSIS — Z98.89 OTHER SPECIFIED POSTPROCEDURAL STATES: Chronic | ICD-10-CM

## 2024-01-03 DIAGNOSIS — R05.9 COUGH, UNSPECIFIED: ICD-10-CM

## 2024-01-03 DIAGNOSIS — R68.83 CHILLS (WITHOUT FEVER): ICD-10-CM

## 2024-01-03 DIAGNOSIS — H26.40 UNSPECIFIED SECONDARY CATARACT: Chronic | ICD-10-CM

## 2024-01-03 DIAGNOSIS — U07.1 COVID-19: ICD-10-CM

## 2024-01-03 PROCEDURE — 70450 CT HEAD/BRAIN W/O DYE: CPT | Mod: MA

## 2024-01-03 PROCEDURE — 99284 EMERGENCY DEPT VISIT MOD MDM: CPT

## 2024-01-03 PROCEDURE — 99441: CPT | Mod: 93

## 2024-01-03 PROCEDURE — 99284 EMERGENCY DEPT VISIT MOD MDM: CPT | Mod: 25

## 2024-01-03 PROCEDURE — 82962 GLUCOSE BLOOD TEST: CPT

## 2024-01-03 PROCEDURE — 70450 CT HEAD/BRAIN W/O DYE: CPT | Mod: 26,MA

## 2024-01-03 RX ORDER — ONDANSETRON 8 MG/1
4 TABLET, FILM COATED ORAL ONCE
Refills: 0 | Status: DISCONTINUED | OUTPATIENT
Start: 2024-01-03 | End: 2024-01-10

## 2024-01-03 RX ORDER — ACETAMINOPHEN 500 MG
1000 TABLET ORAL ONCE
Refills: 0 | Status: DISCONTINUED | OUTPATIENT
Start: 2024-01-03 | End: 2024-01-10

## 2024-01-03 RX ORDER — SODIUM CHLORIDE 9 MG/ML
1000 INJECTION INTRAMUSCULAR; INTRAVENOUS; SUBCUTANEOUS ONCE
Refills: 0 | Status: DISCONTINUED | OUTPATIENT
Start: 2024-01-03 | End: 2024-01-10

## 2024-01-03 NOTE — ED PROVIDER NOTE - PATIENT PORTAL LINK FT
You can access the FollowMyHealth Patient Portal offered by [facility] by registering at the following website: [email or web address]. By joining [employer]’s FollowMyHealth portal, you will also be able to view your health information using other applications (apps) compatible with our system. You can access the FollowMyHealth Patient Portal offered by [facility] by registering at the following website: [email or web address]. By joining [employer]’s FollowMyHealth portal, you will also be able to view your health information using other applications (apps) compatible with our system.

## 2024-01-03 NOTE — ED PROVIDER NOTE - CARE PROVIDER_API CALL
Kevin Ashraf  Neurology  [address], [address]  [place] [address]  Phone: [phone]  Fax: [phone]  Follow Up Time: Urgent   Kevin Ashraf  Neurology  [address], [address]  [place] [address]  Phone: [phone]  Fax: [phone]  Follow Up Time: Urgent

## 2024-01-03 NOTE — ED PROVIDER NOTE - OBJECTIVE STATEMENT
Patient is a 77 yo female with PMHx a fib, ADARSH, recently diagnosed with covid-19 presenting with HA since 3 PM. Code stroke cancelled. Patient vss, NIHSS 0, no FND, GCS 15. Denies falls, LOC, blood thinners. States she was recently diagnosed with covid-19 and since 3 PM has had a dull frontal HA with mild relief from excedrin. Denies vomiting. States she has photophobia and mild dizziness. Denies any other PMHx, allergies, surgeries. Patient is a 79 yo female with PMHx a fib, ADARSH, recently diagnosed with covid-19 presenting with HA since 3 PM. Code stroke cancelled. Patient vss, NIHSS 0, no FND, GCS 15. Denies falls, LOC, blood thinners. States she was recently diagnosed with covid-19 and since 3 PM has had a dull frontal HA with mild relief from excedrin. Denies vomiting. States she has photophobia and mild dizziness. Denies any other PMHx, allergies, surgeries.

## 2024-01-03 NOTE — ED PROVIDER NOTE - CLINICAL SUMMARY MEDICAL DECISION MAKING FREE TEXT BOX
Patient is a 79 yo female with PMHx a fib, ADARSH, recently diagnosed with covid-19 presenting with HA since 3 PM. Code stroke cancelled. Patient vss, NIHSS 0, no FND, GCS 15. Denies falls, LOC, blood thinners. vss      Will treat likely complex migraine, check labs r.o electrolyte abnormalities, CTH to r.o bleed, reassess. Patient is a 77 yo female with PMHx a fib, ADARSH, recently diagnosed with covid-19 presenting with HA since 3 PM. Code stroke cancelled. Patient vss, NIHSS 0, no FND, GCS 15. Denies falls, LOC, blood thinners. vss      Will treat likely complex migraine, check labs r.o electrolyte abnormalities, CTH to r.o bleed, reassess.

## 2024-01-03 NOTE — ED PROVIDER NOTE - PROGRESS NOTE DETAILS
[place] - CT WNL. Patient asymptomatic, vss, tolerating PO, ambulating on her own. Refusing IV, labs. Patient with full capacity. Shared decision making conversation held, patient understands risks of leaving without further work up however would like to go home and follow with neurology. Symptoms resolved. Tolerating PO. Ambulating on her own. Ready for DC with return precautions.

## 2024-01-03 NOTE — ED ADULT TRIAGE NOTE - CHIEF COMPLAINT QUOTE
patient A&Ox4 states today at 3pm she began having the worst headache of her life seen at [facility] and prompted to come to ED for further eval. denies changes in vision, ambulating with steady gait in triage. took excedrin with no relief. patient A&Ox4 states today at 3pm she began having the worst headache of her life seen at [facility] and prompted to come to ED for further eval. denies changes in vision, ambulating with steady gait in triage. took excedrin with no relief.

## 2024-01-03 NOTE — HISTORY OF PRESENT ILLNESS
[Home] : at home, [unfilled] , at the time of the visit. [Medical Office: ([facility])___] : at the medical office located in  [Verbal consent obtained from patient] : the patient, [unfilled] [Time Spent: ___ minutes] : I have spent [unfilled] minutes with the patient on the telephone [FreeTextEntry1] : The patient presents for an acute telephonic visit secondary to testing positive for COVID yesterday. The patient states yesterday she started not feeling well with fatigue, chills and feeling feverish.  As the day progressed she worsened with symptoms including cough, body aches, malaise and headache. The patient's symptoms worsened including severe headache therefore she went to urgent care where she was tested  positive for COVID.  Secondary to her severe headache she was sent to the ER where she went and was evaluated with a CAT scan with no acute findings. Patient discharged to home but no treatment was given.  Patient continues with her symptoms although headache not as bad and improved with Tylenol. Discussed treatment with Paxlovid which the patient wants therefore prescribed with medications reviewed and patient told not to take Zetia or rosuvastatin for 8 days.  She does not take Ambien. Also recommended symptomatic treatment including plenty of rest and fluids. Told to call if symptoms persist or worsen.

## 2024-01-03 NOTE — ED PROVIDER NOTE - PHYSICAL EXAMINATION
Constitutional: Well appearing, awake, alert, oriented to person, place, and time/situation and in no apparent distress  ENMT: Airway patent nasal mucosa clear. Mouth with normal mucosa. Throat has no vesicles no oropharyngeal exudates and uvula is midline. No blood in the oropharynx  EYES: clear bilaterally, pupils equal, round and reactive to light  Cardiac: Regular rate, regular rhythm. Heart sounds S1, S2. No murmurs, rubs or gallops. Good capillary refill, 2+ pulses, no peripheral edema  Respiratory: Lungs CTAB, no use of accessory muscles, no crackles, satting 99% on RA in no distress  Gastrointestinal: Abdomen nondistended, non-tender, no rebound guarding or peritoneal signs  Genitourinary: No CVA tenderness, pelvis nontender, bladder nondistended  Musculoskeletal: Spine appears normal, range of motion is not limited, no muscle or joint tenderness  Neurological: Alert and oriented, no focal deficits, no motor or sensory deficits. CN 2-12 intact, PERRLA, EOMI, No FND, moving all extremities equally, sensation intact, No dysmetria, no ataxia, negative heel-shin, 5/5 strength

## 2024-01-03 NOTE — ED PROVIDER NOTE - CARE PROVIDERS DIRECT ADDRESSES
,[email or web address]

## 2024-01-03 NOTE — ED PROVIDER NOTE - NSFOLLOWUPINSTRUCTIONS_ED_ALL_ED_FT
Tension Headache, Adult  A tension headache is a feeling of pain, pressure, or aching over the front and sides of the head. The pain can be dull, or it can feel tight. There are two types of tension headache:  Episodic tension headache. This is when the headaches happen fewer than 15 days a month.  Chronic tension headache. This is when the headaches happen more than 15 days a month during a 3-month period.  A tension headache can last from 30 minutes to several days. It is the most common kind of headache. Tension headaches are not normally associated with nausea or vomiting, and they do not get worse with physical activity.    What are the causes?  The exact cause of this condition is not known. Tension headaches are often triggered by stress, anxiety, or depression. Other triggers may include:  Alcohol.  Too much caffeine or caffeine withdrawal.  Respiratory infections, such as colds, flu, or sinus infections.  Dental problems or teeth clenching.  Fatigue.  Holding your head and neck in the same position for a long period of time, such as while using a computer.  Smoking.  Arthritis of the neck.  What are the signs or symptoms?  Symptoms of this condition include:  A feeling of pressure or tightness around the head.  Dull, aching head pain.  Pain over the front and sides of the head.  Tenderness in the muscles of the head, neck, and shoulders.  How is this diagnosed?  This condition may be diagnosed based on your symptoms, your medical history, and a physical exam.    If your symptoms are severe or unusual, you may have imaging tests, such as a CT scan or an MRI of your head. Your vision may also be checked.    How is this treated?  This condition may be treated with lifestyle changes and with medicines that help relieve symptoms.    Follow these instructions at home:  Managing pain    Take over-the-counter and prescription medicines only as told by your health care provider.  When you have a headache, lie down in a dark, quiet room.  If directed, put ice on your head and neck. To do this:  Put ice in a plastic bag.  Place a towel between your skin and the bag.  Leave the ice on for 20 minutes, 2–3 times a day.  Remove the ice if your skin turns bright red. This is very important. If you cannot feel pain, heat, or cold, you have a greater risk of damage to the area.  If directed, apply heat to the back of your neck as often as told by your health care provider. Use the heat source that your health care provider recommends, such as a moist heat pack or a heating pad.  Place a towel between your skin and the heat source.  Leave the heat on for 20–30 minutes.  Remove the heat if your skin turns bright red. This is especially important if you are unable to feel pain, heat, or cold. You have a greater risk of getting burned.  Eating and drinking    Eat meals on a regular schedule.  If you drink alcohol:  Limit how much you have to:  0–1 drink a day for women who are not pregnant.  0–2 drinks a day for men.  Know how much alcohol is in your drink. In the [place] one drink equals one 12 oz bottle of beer (355 mL), one 5 oz glass of wine (148 mL), or one 1½ oz glass of hard liquor (44 mL).  Drink enough fluid to keep your urine pale yellow.  Decrease your caffeine intake, or stop using caffeine.  Lifestyle    Get 7–9 hours of sleep each night, or get the amount of sleep recommended by your health care provider.  At bedtime, remove computers, phones, and tablets from your room.  Find ways to manage your stress. This may include:  Exercise.  Deep breathing exercises.  Yoga.  Listening to music.  Positive mental imagery.  Try to sit up straight and avoid tensing your muscles.  Do not use any products that contain nicotine or tobacco. These include cigarettes, chewing tobacco, and vaping devices, such as e-cigarettes. If you need help quitting, ask your health care provider.  General instructions      Avoid any headache triggers. Keep a journal to help find out what may trigger your headaches. For example, write down:  What you eat and drink.  How much sleep you get.  Any change to your diet or medicines.  Keep all follow-up visits. This is important.  Contact a health care provider if:  Your headache does not get better.  Your headache comes back.  You are sensitive to sounds, light, or smells because of a headache.  You have nausea or you vomit.  Your stomach hurts.  Get help right away if:  You suddenly develop a severe headache, along with any of the following:  A stiff neck.  Nausea and vomiting.  Confusion.  Weakness in one part or one side of your body.  Double vision or loss of vision.  Shortness of breath.  Rash.  Unusual sleepiness.  Fever or chills.  Trouble speaking.  Pain in your eye or ear.  Trouble walking or balancing.  Feeling faint or passing out.  Summary  A tension headache is a feeling of pain, pressure, or aching over the front and sides of the head.  A tension headache can last from 30 minutes to several days. It is the most common kind of headache.  This condition may be diagnosed based on your symptoms, your medical history, and a physical exam.  This condition may be treated with lifestyle changes and with medicines that help relieve symptoms.  This information is not intended to replace advice given to you by your health care provider. Make sure you discuss any questions you have with your health care provider.    Document Revised: 09/16/2021 Document Reviewed: 09/16/2021

## 2024-01-23 ENCOUNTER — APPOINTMENT (OUTPATIENT)
Dept: NEUROLOGY | Facility: CLINIC | Age: 79
End: 2024-01-23
Payer: MEDICARE

## 2024-01-23 VITALS
HEART RATE: 73 BPM | HEIGHT: 60 IN | WEIGHT: 170 LBS | SYSTOLIC BLOOD PRESSURE: 115 MMHG | OXYGEN SATURATION: 94 % | DIASTOLIC BLOOD PRESSURE: 76 MMHG | BODY MASS INDEX: 33.38 KG/M2

## 2024-01-23 DIAGNOSIS — R51.9 HEADACHE, UNSPECIFIED: ICD-10-CM

## 2024-01-23 PROCEDURE — 99204 OFFICE O/P NEW MOD 45 MIN: CPT

## 2024-01-23 NOTE — ASSESSMENT
[FreeTextEntry1] : 78-year-old female presents for follow-up from recent ER visit January 3 for headache diagnosed with COVID infection status post Paxlovid with resolution in her headache, still lingering fatigue headache likely secondary to infection with a new onset we will complete MRI imaging assess for any structural changes. She will continue to monitor symptoms.  Further evaluation based on above findings Water intake encouraged

## 2024-01-23 NOTE — PHYSICAL EXAM
[General Appearance - In No Acute Distress] : in no acute distress [Affect] : the affect was normal [Person] : oriented to person [Place] : oriented to place [Time] : oriented to time [Short Term Intact] : short term memory intact [Remote Intact] : remote memory intact [Registration Intact] : recent registration memory intact [Current Events] : adequate knowledge of current events [Cranial Nerves Optic (II)] : visual acuity intact bilaterally,  visual fields full to confrontation, pupils equal round and reactive to light [Cranial Nerves Oculomotor (III)] : extraocular motion intact [Cranial Nerves Trigeminal (V)] : facial sensation intact symmetrically [Cranial Nerves Facial (VII)] : face symmetrical [Cranial Nerves Vestibulocochlear (VIII)] : hearing was intact bilaterally [Cranial Nerves Glossopharyngeal (IX)] : tongue and palate midline [Cranial Nerves Accessory (XI - Cranial And Spinal)] : head turning and shoulder shrug symmetric [Cranial Nerves Hypoglossal (XII)] : there was no tongue deviation with protrusion [Motor Strength] : muscle strength was normal in all four extremities [Motor Handedness Right-Handed] : the patient is right hand dominant [PERRL With Normal Accommodation] : pupils were equal in size, round, reactive to light, with normal accommodation [Extraocular Movements] : extraocular movements were intact [Full Visual Field] : full visual field [] : no respiratory distress [No Spinal Tenderness] : no spinal tenderness [Involuntary Movements] : no involuntary movements were seen [FreeTextEntry8] : Gait is antalgic steady [FreeTextEntry9] : DTR +1 symmetrically

## 2024-01-23 NOTE — HISTORY OF PRESENT ILLNESS
[FreeTextEntry1] : 78-year-old female presents for follow-up from recent ER visit January 3 which she presented for new onset headache with some lightheadedness was seen at urgent care and tested positive for COVID.  She then presented to [facility] had head CT no acute findings.  Was discharged and follow-up with her primary care who started her on Paxlovid which she reports improvement in her symptoms shortly after starting. Today she she reports no headaches other than lingering fatigue. Denied any change in her speech or vision or any weakness, confusion or any falls. Sleeps 3 hours at night minimal water intake    HCT IMPRESSION: No evidence of acute intracranial hemorrhage, midline shift or CT evidence of acute territorial infarct. If the patient's symptoms persist, consider short interval follow-up head CT or brain MRI if there are no MRI contraindication

## 2024-01-30 ENCOUNTER — NON-APPOINTMENT (OUTPATIENT)
Age: 79
End: 2024-01-30

## 2024-02-06 DIAGNOSIS — R93.89 ABNORMAL FINDINGS ON DIAGNOSTIC IMAGING OF OTHER SPECIFIED BODY STRUCTURES: ICD-10-CM

## 2024-02-15 ENCOUNTER — APPOINTMENT (OUTPATIENT)
Dept: MRI IMAGING | Facility: CLINIC | Age: 79
End: 2024-02-15
Payer: MEDICARE

## 2024-02-15 ENCOUNTER — OUTPATIENT (OUTPATIENT)
Dept: OUTPATIENT SERVICES | Facility: HOSPITAL | Age: 79
LOS: 1 days | End: 2024-02-15
Payer: MEDICARE

## 2024-02-15 DIAGNOSIS — Z98.89 OTHER SPECIFIED POSTPROCEDURAL STATES: Chronic | ICD-10-CM

## 2024-02-15 DIAGNOSIS — M47.812 SPONDYLOSIS WITHOUT MYELOPATHY OR RADICULOPATHY, CERVICAL REGION: ICD-10-CM

## 2024-02-15 DIAGNOSIS — H26.40 UNSPECIFIED SECONDARY CATARACT: Chronic | ICD-10-CM

## 2024-02-15 PROCEDURE — 72156 MRI NECK SPINE W/O & W/DYE: CPT | Mod: 26,MH

## 2024-02-15 PROCEDURE — 72157 MRI CHEST SPINE W/O & W/DYE: CPT

## 2024-02-15 PROCEDURE — 72157 MRI CHEST SPINE W/O & W/DYE: CPT | Mod: 26,MH

## 2024-02-15 PROCEDURE — 70552 MRI BRAIN STEM W/DYE: CPT

## 2024-02-15 PROCEDURE — 72156 MRI NECK SPINE W/O & W/DYE: CPT

## 2024-02-15 PROCEDURE — 70552 MRI BRAIN STEM W/DYE: CPT | Mod: 26,MH

## 2024-02-15 PROCEDURE — A9585: CPT

## 2024-02-22 ENCOUNTER — NON-APPOINTMENT (OUTPATIENT)
Age: 79
End: 2024-02-22

## 2024-03-12 ENCOUNTER — APPOINTMENT (OUTPATIENT)
Dept: NEUROLOGY | Facility: CLINIC | Age: 79
End: 2024-03-12
Payer: MEDICARE

## 2024-03-12 VITALS
HEIGHT: 62 IN | WEIGHT: 170 LBS | BODY MASS INDEX: 31.28 KG/M2 | HEART RATE: 78 BPM | SYSTOLIC BLOOD PRESSURE: 124 MMHG | DIASTOLIC BLOOD PRESSURE: 74 MMHG | OXYGEN SATURATION: 98 %

## 2024-03-12 DIAGNOSIS — G47.9 SLEEP DISORDER, UNSPECIFIED: ICD-10-CM

## 2024-03-12 DIAGNOSIS — R90.82 WHITE MATTER DISEASE, UNSPECIFIED: ICD-10-CM

## 2024-03-12 PROCEDURE — G2211 COMPLEX E/M VISIT ADD ON: CPT

## 2024-03-12 PROCEDURE — 99213 OFFICE O/P EST LOW 20 MIN: CPT

## 2024-03-19 PROBLEM — R90.82 WHITE MATTER ABNORMALITY ON MRI OF BRAIN: Status: ACTIVE | Noted: 2024-02-06

## 2024-03-19 NOTE — ASSESSMENT
[FreeTextEntry1] : 78-year-old female presents for follow-up to review testing MRI findings appear more consistent with microvascular changes and less likely demyelinating.  Will discuss and review images with Dr. Romeo MS specialist She is to follow-up with pulmonology/sleep Specialist  to address her sleep habits and possible sleep apnea CD less than 50% stenosis

## 2024-03-19 NOTE — HISTORY OF PRESENT ILLNESS
[FreeTextEntry1] : **Interval 3.12.24:  Patient presents today to review testing MRIs. Brain MRI reported extensive nonenhancing T2 prolongation in the periventricular white matter region findings more compatible with chronic microvascular ischemic changes.  Cervical spine and thoracic spine MRI with degenerative changes will see acupuncturist for spine findings. not interested in seeing Dr. Romeo   restless with sleep, snores. told  to snore. ?needed CPAP, did not try.     Previous Hx 78-year-old female presents for follow-up from recent ER visit January 3 which she presented for new onset headache with some lightheadedness was seen at urgent care and tested positive for COVID.  She then presented to [facility] had head CT no acute findings.  Was discharged and follow-up with her primary care who started her on Paxlovid which she reports improvement in her symptoms shortly after starting. Today she she reports no headaches other than lingering fatigue. Denied any change in her speech or vision or any weakness, confusion or any falls. Sleeps 3 hours at night minimal water intake   HCT IMPRESSION: No evidence of acute intracranial hemorrhage, midline shift or CT evidence of acute territorial infarct. If the patient's symptoms persist, consider short interval follow-up head CT or brain MRI if there are no MRI contraindication

## 2024-03-25 ENCOUNTER — APPOINTMENT (OUTPATIENT)
Dept: NEUROLOGY | Facility: CLINIC | Age: 79
End: 2024-03-25
Payer: MEDICARE

## 2024-03-25 PROCEDURE — 95806 SLEEP STUDY UNATT&RESP EFFT: CPT

## 2024-04-30 ENCOUNTER — NON-APPOINTMENT (OUTPATIENT)
Age: 79
End: 2024-04-30

## 2024-05-02 ENCOUNTER — NON-APPOINTMENT (OUTPATIENT)
Age: 79
End: 2024-05-02

## 2024-05-03 ENCOUNTER — NON-APPOINTMENT (OUTPATIENT)
Age: 79
End: 2024-05-03

## 2024-05-09 ENCOUNTER — APPOINTMENT (OUTPATIENT)
Dept: DERMATOLOGY | Facility: CLINIC | Age: 79
End: 2024-05-09

## 2024-05-15 ENCOUNTER — OFFICE (OUTPATIENT)
Dept: URBAN - METROPOLITAN AREA CLINIC 115 | Facility: CLINIC | Age: 79
Setting detail: OPHTHALMOLOGY
End: 2024-05-15
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
## 2024-05-15 PROCEDURE — NO SHOW FE NO SHOW FEE: Performed by: OPTOMETRIST

## 2024-05-16 ENCOUNTER — APPOINTMENT (OUTPATIENT)
Dept: INTERNAL MEDICINE | Facility: CLINIC | Age: 79
End: 2024-05-16
Payer: MEDICARE

## 2024-05-16 VITALS
SYSTOLIC BLOOD PRESSURE: 120 MMHG | RESPIRATION RATE: 12 BRPM | BODY MASS INDEX: 31.65 KG/M2 | WEIGHT: 172 LBS | HEART RATE: 82 BPM | DIASTOLIC BLOOD PRESSURE: 80 MMHG | OXYGEN SATURATION: 98 % | HEIGHT: 62 IN

## 2024-05-16 DIAGNOSIS — F41.9 ANXIETY DISORDER, UNSPECIFIED: ICD-10-CM

## 2024-05-16 DIAGNOSIS — E78.00 PURE HYPERCHOLESTEROLEMIA, UNSPECIFIED: ICD-10-CM

## 2024-05-16 DIAGNOSIS — R73.09 OTHER ABNORMAL GLUCOSE: ICD-10-CM

## 2024-05-16 DIAGNOSIS — K76.0 FATTY (CHANGE OF) LIVER, NOT ELSEWHERE CLASSIFIED: ICD-10-CM

## 2024-05-16 DIAGNOSIS — Z79.899 OTHER LONG TERM (CURRENT) DRUG THERAPY: ICD-10-CM

## 2024-05-16 DIAGNOSIS — E66.9 OBESITY, UNSPECIFIED: ICD-10-CM

## 2024-05-16 DIAGNOSIS — F32.A ANXIETY DISORDER, UNSPECIFIED: ICD-10-CM

## 2024-05-16 DIAGNOSIS — Z87.440 PERSONAL HISTORY OF URINARY (TRACT) INFECTIONS: ICD-10-CM

## 2024-05-16 PROCEDURE — 99214 OFFICE O/P EST MOD 30 MIN: CPT

## 2024-05-16 PROCEDURE — 36415 COLL VENOUS BLD VENIPUNCTURE: CPT

## 2024-05-16 PROCEDURE — G2211 COMPLEX E/M VISIT ADD ON: CPT

## 2024-05-16 RX ORDER — NIRMATRELVIR AND RITONAVIR 300-100 MG
20 X 150 MG & KIT ORAL
Qty: 1 | Refills: 0 | Status: DISCONTINUED | COMMUNITY
Start: 2024-01-03 | End: 2024-05-16

## 2024-05-17 LAB
ALBUMIN SERPL ELPH-MCNC: 4.4 G/DL
ALP BLD-CCNC: 85 U/L
ALT SERPL-CCNC: 26 U/L
ANION GAP SERPL CALC-SCNC: 14 MMOL/L
AST SERPL-CCNC: 27 U/L
BASOPHILS # BLD AUTO: 0.08 K/UL
BASOPHILS NFR BLD AUTO: 1.1 %
BILIRUB SERPL-MCNC: 0.8 MG/DL
BUN SERPL-MCNC: 16 MG/DL
CALCIUM SERPL-MCNC: 9.7 MG/DL
CHLORIDE SERPL-SCNC: 102 MMOL/L
CHOLEST SERPL-MCNC: 213 MG/DL
CO2 SERPL-SCNC: 22 MMOL/L
CREAT SERPL-MCNC: 0.75 MG/DL
EGFR: 82 ML/MIN/1.73M2
EOSINOPHIL # BLD AUTO: 0.15 K/UL
EOSINOPHIL NFR BLD AUTO: 2.1 %
ESTIMATED AVERAGE GLUCOSE: 111 MG/DL
GLUCOSE SERPL-MCNC: 133 MG/DL
HBA1C MFR BLD HPLC: 5.5 %
HCT VFR BLD CALC: 42.1 %
HDLC SERPL-MCNC: 71 MG/DL
HGB BLD-MCNC: 13.3 G/DL
IMM GRANULOCYTES NFR BLD AUTO: 0.1 %
LDLC SERPL CALC-MCNC: 124 MG/DL
LYMPHOCYTES # BLD AUTO: 1.98 K/UL
LYMPHOCYTES NFR BLD AUTO: 28.2 %
MAN DIFF?: NORMAL
MCHC RBC-ENTMCNC: 31.6 GM/DL
MCHC RBC-ENTMCNC: 32.3 PG
MCV RBC AUTO: 102.2 FL
MONOCYTES # BLD AUTO: 0.7 K/UL
MONOCYTES NFR BLD AUTO: 10 %
NEUTROPHILS # BLD AUTO: 4.1 K/UL
NEUTROPHILS NFR BLD AUTO: 58.5 %
NONHDLC SERPL-MCNC: 142 MG/DL
PLATELET # BLD AUTO: 294 K/UL
POTASSIUM SERPL-SCNC: 4.5 MMOL/L
PROT SERPL-MCNC: 6.8 G/DL
RBC # BLD: 4.12 M/UL
RBC # FLD: 14.4 %
SODIUM SERPL-SCNC: 139 MMOL/L
TRIGL SERPL-MCNC: 103 MG/DL
TSH SERPL-ACNC: 2.24 UIU/ML
WBC # FLD AUTO: 7.02 K/UL

## 2024-05-17 RX ORDER — EZETIMIBE 10 MG/1
10 TABLET ORAL
Qty: 90 | Refills: 1 | Status: ACTIVE | COMMUNITY
Start: 2021-12-09

## 2024-05-20 LAB
APPEARANCE: ABNORMAL
BACTERIA UR CULT: NORMAL
BACTERIA: ABNORMAL /HPF
BILIRUBIN URINE: NEGATIVE
BLOOD URINE: NEGATIVE
CAST: 0 /LPF
COLOR: YELLOW
EPITHELIAL CELLS: 9 /HPF
GLUCOSE QUALITATIVE U: NEGATIVE MG/DL
KETONES URINE: NEGATIVE MG/DL
LEUKOCYTE ESTERASE URINE: ABNORMAL
MICROSCOPIC-UA: NORMAL
NITRITE URINE: NEGATIVE
PH URINE: 7
PROTEIN URINE: NEGATIVE MG/DL
RED BLOOD CELLS URINE: 0 /HPF
REVIEW: NORMAL
SPECIFIC GRAVITY URINE: 1.02
UROBILINOGEN URINE: 0.2 MG/DL
WHITE BLOOD CELLS URINE: 2 /HPF

## 2024-05-20 NOTE — ASSESSMENT
[FreeTextEntry1] : Venipuncture done in our office, Labs sent out and further recommendations will be made based on lab results. Patient advised to continue present medications with diet/exercise and specialists followup.RTO in 3-4months for CP   vaccines are UTD? +got covid vaccines  Bone density-7/2021 "to do"=rx given Mammogram-"to do"=rx given Colonoscopy 2/2018 Endoscopy 1/18 Specialists include 1. GI-Dr. Lang 2. Cardiology-Dr. Brooke 3. GYN-Dr. Zuñiga=has not been in years 4. Ophthalmology-Sight MD 5. Orthopedic p.r.n.-Dr. Read/Dr. Thompson/Dr. Rogers/Dr. Franklin 6. Neurology-Silver 7. Vascular-Abraham Granados 8. Hepatologist- =declines followup 9.Podiatry=Dr. Hilliard 10.Derm=Dr. Pitt Carotid sonogram via vascular Hepatitis C screening in the past was negative CT lung=N/A echo via cardio

## 2024-05-20 NOTE — HISTORY OF PRESENT ILLNESS
[de-identified] : Patient presents for followup on hyperlipidemia/obesity/PAF. Patient is currently fasting for today's labs. Patient is currently on crestor/zetia for hyperlipidemia, on aspirin for PAF and has not made any lifestyle changes as of yet to improve her obesity.  -got covid vaccines  -pt wants urine checked, treated 5/1/24 via clinic for UTI

## 2024-05-20 NOTE — ADDENDUM
[FreeTextEntry1] : Obtained bone density from 6/9/2023 therefore patient does not need to schedule  Urine culture shows contamination, told to repeat if symptomatic

## 2024-05-21 DIAGNOSIS — R82.90 UNSPECIFIED ABNORMAL FINDINGS IN URINE: ICD-10-CM

## 2024-05-23 LAB
APPEARANCE: CLEAR
BACTERIA UR CULT: NORMAL
BACTERIA: ABNORMAL /HPF
BILIRUBIN URINE: NEGATIVE
BLOOD URINE: NEGATIVE
CAST: 0 /LPF
COLOR: YELLOW
EPITHELIAL CELLS: 18 /HPF
GLUCOSE QUALITATIVE U: NEGATIVE MG/DL
KETONES URINE: NEGATIVE MG/DL
LEUKOCYTE ESTERASE URINE: ABNORMAL
MICROSCOPIC-UA: NORMAL
NITRITE URINE: NEGATIVE
PH URINE: 5.5
PROTEIN URINE: NEGATIVE MG/DL
RED BLOOD CELLS URINE: 1 /HPF
SPECIFIC GRAVITY URINE: 1.02
UROBILINOGEN URINE: 0.2 MG/DL
WHITE BLOOD CELLS URINE: 2 /HPF

## 2024-06-06 ENCOUNTER — OFFICE (OUTPATIENT)
Dept: URBAN - METROPOLITAN AREA CLINIC 115 | Facility: CLINIC | Age: 79
Setting detail: OPHTHALMOLOGY
End: 2024-06-06
Payer: MEDICARE

## 2024-06-06 DIAGNOSIS — H01.004: ICD-10-CM

## 2024-06-06 DIAGNOSIS — H04.123: ICD-10-CM

## 2024-06-06 DIAGNOSIS — H01.001: ICD-10-CM

## 2024-06-06 DIAGNOSIS — B88.0: ICD-10-CM

## 2024-06-06 DIAGNOSIS — H35.3131: ICD-10-CM

## 2024-06-06 DIAGNOSIS — Z96.1: ICD-10-CM

## 2024-06-06 PROCEDURE — 92134 CPTRZ OPH DX IMG PST SGM RTA: CPT | Performed by: OPHTHALMOLOGY

## 2024-06-06 PROCEDURE — 92014 COMPRE OPH EXAM EST PT 1/>: CPT | Performed by: OPHTHALMOLOGY

## 2024-06-06 ASSESSMENT — CONFRONTATIONAL VISUAL FIELD TEST (CVF)
OD_FINDINGS: FULL
OS_FINDINGS: FULL

## 2024-06-06 ASSESSMENT — LID EXAM ASSESSMENTS
OS_BLEPHARITIS: LUL 1+
OD_BLEPHARITIS: RUL 1+

## 2024-06-11 ENCOUNTER — NON-APPOINTMENT (OUTPATIENT)
Age: 79
End: 2024-06-11

## 2024-07-08 DIAGNOSIS — R42 DIZZINESS AND GIDDINESS: ICD-10-CM

## 2024-07-08 RX ORDER — MECLIZINE HYDROCHLORIDE 12.5 MG/1
12.5 TABLET ORAL 3 TIMES DAILY
Qty: 20 | Refills: 0 | Status: ACTIVE | COMMUNITY
Start: 2024-07-08 | End: 1900-01-01

## 2024-08-08 ENCOUNTER — NON-APPOINTMENT (OUTPATIENT)
Age: 79
End: 2024-08-08

## 2024-09-05 ENCOUNTER — APPOINTMENT (OUTPATIENT)
Dept: INTERNAL MEDICINE | Facility: CLINIC | Age: 79
End: 2024-09-05
Payer: MEDICARE

## 2024-09-05 ENCOUNTER — NON-APPOINTMENT (OUTPATIENT)
Age: 79
End: 2024-09-05

## 2024-09-05 VITALS
SYSTOLIC BLOOD PRESSURE: 120 MMHG | BODY MASS INDEX: 31.65 KG/M2 | HEIGHT: 62 IN | RESPIRATION RATE: 13 BRPM | OXYGEN SATURATION: 95 % | HEART RATE: 71 BPM | DIASTOLIC BLOOD PRESSURE: 80 MMHG | WEIGHT: 172 LBS

## 2024-09-05 DIAGNOSIS — M50.30 OTHER CERVICAL DISC DEGENERATION, UNSPECIFIED CERVICAL REGION: ICD-10-CM

## 2024-09-05 DIAGNOSIS — G89.29 CERVICALGIA: ICD-10-CM

## 2024-09-05 DIAGNOSIS — Z00.00 ENCOUNTER FOR GENERAL ADULT MEDICAL EXAMINATION W/OUT ABNORMAL FINDINGS: ICD-10-CM

## 2024-09-05 DIAGNOSIS — I34.0 NONRHEUMATIC MITRAL (VALVE) INSUFFICIENCY: ICD-10-CM

## 2024-09-05 DIAGNOSIS — M85.80 OTHER SPECIFIED DISORDERS OF BONE DENSITY AND STRUCTURE, UNSPECIFIED SITE: ICD-10-CM

## 2024-09-05 DIAGNOSIS — R73.01 IMPAIRED FASTING GLUCOSE: ICD-10-CM

## 2024-09-05 DIAGNOSIS — M62.838 OTHER MUSCLE SPASM: ICD-10-CM

## 2024-09-05 DIAGNOSIS — K70.30 ALCOHOLIC CIRRHOSIS OF LIVER W/OUT ASCITES: ICD-10-CM

## 2024-09-05 DIAGNOSIS — R35.0 FREQUENCY OF MICTURITION: ICD-10-CM

## 2024-09-05 DIAGNOSIS — E78.00 PURE HYPERCHOLESTEROLEMIA, UNSPECIFIED: ICD-10-CM

## 2024-09-05 DIAGNOSIS — M54.2 CERVICALGIA: ICD-10-CM

## 2024-09-05 DIAGNOSIS — M79.674 PAIN IN RIGHT TOE(S): ICD-10-CM

## 2024-09-05 DIAGNOSIS — R32 UNSPECIFIED URINARY INCONTINENCE: ICD-10-CM

## 2024-09-05 DIAGNOSIS — Z86.79 PERSONAL HISTORY OF OTHER DISEASES OF THE CIRCULATORY SYSTEM: ICD-10-CM

## 2024-09-05 DIAGNOSIS — I35.1 NONRHEUMATIC AORTIC (VALVE) INSUFFICIENCY: ICD-10-CM

## 2024-09-05 PROCEDURE — 36415 COLL VENOUS BLD VENIPUNCTURE: CPT

## 2024-09-05 PROCEDURE — 93000 ELECTROCARDIOGRAM COMPLETE: CPT

## 2024-09-05 PROCEDURE — G0439: CPT

## 2024-09-05 RX ORDER — CYCLOBENZAPRINE HYDROCHLORIDE 5 MG/1
5 TABLET, FILM COATED ORAL 3 TIMES DAILY
Qty: 20 | Refills: 0 | Status: ACTIVE | COMMUNITY
Start: 2024-09-05 | End: 1900-01-01

## 2024-09-05 NOTE — COUNSELING
[Potential consequences of obesity discussed] : Potential consequences of obesity discussed [Benefits of weight loss discussed] : Benefits of weight loss discussed [Encouraged to increase physical activity] : Encouraged to increase physical activity [Healthy eating counseling provided] : healthy eating [Low Fat Diet] : Low fat diet [Decrease Portions] : Decrease food portions [Walking] : Walking [None] : None [Needs reinforcement, provided] : Patient needs reinforcement on understanding lifestyle changes and  the steps needed to achieve self management goals and reinforcement was provided [de-identified] : The patient again was told she should totally abstain from alcohol

## 2024-09-05 NOTE — ASSESSMENT
[FreeTextEntry1] : 78-year-old female with good general health with continued need for lifestyle changes with diet exercise and weight loss. A. fib with status post ablation and clinically remains in sinus rhythm.  Follow-up with cardiology as scheduled  Patient status post acute hepatitis secondary to drug-induced liver injury likely secondary to Lipitor with return to normal liver functions.She was currently on rosuvastatin and Zetia without issues.  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  the patient has been told on multiple occasions that she should be totally abstinent from drinking alcohol but continues to drink about 4-5 days a week. Again stressed the need that she needs to avoid alcohol. Refuses to follow-up with hepatology as well as declines any further imaging studies.  GI issues secondary to lymphocytic colitis which is relatively stable  Significant right carotid stenosis which progressed and therefore patient had a right carotid endarterectomy July 27, 2021 with success. Follow-up with vascular surgery as scheduled.  Erythema with pain right first toe likely inflammatory but doubt gout but will check uric acid level with today's blood work.  Trial of anti-inflammatory.  Cervical degenerative disc disease with increasing pain with spasm therefore referral given for physical therapy and muscle relaxant prescribed.  Urinary issues therefore referred to urogynecology.  Patient with chronic anxiety and depression on Zoloft 25 mg daily but continued issues therefore increased to 50 mg daily  Bone density June 2024 Mammography June 2024 Colonoscopy March 2020 Endoscopy January 2018 GYN yearly  Vaccines up to date (including hepatitis A and hepatitis B) other than shingles vaccine. received the influenza and COVID vaccines  Venipuncture done today in the office  Followup in 6 months

## 2024-09-05 NOTE — PHYSICAL EXAM
[General Appearance - Alert] : alert [General Appearance - In No Acute Distress] : in no acute distress [Sclera] : the sclera and conjunctiva were normal [PERRL With Normal Accommodation] : pupils were equal in size, round, and reactive to light [Extraocular Movements] : extraocular movements were intact [Outer Ear] : the ears and nose were normal in appearance [Oropharynx] : the oropharynx was normal [Neck Appearance] : the appearance of the neck was normal [Neck Cervical Mass (___cm)] : no neck mass was observed [Jugular Venous Distention Increased] : there was no jugular-venous distention [Thyroid Diffuse Enlargement] : the thyroid was not enlarged [Thyroid Nodule] : there were no palpable thyroid nodules [Auscultation Breath Sounds / Voice Sounds] : lungs were clear to auscultation bilaterally [Heart Rate And Rhythm] : heart rate was normal and rhythm regular [Heart Sounds] : normal S1 and S2 [Heart Sounds Gallop] : no gallops [Murmurs] : no murmurs [Heart Sounds Pericardial Friction Rub] : no pericardial rub [Arterial Pulses Carotid] : carotid pulses were normal with no bruits [Abdominal Aorta] : the abdominal aorta was normal [Arterial Pulses Femoral] : femoral pulses were normal without bruits [Full Pulse] : the pedal pulses are present [Edema] : there was no peripheral edema [Veins - Varicosity Changes] : there were no varicosital changes [Bowel Sounds] : normal bowel sounds [Abdomen Soft] : soft [Abdomen Tenderness] : non-tender [Abdomen Mass (___ Cm)] : no abdominal mass palpated [Cervical Lymph Nodes Enlarged Posterior Bilaterally] : posterior cervical [Cervical Lymph Nodes Enlarged Anterior Bilaterally] : anterior cervical [Supraclavicular Lymph Nodes Enlarged Bilaterally] : supraclavicular [Axillary Lymph Nodes Enlarged Bilaterally] : axillary [Femoral Lymph Nodes Enlarged Bilaterally] : femoral [Inguinal Lymph Nodes Enlarged Bilaterally] : inguinal [No Spinal Tenderness] : no spinal tenderness [Abnormal Walk] : normal gait [Nail Clubbing] : no clubbing  or cyanosis of the fingernails [Musculoskeletal - Swelling] : no joint swelling seen [Motor Tone] : muscle strength and tone were normal [Skin Color & Pigmentation] : normal skin color and pigmentation [Skin Turgor] : normal skin turgor [] : no rash [Cranial Nerves] : cranial nerves 2-12 were intact [No Focal Deficits] : no focal deficits [Oriented To Time, Place, And Person] : oriented to person, place, and time [Impaired Insight] : insight and judgment were intact [Affect] : the affect was normal [FreeTextEntry1] : Mild erythema lateral aspects of right first toe without significant tenderness to palpation.

## 2024-09-05 NOTE — HEALTH RISK ASSESSMENT
[Fair] :  ~his/her~ mood as fair [Yes] : Yes [No] : In the past 12 months have you used drugs other than those required for medical reasons? No [Any fall with injury in past year] : Patient reported fall with injury in the past year [0] : 2) Feeling down, depressed, or hopeless: Not at all (0) [PHQ-2 Negative - No further assessment needed] : PHQ-2 Negative - No further assessment needed [Former] : Former [15-19] : 15-19 [> 15 Years] : > 15 Years [None] : None [With Significant Other] : lives with significant other [# of Members in Household ___] :  household currently consist of [unfilled] member(s) [Employed] : employed [High School] : high school [] :  [# Of Children ___] : has [unfilled] children [Sexually Active] : sexually active [Feels Safe at Home] : Feels safe at home [Fully functional (bathing, dressing, toileting, transferring, walking, feeding)] : Fully functional (bathing, dressing, toileting, transferring, walking, feeding) [Fully functional (using the telephone, shopping, preparing meals, housekeeping, doing laundry, using] : Fully functional and needs no help or supervision to perform IADLs (using the telephone, shopping, preparing meals, housekeeping, doing laundry, using transportation, managing medications and managing finances) [Reports changes in hearing] : Reports changes in hearing [Smoke Detector] : smoke detector [Carbon Monoxide Detector] : carbon monoxide detector [Seat Belt] :  uses seat belt [Sunscreen] : uses sunscreen [Reviewed no changes] : Reviewed, no changes [Discussed at today's visit] : Advance Directives Discussed at today's visit [Designated Healthcare Proxy] : Designated healthcare proxy [Name: ___] : Health Care Proxy's Name: [unfilled]  [Good] : ~his/her~ current health as good [2 - 3 times a week (3 pts)] : 2 - 3  times a week (3 points) [1 or 2 (0 pts)] : 1 or 2 (0 points) [No falls in past year] : Patient reported no falls in the past year [NO] : No [Audit-CScore] : 3 [de-identified] : active [de-identified] : good [[id number]] : 0 [Change in mental status noted] : No change in mental status noted [Reports changes in vision] : Reports no changes in vision [Reports changes in dental health] : Reports no changes in dental health [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

## 2024-09-05 NOTE — HEALTH RISK ASSESSMENT
[Fair] :  ~his/her~ mood as fair [Yes] : Yes [No] : In the past 12 months have you used drugs other than those required for medical reasons? No [Any fall with injury in past year] : Patient reported fall with injury in the past year [0] : 2) Feeling down, depressed, or hopeless: Not at all (0) [PHQ-2 Negative - No further assessment needed] : PHQ-2 Negative - No further assessment needed [Former] : Former [15-19] : 15-19 [> 15 Years] : > 15 Years [None] : None [With Significant Other] : lives with significant other [# of Members in Household ___] :  household currently consist of [unfilled] member(s) [Employed] : employed [High School] : high school [] :  [# Of Children ___] : has [unfilled] children [Sexually Active] : sexually active [Feels Safe at Home] : Feels safe at home [Fully functional (bathing, dressing, toileting, transferring, walking, feeding)] : Fully functional (bathing, dressing, toileting, transferring, walking, feeding) [Fully functional (using the telephone, shopping, preparing meals, housekeeping, doing laundry, using] : Fully functional and needs no help or supervision to perform IADLs (using the telephone, shopping, preparing meals, housekeeping, doing laundry, using transportation, managing medications and managing finances) [Reports changes in hearing] : Reports changes in hearing [Smoke Detector] : smoke detector [Carbon Monoxide Detector] : carbon monoxide detector [Seat Belt] :  uses seat belt [Sunscreen] : uses sunscreen [Reviewed no changes] : Reviewed, no changes [Discussed at today's visit] : Advance Directives Discussed at today's visit [Designated Healthcare Proxy] : Designated healthcare proxy [Name: ___] : Health Care Proxy's Name: [unfilled]  [Good] : ~his/her~ current health as good [2 - 3 times a week (3 pts)] : 2 - 3  times a week (3 points) [1 or 2 (0 pts)] : 1 or 2 (0 points) [No falls in past year] : Patient reported no falls in the past year [NO] : No [Audit-CScore] : 3 [de-identified] : active [de-identified] : good [[id number]] : 0 [Change in mental status noted] : No change in mental status noted [Reports changes in vision] : Reports no changes in vision [Reports changes in dental health] : Reports no changes in dental health [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

## 2024-09-05 NOTE — PHYSICAL EXAM
[General Appearance - Alert] : alert [General Appearance - In No Acute Distress] : in no acute distress [Sclera] : the sclera and conjunctiva were normal [PERRL With Normal Accommodation] : pupils were equal in size, round, and reactive to light [Extraocular Movements] : extraocular movements were intact [Outer Ear] : the ears and nose were normal in appearance [Oropharynx] : the oropharynx was normal [Neck Appearance] : the appearance of the neck was normal [Neck Cervical Mass (___cm)] : no neck mass was observed [Jugular Venous Distention Increased] : there was no jugular-venous distention [Thyroid Diffuse Enlargement] : the thyroid was not enlarged [Thyroid Nodule] : there were no palpable thyroid nodules [Auscultation Breath Sounds / Voice Sounds] : lungs were clear to auscultation bilaterally [Heart Rate And Rhythm] : heart rate was normal and rhythm regular [Heart Sounds] : normal S1 and S2 [Heart Sounds Gallop] : no gallops [Murmurs] : no murmurs [Heart Sounds Pericardial Friction Rub] : no pericardial rub [Arterial Pulses Carotid] : carotid pulses were normal with no bruits [Abdominal Aorta] : the abdominal aorta was normal [Arterial Pulses Femoral] : femoral pulses were normal without bruits [Full Pulse] : the pedal pulses are present [Edema] : there was no peripheral edema [Veins - Varicosity Changes] : there were no varicosital changes [Bowel Sounds] : normal bowel sounds [Abdomen Soft] : soft [Abdomen Tenderness] : non-tender [Abdomen Mass (___ Cm)] : no abdominal mass palpated [Cervical Lymph Nodes Enlarged Posterior Bilaterally] : posterior cervical [Cervical Lymph Nodes Enlarged Anterior Bilaterally] : anterior cervical [Axillary Lymph Nodes Enlarged Bilaterally] : axillary [Supraclavicular Lymph Nodes Enlarged Bilaterally] : supraclavicular [Femoral Lymph Nodes Enlarged Bilaterally] : femoral [Inguinal Lymph Nodes Enlarged Bilaterally] : inguinal [No Spinal Tenderness] : no spinal tenderness [Abnormal Walk] : normal gait [Nail Clubbing] : no clubbing  or cyanosis of the fingernails [Musculoskeletal - Swelling] : no joint swelling seen [Motor Tone] : muscle strength and tone were normal [Skin Color & Pigmentation] : normal skin color and pigmentation [Skin Turgor] : normal skin turgor [] : no rash [Cranial Nerves] : cranial nerves 2-12 were intact [No Focal Deficits] : no focal deficits [Oriented To Time, Place, And Person] : oriented to person, place, and time [Impaired Insight] : insight and judgment were intact [Affect] : the affect was normal [FreeTextEntry1] : Mild erythema lateral aspects of right first toe without significant tenderness to palpation.

## 2024-09-05 NOTE — HISTORY OF PRESENT ILLNESS
[FreeTextEntry1] : 78-year-old female presents for her yearly physical with history of atrial fibrillation for which she had ablation 2014 with cardiology followup and remains in sinus rhythm. For this she continues on an aspirin daily.  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 Evaluation was done including MRIs which showed cirrhosis which was felt to be due to patient's chronic alcohol intake. Patient's liver functions have gone back to normal. She was following up with hepatology but has not seen them over 4 years.  Patient refuses follow-up with hepatology or any further liver imaging. Against medical advice the patient continues to drink alcohol stating she drinks about 2-3 days a week. She knows she he should not be drinking at all.  The patient had known right carotid stenosis which progressed and she had a right carotid endarterectomy July 27, 2021 which went well. Continues to follow with vascular  Patient with hyperlipidemia on Crestor and Zetia.  No associated liver issues  She did have a right total knee replacement October 2014 which went fairly well but still has occasional discomfort with a known Baker's cyst.  The patient has seen neurology in the past stating that she had multiple sclerosis and has had no progression of symptoms and the diagnosis is in question. She did follow-up with neurology February 2024 secondary to increased headaches with MRI of the brain done with abnormalities more compatible with microvascular changes and less likely demyelinating disease. She also was sent for MRIs of the cervical and thoracic spine showing degenerative changes. Headaches are better but patient's main complaint is persistent at times debilitating pain in her neck for which she has seen acupuncture with some benefit but acupuncturist is away.  Occasional anti-inflammatory with benefit.  Also complains of 3 to 4 months of pain of the right first toe distally stating that the pain mainly occurs at night.  Not when she is up and about during the day.  Some redness.  No trauma.  Otherwise she is feeling relatively well without chest pain, palpitations or shortness of breath. Her weight is about the same without any regular exercise regimen.  The patient had issues with bowel changes and diarrhea and had GI workup with diagnosis of lymphocytic colitis. She follows with GI periodically.  Status post fall October 2022 with fractured right wrist needing surgery with continued mild symptoms.  Now with issues of right shoulder and seeing orthopedic. Also complaining of increased issues left hip/knee.  Patient with anxiety and depression on Zoloft 25 mg daily stating that she continues to have difficulty coping with mainly the care of her  who is totally dependent having had amputations.  Patient doing the best she can.  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

## 2024-09-05 NOTE — COUNSELING
[Potential consequences of obesity discussed] : Potential consequences of obesity discussed [Benefits of weight loss discussed] : Benefits of weight loss discussed [Encouraged to increase physical activity] : Encouraged to increase physical activity [Healthy eating counseling provided] : healthy eating [Low Fat Diet] : Low fat diet [Decrease Portions] : Decrease food portions [Walking] : Walking [None] : None [Needs reinforcement, provided] : Patient needs reinforcement on understanding lifestyle changes and  the steps needed to achieve self management goals and reinforcement was provided [de-identified] : The patient again was told she should totally abstain from alcohol

## 2024-09-07 LAB
25(OH)D3 SERPL-MCNC: 41.1 NG/ML
ALBUMIN SERPL ELPH-MCNC: 4.1 G/DL
ALP BLD-CCNC: 72 U/L
ALT SERPL-CCNC: 25 U/L
ANION GAP SERPL CALC-SCNC: 13 MMOL/L
APPEARANCE: CLEAR
AST SERPL-CCNC: 22 U/L
BACTERIA: NEGATIVE /HPF
BASOPHILS # BLD AUTO: 0.08 K/UL
BASOPHILS NFR BLD AUTO: 1.2 %
BILIRUB SERPL-MCNC: 0.8 MG/DL
BILIRUBIN URINE: NEGATIVE
BLOOD URINE: NEGATIVE
BUN SERPL-MCNC: 16 MG/DL
CALCIUM SERPL-MCNC: 9.6 MG/DL
CAST: 0 /LPF
CHLORIDE SERPL-SCNC: 106 MMOL/L
CHOLEST SERPL-MCNC: 169 MG/DL
CO2 SERPL-SCNC: 24 MMOL/L
COLOR: YELLOW
CREAT SERPL-MCNC: 0.69 MG/DL
EGFR: 89 ML/MIN/1.73M2
EOSINOPHIL # BLD AUTO: 0.21 K/UL
EOSINOPHIL NFR BLD AUTO: 3.1 %
EPITHELIAL CELLS: 5 /HPF
ESTIMATED AVERAGE GLUCOSE: 111 MG/DL
GLUCOSE QUALITATIVE U: NEGATIVE MG/DL
GLUCOSE SERPL-MCNC: 102 MG/DL
HBA1C MFR BLD HPLC: 5.5 %
HCT VFR BLD CALC: 42.6 %
HDLC SERPL-MCNC: 61 MG/DL
HGB BLD-MCNC: 13.3 G/DL
IMM GRANULOCYTES NFR BLD AUTO: 0.1 %
KETONES URINE: NEGATIVE MG/DL
LDLC SERPL CALC-MCNC: 91 MG/DL
LEUKOCYTE ESTERASE URINE: ABNORMAL
LYMPHOCYTES # BLD AUTO: 1.38 K/UL
LYMPHOCYTES NFR BLD AUTO: 20.5 %
MAN DIFF?: NORMAL
MCHC RBC-ENTMCNC: 31.2 GM/DL
MCHC RBC-ENTMCNC: 32.6 PG
MCV RBC AUTO: 104.4 FL
MICROSCOPIC-UA: NORMAL
MONOCYTES # BLD AUTO: 0.83 K/UL
MONOCYTES NFR BLD AUTO: 12.3 %
NEUTROPHILS # BLD AUTO: 4.23 K/UL
NEUTROPHILS NFR BLD AUTO: 62.8 %
NITRITE URINE: NEGATIVE
NONHDLC SERPL-MCNC: 108 MG/DL
PH URINE: 6.5
PLATELET # BLD AUTO: 321 K/UL
POTASSIUM SERPL-SCNC: 4.6 MMOL/L
PROT SERPL-MCNC: 6.6 G/DL
PROTEIN URINE: NEGATIVE MG/DL
RBC # BLD: 4.08 M/UL
RBC # FLD: 15.7 %
RED BLOOD CELLS URINE: 2 /HPF
SODIUM SERPL-SCNC: 142 MMOL/L
SPECIFIC GRAVITY URINE: 1.01
TRIGL SERPL-MCNC: 95 MG/DL
URATE SERPL-MCNC: 5.5 MG/DL
UROBILINOGEN URINE: 0.2 MG/DL
WBC # FLD AUTO: 6.74 K/UL
WHITE BLOOD CELLS URINE: 10 /HPF

## 2024-11-12 ENCOUNTER — APPOINTMENT (OUTPATIENT)
Dept: UROGYNECOLOGY | Facility: CLINIC | Age: 79
End: 2024-11-12

## 2024-11-30 DIAGNOSIS — M17.11 UNILATERAL PRIMARY OSTEOARTHRITIS, RIGHT KNEE: ICD-10-CM

## 2024-12-02 ENCOUNTER — APPOINTMENT (OUTPATIENT)
Dept: ORTHOPEDIC SURGERY | Facility: CLINIC | Age: 79
End: 2024-12-02
Payer: MEDICARE

## 2024-12-02 VITALS — WEIGHT: 172 LBS | HEIGHT: 62 IN | BODY MASS INDEX: 31.65 KG/M2

## 2024-12-02 DIAGNOSIS — Z96.659 PRESENCE OF UNSPECIFIED ARTIFICIAL KNEE JOINT: ICD-10-CM

## 2024-12-02 DIAGNOSIS — M70.61 TROCHANTERIC BURSITIS, RIGHT HIP: ICD-10-CM

## 2024-12-02 PROCEDURE — 73502 X-RAY EXAM HIP UNI 2-3 VIEWS: CPT | Mod: RT

## 2024-12-02 PROCEDURE — G2211 COMPLEX E/M VISIT ADD ON: CPT

## 2024-12-02 PROCEDURE — 99213 OFFICE O/P EST LOW 20 MIN: CPT

## 2024-12-02 PROCEDURE — 73562 X-RAY EXAM OF KNEE 3: CPT | Mod: RT

## 2024-12-17 ENCOUNTER — APPOINTMENT (OUTPATIENT)
Dept: UROGYNECOLOGY | Facility: CLINIC | Age: 79
End: 2024-12-17

## 2024-12-17 VITALS
BODY MASS INDEX: 31.28 KG/M2 | WEIGHT: 170 LBS | SYSTOLIC BLOOD PRESSURE: 145 MMHG | HEART RATE: 80 BPM | DIASTOLIC BLOOD PRESSURE: 87 MMHG | HEIGHT: 62 IN

## 2024-12-17 LAB
BILIRUB UR QL STRIP: NEGATIVE
CLARITY UR: CLEAR
COLLECTION METHOD: NORMAL
GLUCOSE UR-MCNC: NEGATIVE
HCG UR QL: 0.2
HGB UR QL STRIP.AUTO: NEGATIVE
KETONES UR-MCNC: NEGATIVE
LEUKOCYTE ESTERASE UR QL STRIP: NORMAL
NITRITE UR QL STRIP: NEGATIVE
PH UR STRIP: 5.5
PROT UR STRIP-MCNC: NEGATIVE
SP GR UR STRIP: 1.02

## 2024-12-17 PROCEDURE — 51701 INSERT BLADDER CATHETER: CPT | Mod: 59

## 2024-12-17 PROCEDURE — 81003 URINALYSIS AUTO W/O SCOPE: CPT | Mod: QW

## 2024-12-17 PROCEDURE — 99459 PELVIC EXAMINATION: CPT

## 2024-12-17 PROCEDURE — 99204 OFFICE O/P NEW MOD 45 MIN: CPT | Mod: 25

## 2024-12-18 LAB
APPEARANCE: CLEAR
BACTERIA: NEGATIVE /HPF
BILIRUBIN URINE: NEGATIVE
BLOOD URINE: NEGATIVE
CALCIUM OXALATE CRYSTALS: PRESENT
CAST: 1 /LPF
COLOR: YELLOW
EPITHELIAL CELLS: 0 /HPF
GLUCOSE QUALITATIVE U: NEGATIVE MG/DL
KETONES URINE: ABNORMAL MG/DL
LEUKOCYTE ESTERASE URINE: NEGATIVE
MICROSCOPIC-UA: NORMAL
NITRITE URINE: NEGATIVE
PH URINE: 5.5
PROTEIN URINE: NEGATIVE MG/DL
RED BLOOD CELLS URINE: 1 /HPF
REVIEW: NORMAL
SPECIFIC GRAVITY URINE: 1.02
UROBILINOGEN URINE: 0.2 MG/DL
WHITE BLOOD CELLS URINE: 0 /HPF

## 2024-12-19 ENCOUNTER — RX RENEWAL (OUTPATIENT)
Age: 79
End: 2024-12-19

## 2024-12-19 LAB — BACTERIA UR CULT: NORMAL

## 2024-12-20 RX ORDER — VIBEGRON 75 MG/1
75 TABLET, FILM COATED ORAL
Qty: 90 | Refills: 3 | Status: DISCONTINUED | COMMUNITY
Start: 2024-12-17 | End: 2024-12-20

## 2024-12-20 RX ORDER — MIRABEGRON 25 MG/1
25 TABLET, FILM COATED, EXTENDED RELEASE ORAL
Qty: 30 | Refills: 0 | Status: ACTIVE | COMMUNITY
Start: 2024-12-20 | End: 1900-01-01

## 2024-12-23 ENCOUNTER — RX RENEWAL (OUTPATIENT)
Age: 79
End: 2024-12-23

## 2025-01-14 ENCOUNTER — APPOINTMENT (OUTPATIENT)
Dept: UROGYNECOLOGY | Facility: CLINIC | Age: 80
End: 2025-01-14

## 2025-01-14 VITALS
HEART RATE: 78 BPM | DIASTOLIC BLOOD PRESSURE: 83 MMHG | WEIGHT: 170 LBS | BODY MASS INDEX: 31.28 KG/M2 | HEIGHT: 62 IN | SYSTOLIC BLOOD PRESSURE: 138 MMHG

## 2025-01-14 DIAGNOSIS — R35.0 FREQUENCY OF MICTURITION: ICD-10-CM

## 2025-01-14 PROCEDURE — 99213 OFFICE O/P EST LOW 20 MIN: CPT

## 2025-02-03 ENCOUNTER — NON-APPOINTMENT (OUTPATIENT)
Age: 80
End: 2025-02-03

## 2025-02-03 ENCOUNTER — OFFICE (OUTPATIENT)
Dept: URBAN - METROPOLITAN AREA CLINIC 115 | Facility: CLINIC | Age: 80
Setting detail: OPHTHALMOLOGY
End: 2025-02-03
Payer: MEDICARE

## 2025-02-03 DIAGNOSIS — H01.004: ICD-10-CM

## 2025-02-03 DIAGNOSIS — Z96.1: ICD-10-CM

## 2025-02-03 DIAGNOSIS — H35.3131: ICD-10-CM

## 2025-02-03 DIAGNOSIS — H04.123: ICD-10-CM

## 2025-02-03 DIAGNOSIS — H01.001: ICD-10-CM

## 2025-02-03 DIAGNOSIS — B88.0: ICD-10-CM

## 2025-02-03 PROCEDURE — 99213 OFFICE O/P EST LOW 20 MIN: CPT | Performed by: OPHTHALMOLOGY

## 2025-02-03 ASSESSMENT — LID EXAM ASSESSMENTS
OD_BLEPHARITIS: RUL 1+
OS_BLEPHARITIS: LUL 1+

## 2025-02-03 ASSESSMENT — REFRACTION_MANIFEST
OD_AXIS: 090
OU_VA: 20/25-2
OS_CYLINDER: SPH
OS_SPHERE: +1.00
OS_CYLINDER: -0.75
OS_CYLINDER: -0.75
OD_SPHERE: +1.00
OD_ADD: +2.50
OD_VA1: 20/25-2
OD_AXIS: 090
OS_AXIS: 095
OD_ADD: +3.50
OD_ADD: +2.50
OD_VA1: 20/25--2
OD_VA1: 20/25-2
OS_SPHERE: +1.00
OD_VA1: 20/20-
OD_VA1: 20/20
OS_VA1: 20/40
OS_AXIS: 095
OS_SPHERE: +2.00
OD_SPHERE: +1.00
OD_AXIS: 105
OD_SPHERE: +0.75
OD_AXIS: 105
OS_CYLINDER: -0.50
OD_CYLINDER: -0.25
OU_VA: 20/25-2
OD_SPHERE: +1.75
OD_CYLINDER: -0.75
OS_CYLINDER: -0.50
OS_SPHERE: +2.25
OD_SPHERE: +1.00
OS_SPHERE: +2.00
OS_ADD: +3.50
OD_ADD: +3.50
OD_SPHERE: +1.75
OS_SPHERE: +2.25
OS_ADD: +3.50
OS_VA1: 20/30-2
OS_ADD: +2.50
OD_ADD: +2.50
OS_ADD: +2.50
OD_CYLINDER: SPH
OS_VA1: 20/30
OD_CYLINDER: -0.25
OD_ADD: +2.50
OS_AXIS: 120
OS_ADD: +2.50
OS_AXIS: 1220
OS_VA1: 20/40
OS_ADD: +2.50
OS_VA1: 20/30-2
OD_CYLINDER: -0.75

## 2025-02-03 ASSESSMENT — REFRACTION_CURRENTRX
OS_CYLINDER: -1.50
OD_OVR_VA: 20/
OS_SPHERE: +2.75
OS_OVR_VA: 20/
OD_VPRISM_DIRECTION: SV
OD_ADD: +1.50
OS_OVR_VA: 20/
OS_OVR_VA: 20/
OD_OVR_VA: 20/
OD_SPHERE: +2.25
OD_OVR_VA: 20/
OD_AXIS: 061
OS_ADD: +1.50
OS_AXIS: 180
OD_SPHERE: +3.25
OD_CYLINDER: -1.25
OD_CYLINDER: -0.25
OS_AXIS: 121
OS_AXIS: 115
OS_CYLINDER: -0.50
OD_VPRISM_DIRECTION: BF
OD_AXIS: 118
OS_SPHERE: +1.50
OD_AXIS: 117
OS_VPRISM_DIRECTION: SV
OS_VPRISM_DIRECTION: BF
OS_CYLINDER: 0.00
OS_ADD: +3.00
OD_ADD: +2.75
OS_SPHERE: +2.25
OD_SPHERE: +2.75
OS_SPHERE: +3.75
OD_CYLINDER: -0.25
OD_SPHERE: +1.50

## 2025-02-03 ASSESSMENT — VISUAL ACUITY
OD_BCVA: 20/60
OS_BCVA: 20/25-1

## 2025-02-03 ASSESSMENT — TONOMETRY
OS_IOP_MMHG: 16
OD_IOP_MMHG: 19

## 2025-02-03 ASSESSMENT — REFRACTION_AUTOREFRACTION
OD_CYLINDER: -0.50
OS_SPHERE: +1.75
OD_SPHERE: +1.25
OD_AXIS: 107
OS_AXIS: 113
OS_CYLINDER: -0.25

## 2025-02-03 ASSESSMENT — CONFRONTATIONAL VISUAL FIELD TEST (CVF)
OD_FINDINGS: FULL
OS_FINDINGS: FULL

## 2025-02-06 ENCOUNTER — OFFICE (OUTPATIENT)
Dept: URBAN - METROPOLITAN AREA CLINIC 115 | Facility: CLINIC | Age: 80
Setting detail: OPHTHALMOLOGY
End: 2025-02-06
Payer: MEDICARE

## 2025-02-06 DIAGNOSIS — H35.3132: ICD-10-CM

## 2025-02-06 DIAGNOSIS — H53.002: ICD-10-CM

## 2025-02-06 PROCEDURE — 92235 FLUORESCEIN ANGRPH MLTIFRAME: CPT | Performed by: OPHTHALMOLOGY

## 2025-02-06 PROCEDURE — 92134 CPTRZ OPH DX IMG PST SGM RTA: CPT | Performed by: OPHTHALMOLOGY

## 2025-02-06 PROCEDURE — 92014 COMPRE OPH EXAM EST PT 1/>: CPT | Performed by: OPHTHALMOLOGY

## 2025-02-06 ASSESSMENT — REFRACTION_CURRENTRX
OD_SPHERE: +3.25
OD_VPRISM_DIRECTION: BF
OD_AXIS: 118
OS_VPRISM_DIRECTION: BF
OD_CYLINDER: -1.25
OS_SPHERE: +3.75
OD_SPHERE: +1.50
OS_ADD: +3.00
OD_AXIS: 061
OS_AXIS: 121
OS_SPHERE: +2.75
OS_OVR_VA: 20/
OD_ADD: +1.50
OS_OVR_VA: 20/
OD_SPHERE: +2.75
OS_CYLINDER: -0.50
OS_VPRISM_DIRECTION: SV
OD_CYLINDER: -0.25
OD_CYLINDER: -0.25
OD_VPRISM_DIRECTION: SV
OD_ADD: +2.75
OS_SPHERE: +1.50
OS_ADD: +1.50
OD_OVR_VA: 20/
OD_OVR_VA: 20/
OS_AXIS: 115
OD_AXIS: 117
OS_SPHERE: +2.25
OS_OVR_VA: 20/
OD_SPHERE: +2.25
OS_CYLINDER: 0.00
OS_CYLINDER: -1.50
OD_OVR_VA: 20/
OS_AXIS: 180

## 2025-02-06 ASSESSMENT — TONOMETRY
OS_IOP_MMHG: 17
OD_IOP_MMHG: 17

## 2025-02-06 ASSESSMENT — REFRACTION_AUTOREFRACTION
OD_CYLINDER: -0.50
OD_SPHERE: +1.25
OS_CYLINDER: -0.25
OS_AXIS: 113
OD_AXIS: 107
OS_SPHERE: +1.75

## 2025-02-06 ASSESSMENT — CONFRONTATIONAL VISUAL FIELD TEST (CVF)
OS_FINDINGS: FULL
OD_FINDINGS: FULL

## 2025-02-06 ASSESSMENT — VISUAL ACUITY
OD_BCVA: 20/70
OS_BCVA: 20/30-1

## 2025-02-06 ASSESSMENT — LID EXAM ASSESSMENTS
OD_BLEPHARITIS: RUL 1+
OS_BLEPHARITIS: LUL 1+

## 2025-02-07 ENCOUNTER — NON-APPOINTMENT (OUTPATIENT)
Age: 80
End: 2025-02-07

## 2025-03-11 ENCOUNTER — APPOINTMENT (OUTPATIENT)
Dept: INTERNAL MEDICINE | Facility: CLINIC | Age: 80
End: 2025-03-11
Payer: MEDICARE

## 2025-03-11 VITALS
OXYGEN SATURATION: 97 % | WEIGHT: 162 LBS | RESPIRATION RATE: 14 BRPM | HEIGHT: 62 IN | DIASTOLIC BLOOD PRESSURE: 75 MMHG | HEART RATE: 72 BPM | SYSTOLIC BLOOD PRESSURE: 125 MMHG | BODY MASS INDEX: 29.81 KG/M2

## 2025-03-11 DIAGNOSIS — E78.00 PURE HYPERCHOLESTEROLEMIA, UNSPECIFIED: ICD-10-CM

## 2025-03-11 DIAGNOSIS — F32.A ANXIETY DISORDER, UNSPECIFIED: ICD-10-CM

## 2025-03-11 DIAGNOSIS — F41.9 ANXIETY DISORDER, UNSPECIFIED: ICD-10-CM

## 2025-03-11 DIAGNOSIS — Z79.899 OTHER LONG TERM (CURRENT) DRUG THERAPY: ICD-10-CM

## 2025-03-11 DIAGNOSIS — K76.0 FATTY (CHANGE OF) LIVER, NOT ELSEWHERE CLASSIFIED: ICD-10-CM

## 2025-03-11 DIAGNOSIS — R73.09 OTHER ABNORMAL GLUCOSE: ICD-10-CM

## 2025-03-11 PROCEDURE — 36415 COLL VENOUS BLD VENIPUNCTURE: CPT

## 2025-03-11 PROCEDURE — 99214 OFFICE O/P EST MOD 30 MIN: CPT

## 2025-03-11 PROCEDURE — G2211 COMPLEX E/M VISIT ADD ON: CPT

## 2025-03-12 LAB
ALBUMIN SERPL ELPH-MCNC: 4 G/DL
ALP BLD-CCNC: 69 U/L
ALT SERPL-CCNC: 21 U/L
ANION GAP SERPL CALC-SCNC: 10 MMOL/L
AST SERPL-CCNC: 25 U/L
BASOPHILS # BLD AUTO: 0.05 K/UL
BASOPHILS NFR BLD AUTO: 0.9 %
BILIRUB SERPL-MCNC: 0.9 MG/DL
BUN SERPL-MCNC: 11 MG/DL
CALCIUM SERPL-MCNC: 9.6 MG/DL
CHLORIDE SERPL-SCNC: 108 MMOL/L
CHOLEST SERPL-MCNC: 171 MG/DL
CO2 SERPL-SCNC: 26 MMOL/L
CREAT SERPL-MCNC: 0.71 MG/DL
EGFRCR SERPLBLD CKD-EPI 2021: 87 ML/MIN/1.73M2
EOSINOPHIL # BLD AUTO: 0.17 K/UL
EOSINOPHIL NFR BLD AUTO: 3.1 %
ESTIMATED AVERAGE GLUCOSE: 117 MG/DL
GLUCOSE SERPL-MCNC: 97 MG/DL
HBA1C MFR BLD HPLC: 5.7 %
HCT VFR BLD CALC: 37.3 %
HDLC SERPL-MCNC: 69 MG/DL
HGB BLD-MCNC: 12.3 G/DL
IMM GRANULOCYTES NFR BLD AUTO: 0.2 %
LDLC SERPL CALC-MCNC: 88 MG/DL
LYMPHOCYTES # BLD AUTO: 1.15 K/UL
LYMPHOCYTES NFR BLD AUTO: 21.1 %
MAN DIFF?: NORMAL
MCHC RBC-ENTMCNC: 33 G/DL
MCHC RBC-ENTMCNC: 35.1 PG
MCV RBC AUTO: 106.6 FL
MONOCYTES # BLD AUTO: 0.7 K/UL
MONOCYTES NFR BLD AUTO: 12.8 %
NEUTROPHILS # BLD AUTO: 3.38 K/UL
NEUTROPHILS NFR BLD AUTO: 61.9 %
NONHDLC SERPL-MCNC: 102 MG/DL
PLATELET # BLD AUTO: 291 K/UL
POTASSIUM SERPL-SCNC: 4.9 MMOL/L
PROT SERPL-MCNC: 6.3 G/DL
RBC # BLD: 3.5 M/UL
RBC # FLD: 15.1 %
SODIUM SERPL-SCNC: 144 MMOL/L
TRIGL SERPL-MCNC: 74 MG/DL
TSH SERPL-ACNC: 1.96 UIU/ML
WBC # FLD AUTO: 5.46 K/UL

## 2025-04-02 ENCOUNTER — APPOINTMENT (OUTPATIENT)
Dept: ORTHOPEDIC SURGERY | Facility: CLINIC | Age: 80
End: 2025-04-02
Payer: MEDICARE

## 2025-04-02 VITALS
WEIGHT: 162 LBS | DIASTOLIC BLOOD PRESSURE: 82 MMHG | HEIGHT: 62 IN | BODY MASS INDEX: 29.81 KG/M2 | SYSTOLIC BLOOD PRESSURE: 141 MMHG

## 2025-04-02 DIAGNOSIS — Z96.651 PRESENCE OF RIGHT ARTIFICIAL KNEE JOINT: ICD-10-CM

## 2025-04-02 DIAGNOSIS — Z96.651 AFTERCARE FOLLOWING JOINT REPLACEMENT SURGERY: ICD-10-CM

## 2025-04-02 DIAGNOSIS — Z47.1 AFTERCARE FOLLOWING JOINT REPLACEMENT SURGERY: ICD-10-CM

## 2025-04-02 PROCEDURE — 20610 DRAIN/INJ JOINT/BURSA W/O US: CPT | Mod: RT

## 2025-04-02 PROCEDURE — 73562 X-RAY EXAM OF KNEE 3: CPT | Mod: RT

## 2025-04-02 PROCEDURE — 99214 OFFICE O/P EST MOD 30 MIN: CPT | Mod: 25

## 2025-04-02 RX ORDER — MELOXICAM 15 MG/1
15 TABLET ORAL DAILY
Qty: 90 | Refills: 1 | Status: ACTIVE | COMMUNITY
Start: 2025-04-02 | End: 1900-01-01

## 2025-04-03 ENCOUNTER — NON-APPOINTMENT (OUTPATIENT)
Age: 80
End: 2025-04-03

## 2025-04-03 ENCOUNTER — OFFICE (OUTPATIENT)
Dept: URBAN - METROPOLITAN AREA CLINIC 115 | Facility: CLINIC | Age: 80
Setting detail: OPHTHALMOLOGY
End: 2025-04-03
Payer: MEDICARE

## 2025-04-03 DIAGNOSIS — H01.001: ICD-10-CM

## 2025-04-03 DIAGNOSIS — B88.0: ICD-10-CM

## 2025-04-03 DIAGNOSIS — H01.004: ICD-10-CM

## 2025-04-03 PROCEDURE — 92012 INTRM OPH EXAM EST PATIENT: CPT | Performed by: OPHTHALMOLOGY

## 2025-04-03 ASSESSMENT — REFRACTION_CURRENTRX
OS_SPHERE: +3.75
OS_SPHERE: +2.25
OS_AXIS: 121
OD_SPHERE: +2.25
OD_AXIS: 061
OS_ADD: +1.50
OS_AXIS: 180
OS_OVR_VA: 20/
OD_SPHERE: +1.50
OD_AXIS: 117
OS_CYLINDER: -1.50
OD_ADD: +2.75
OS_CYLINDER: 0.00
OD_CYLINDER: -0.25
OS_CYLINDER: -0.50
OD_CYLINDER: -1.25
OD_SPHERE: +3.25
OS_OVR_VA: 20/
OD_VPRISM_DIRECTION: SV
OD_OVR_VA: 20/
OD_AXIS: 118
OD_ADD: +1.50
OS_VPRISM_DIRECTION: BF
OS_OVR_VA: 20/
OS_SPHERE: +2.75
OD_CYLINDER: -0.25
OS_AXIS: 115
OD_OVR_VA: 20/
OS_SPHERE: +1.50
OD_OVR_VA: 20/
OS_VPRISM_DIRECTION: SV
OD_VPRISM_DIRECTION: BF
OS_ADD: +3.00
OD_SPHERE: +2.75

## 2025-04-03 ASSESSMENT — REFRACTION_AUTOREFRACTION
OD_SPHERE: +1.25
OS_SPHERE: +2.25
OS_CYLINDER: -0.75
OD_AXIS: 098
OD_CYLINDER: -0.75
OS_AXIS: 110

## 2025-04-03 ASSESSMENT — LID EXAM ASSESSMENTS
OS_BLEPHARITIS: LUL 1+
OD_BLEPHARITIS: RUL 1+
OS_COMMENTS: TRACE SLEEVING
OD_COMMENTS: TRACE SLEEVING

## 2025-04-03 ASSESSMENT — VISUAL ACUITY
OD_BCVA: 20/60-1
OS_BCVA: 20/30

## 2025-04-03 ASSESSMENT — CONFRONTATIONAL VISUAL FIELD TEST (CVF)
OS_FINDINGS: FULL
OD_FINDINGS: FULL

## 2025-04-03 ASSESSMENT — TONOMETRY
OD_IOP_MMHG: 17
OS_IOP_MMHG: 18

## 2025-04-10 ENCOUNTER — OFFICE (OUTPATIENT)
Dept: URBAN - METROPOLITAN AREA CLINIC 115 | Facility: CLINIC | Age: 80
Setting detail: OPHTHALMOLOGY
End: 2025-04-10
Payer: MEDICARE

## 2025-04-10 DIAGNOSIS — H35.3132: ICD-10-CM

## 2025-04-10 PROCEDURE — 99213 OFFICE O/P EST LOW 20 MIN: CPT | Performed by: OPHTHALMOLOGY

## 2025-04-10 PROCEDURE — 92134 CPTRZ OPH DX IMG PST SGM RTA: CPT | Performed by: OPHTHALMOLOGY

## 2025-04-10 ASSESSMENT — REFRACTION_CURRENTRX
OD_OVR_VA: 20/
OD_CYLINDER: -0.25
OS_CYLINDER: 0.00
OD_AXIS: 117
OS_AXIS: 121
OD_AXIS: 061
OD_OVR_VA: 20/
OS_SPHERE: +3.75
OD_CYLINDER: -1.25
OD_SPHERE: +3.25
OD_VPRISM_DIRECTION: SV
OS_OVR_VA: 20/
OD_OVR_VA: 20/
OS_SPHERE: +2.25
OD_ADD: +2.75
OS_VPRISM_DIRECTION: BF
OS_ADD: +3.00
OS_CYLINDER: -1.50
OS_OVR_VA: 20/
OD_AXIS: 118
OS_VPRISM_DIRECTION: SV
OD_CYLINDER: -0.25
OD_ADD: +1.50
OS_SPHERE: +1.50
OS_AXIS: 115
OS_OVR_VA: 20/
OD_SPHERE: +1.50
OS_CYLINDER: -0.50
OD_VPRISM_DIRECTION: BF
OD_SPHERE: +2.75
OS_ADD: +1.50
OS_SPHERE: +2.75
OD_SPHERE: +2.25
OS_AXIS: 180

## 2025-04-10 ASSESSMENT — REFRACTION_AUTOREFRACTION
OD_AXIS: 098
OS_AXIS: 110
OS_SPHERE: +2.25
OS_CYLINDER: -0.75
OD_SPHERE: +1.25
OD_CYLINDER: -0.75

## 2025-04-10 ASSESSMENT — VISUAL ACUITY
OS_BCVA: 20/25
OD_BCVA: 20/80

## 2025-04-10 ASSESSMENT — LID EXAM ASSESSMENTS
OD_BLEPHARITIS: RUL 1+
OS_BLEPHARITIS: LUL 1+
OS_COMMENTS: TRACE SLEEVING
OD_COMMENTS: TRACE SLEEVING

## 2025-04-10 ASSESSMENT — CONFRONTATIONAL VISUAL FIELD TEST (CVF)
OS_FINDINGS: FULL
OD_FINDINGS: FULL

## 2025-04-10 ASSESSMENT — TONOMETRY
OD_IOP_MMHG: 16
OS_IOP_MMHG: 13

## 2025-04-17 NOTE — ED PROVIDER NOTE - IV ALTEPLASE EXCL REL HIDDEN
[FreeTextEntry1] : CPE- Well exam, comprehensive labs ordered. PAP up to date. Follow up labs, drawn in office today. [Vaccines Reviewed] : Immunizations reviewed today. Please see immunization details in the vaccine log within the immunization flowsheet.  show

## 2025-04-18 ENCOUNTER — RX RENEWAL (OUTPATIENT)
Age: 80
End: 2025-04-18

## 2025-04-21 ENCOUNTER — RX RENEWAL (OUTPATIENT)
Age: 80
End: 2025-04-21

## 2025-05-13 ENCOUNTER — APPOINTMENT (OUTPATIENT)
Dept: UROGYNECOLOGY | Facility: CLINIC | Age: 80
End: 2025-05-13

## 2025-05-13 VITALS — HEART RATE: 72 BPM | SYSTOLIC BLOOD PRESSURE: 122 MMHG | DIASTOLIC BLOOD PRESSURE: 76 MMHG

## 2025-05-13 DIAGNOSIS — R39.15 URGENCY OF URINATION: ICD-10-CM

## 2025-05-13 DIAGNOSIS — R35.0 FREQUENCY OF MICTURITION: ICD-10-CM

## 2025-05-13 DIAGNOSIS — R32 UNSPECIFIED URINARY INCONTINENCE: ICD-10-CM

## 2025-05-13 PROCEDURE — 51798 US URINE CAPACITY MEASURE: CPT

## 2025-05-13 PROCEDURE — 99214 OFFICE O/P EST MOD 30 MIN: CPT

## 2025-06-12 ENCOUNTER — NON-APPOINTMENT (OUTPATIENT)
Age: 80
End: 2025-06-12

## 2025-06-12 ENCOUNTER — OFFICE (OUTPATIENT)
Dept: URBAN - METROPOLITAN AREA CLINIC 115 | Facility: CLINIC | Age: 80
Setting detail: OPHTHALMOLOGY
End: 2025-06-12
Payer: MEDICARE

## 2025-06-12 DIAGNOSIS — H35.3132: ICD-10-CM

## 2025-06-12 PROCEDURE — 92012 INTRM OPH EXAM EST PATIENT: CPT | Performed by: OPHTHALMOLOGY

## 2025-06-12 PROCEDURE — 92134 CPTRZ OPH DX IMG PST SGM RTA: CPT | Performed by: OPHTHALMOLOGY

## 2025-06-12 ASSESSMENT — REFRACTION_CURRENTRX
OS_OVR_VA: 20/
OD_OVR_VA: 20/
OD_ADD: +1.50
OS_OVR_VA: 20/
OD_SPHERE: +3.25
OS_CYLINDER: -0.50
OS_CYLINDER: 0.00
OD_AXIS: 117
OD_VPRISM_DIRECTION: BF
OD_CYLINDER: -0.25
OD_AXIS: 061
OS_SPHERE: +2.25
OD_AXIS: 118
OD_ADD: +2.75
OS_OVR_VA: 20/
OD_CYLINDER: -0.25
OS_VPRISM_DIRECTION: SV
OS_SPHERE: +3.75
OD_SPHERE: +2.75
OD_VPRISM_DIRECTION: SV
OS_VPRISM_DIRECTION: BF
OS_SPHERE: +1.50
OS_AXIS: 121
OD_CYLINDER: -1.25
OD_SPHERE: +2.25
OS_AXIS: 180
OS_ADD: +1.50
OD_OVR_VA: 20/
OS_CYLINDER: -1.50
OS_SPHERE: +2.75
OD_OVR_VA: 20/
OS_ADD: +3.00
OS_AXIS: 115
OD_SPHERE: +1.50

## 2025-06-12 ASSESSMENT — TONOMETRY
OD_IOP_MMHG: 18
OS_IOP_MMHG: 17

## 2025-06-12 ASSESSMENT — CONFRONTATIONAL VISUAL FIELD TEST (CVF)
OD_FINDINGS: FULL
OS_FINDINGS: FULL

## 2025-06-12 ASSESSMENT — REFRACTION_AUTOREFRACTION
OD_CYLINDER: -0.75
OS_SPHERE: +2.25
OS_AXIS: 110
OS_CYLINDER: -0.75
OD_AXIS: 098
OD_SPHERE: +1.25

## 2025-06-12 ASSESSMENT — LID EXAM ASSESSMENTS
OS_BLEPHARITIS: LUL 1+
OD_BLEPHARITIS: RUL 1+
OD_COMMENTS: TRACE SLEEVING
OS_COMMENTS: TRACE SLEEVING

## 2025-06-12 ASSESSMENT — VISUAL ACUITY
OS_BCVA: 20/25
OD_BCVA: 20/60

## 2025-07-15 ENCOUNTER — APPOINTMENT (OUTPATIENT)
Dept: UROGYNECOLOGY | Facility: CLINIC | Age: 80
End: 2025-07-15
Payer: MEDICARE

## 2025-07-15 VITALS
HEART RATE: 74 BPM | SYSTOLIC BLOOD PRESSURE: 140 MMHG | BODY MASS INDEX: 29.81 KG/M2 | DIASTOLIC BLOOD PRESSURE: 80 MMHG | HEIGHT: 62 IN | WEIGHT: 162 LBS

## 2025-07-15 PROCEDURE — 51798 US URINE CAPACITY MEASURE: CPT

## 2025-07-15 PROCEDURE — 99213 OFFICE O/P EST LOW 20 MIN: CPT

## 2025-08-07 ENCOUNTER — APPOINTMENT (OUTPATIENT)
Dept: UROGYNECOLOGY | Facility: CLINIC | Age: 80
End: 2025-08-07

## 2025-08-07 ENCOUNTER — RX RENEWAL (OUTPATIENT)
Age: 80
End: 2025-08-07

## 2025-08-18 ENCOUNTER — NON-APPOINTMENT (OUTPATIENT)
Age: 80
End: 2025-08-18

## 2025-08-20 ENCOUNTER — APPOINTMENT (OUTPATIENT)
Dept: UROGYNECOLOGY | Facility: CLINIC | Age: 80
End: 2025-08-20

## 2025-08-21 ENCOUNTER — OFFICE (OUTPATIENT)
Dept: URBAN - METROPOLITAN AREA CLINIC 115 | Facility: CLINIC | Age: 80
Setting detail: OPHTHALMOLOGY
End: 2025-08-21
Payer: MEDICARE

## 2025-08-21 DIAGNOSIS — H35.3132: ICD-10-CM

## 2025-08-21 PROCEDURE — 92012 INTRM OPH EXAM EST PATIENT: CPT | Performed by: OPHTHALMOLOGY

## 2025-08-21 PROCEDURE — 92134 CPTRZ OPH DX IMG PST SGM RTA: CPT | Performed by: OPHTHALMOLOGY

## 2025-08-21 ASSESSMENT — REFRACTION_CURRENTRX
OS_ADD: +3.00
OD_OVR_VA: 20/
OD_CYLINDER: -0.25
OS_OVR_VA: 20/
OD_OVR_VA: 20/
OD_AXIS: 118
OS_CYLINDER: 0.00
OD_SPHERE: +2.75
OS_CYLINDER: -1.50
OD_VPRISM_DIRECTION: SV
OD_ADD: +2.75
OS_CYLINDER: -0.50
OD_SPHERE: +2.25
OD_AXIS: 061
OS_VPRISM_DIRECTION: SV
OD_OVR_VA: 20/
OS_SPHERE: +3.75
OD_CYLINDER: -1.25
OD_ADD: +1.50
OD_SPHERE: +3.25
OD_AXIS: 117
OD_CYLINDER: -0.25
OS_OVR_VA: 20/
OS_AXIS: 180
OS_VPRISM_DIRECTION: BF
OS_ADD: +1.50
OD_SPHERE: +1.50
OS_OVR_VA: 20/
OS_SPHERE: +2.75
OS_AXIS: 115
OS_AXIS: 121
OS_SPHERE: +2.25
OS_SPHERE: +1.50
OD_VPRISM_DIRECTION: BF

## 2025-08-21 ASSESSMENT — TONOMETRY
OS_IOP_MMHG: 15
OD_IOP_MMHG: 17

## 2025-08-21 ASSESSMENT — CONFRONTATIONAL VISUAL FIELD TEST (CVF)
OS_FINDINGS: FULL
OD_FINDINGS: FULL

## 2025-08-21 ASSESSMENT — VISUAL ACUITY
OD_BCVA: 20/60-1
OS_BCVA: 20/25

## 2025-08-21 ASSESSMENT — LID EXAM ASSESSMENTS
OS_COMMENTS: TRACE SLEEVING
OD_COMMENTS: TRACE SLEEVING
OD_BLEPHARITIS: RUL 1+
OS_BLEPHARITIS: LUL 1+

## 2025-08-21 ASSESSMENT — REFRACTION_AUTOREFRACTION
OD_CYLINDER: -0.75
OD_AXIS: 098
OS_CYLINDER: -0.75
OS_AXIS: 110
OD_SPHERE: +1.25
OS_SPHERE: +2.25

## 2025-08-25 ENCOUNTER — NON-APPOINTMENT (OUTPATIENT)
Age: 80
End: 2025-08-25

## 2025-08-28 ENCOUNTER — APPOINTMENT (OUTPATIENT)
Dept: UROGYNECOLOGY | Facility: CLINIC | Age: 80
End: 2025-08-28
Payer: MEDICARE

## 2025-08-28 VITALS — HEART RATE: 76 BPM | DIASTOLIC BLOOD PRESSURE: 82 MMHG | SYSTOLIC BLOOD PRESSURE: 137 MMHG

## 2025-08-28 PROCEDURE — 51797 INTRAABDOMINAL PRESSURE TEST: CPT

## 2025-08-28 PROCEDURE — 51741 ELECTRO-UROFLOWMETRY FIRST: CPT

## 2025-08-28 PROCEDURE — 51729 CYSTOMETROGRAM W/VP&UP: CPT

## 2025-08-28 PROCEDURE — 51784 ANAL/URINARY MUSCLE STUDY: CPT

## 2025-09-11 ENCOUNTER — APPOINTMENT (OUTPATIENT)
Dept: INTERNAL MEDICINE | Facility: CLINIC | Age: 80
End: 2025-09-11

## 2025-09-18 ENCOUNTER — APPOINTMENT (OUTPATIENT)
Dept: UROGYNECOLOGY | Facility: CLINIC | Age: 80
End: 2025-09-18

## (undated) DEVICE — DRAPE C ARM UNIVERSAL

## (undated) DEVICE — Device

## (undated) DEVICE — NDL HYPO REGULAR BEVEL 25G X 1.5" (BLUE)

## (undated) DEVICE — PACK EXTREMITY

## (undated) DEVICE — GLV 8 PROTEXIS (WHITE)

## (undated) DEVICE — FRAZIER SUCTION TIP 10FR

## (undated) DEVICE — WARMING BLANKET LOWER ADULT

## (undated) DEVICE — DRILL BIT ARTHREX 1.7MM

## (undated) DEVICE — DRAPE TOWEL BLUE 17" X 24"

## (undated) DEVICE — DRILL BIT ARTHREX 2.5MM

## (undated) DEVICE — SUT VICRYL 3-0 27" CT-2 UNDYED

## (undated) DEVICE — SUT VICRYL PLUS 4-0 18" PS-2 UNDYED

## (undated) DEVICE — DRAPE HAND 77" X 146"

## (undated) DEVICE — SOL IRR POUR NS 0.9% 1000ML

## (undated) DEVICE — SOL IRR POUR H2O 1000ML

## (undated) DEVICE — SUT MONOCRYL 4-0 27" PS-2 UNDYED

## (undated) DEVICE — VENODYNE/SCD SLEEVE CALF MEDIUM